# Patient Record
Sex: MALE | Race: WHITE | Employment: FULL TIME | URBAN - METROPOLITAN AREA
[De-identification: names, ages, dates, MRNs, and addresses within clinical notes are randomized per-mention and may not be internally consistent; named-entity substitution may affect disease eponyms.]

---

## 2017-01-10 ENCOUNTER — OFFICE VISIT (OUTPATIENT)
Dept: INTERNAL MEDICINE CLINIC | Age: 37
End: 2017-01-10

## 2017-01-10 VITALS
WEIGHT: 299 LBS | SYSTOLIC BLOOD PRESSURE: 144 MMHG | BODY MASS INDEX: 42.8 KG/M2 | HEIGHT: 70 IN | DIASTOLIC BLOOD PRESSURE: 98 MMHG

## 2017-01-10 DIAGNOSIS — E66.01 MORBID OBESITY WITH BMI OF 40.0-44.9, ADULT (HCC): Primary | ICD-10-CM

## 2017-01-10 DIAGNOSIS — I10 ESSENTIAL HYPERTENSION: ICD-10-CM

## 2017-01-10 PROCEDURE — 99213 OFFICE O/P EST LOW 20 MIN: CPT | Performed by: NURSE PRACTITIONER

## 2017-01-10 ASSESSMENT — ENCOUNTER SYMPTOMS
EYES NEGATIVE: 1
COLOR CHANGE: 0
COUGH: 0
SHORTNESS OF BREATH: 0
BLOOD IN STOOL: 0
DIARRHEA: 0
BACK PAIN: 0
CONSTIPATION: 0

## 2017-02-07 ENCOUNTER — OFFICE VISIT (OUTPATIENT)
Dept: ORTHOPEDIC SURGERY | Age: 37
End: 2017-02-07

## 2017-02-07 VITALS — HEIGHT: 70 IN | WEIGHT: 299 LBS | BODY MASS INDEX: 42.8 KG/M2

## 2017-02-07 DIAGNOSIS — M75.82 TENDINITIS OF LEFT ROTATOR CUFF: Primary | ICD-10-CM

## 2017-02-07 PROCEDURE — 99213 OFFICE O/P EST LOW 20 MIN: CPT | Performed by: ORTHOPAEDIC SURGERY

## 2017-02-15 ENCOUNTER — PATIENT MESSAGE (OUTPATIENT)
Dept: INTERNAL MEDICINE CLINIC | Age: 37
End: 2017-02-15

## 2017-02-15 DIAGNOSIS — H92.09 EAR DISCOMFORT, UNSPECIFIED LATERALITY: Primary | ICD-10-CM

## 2017-02-15 RX ORDER — AZITHROMYCIN 250 MG/1
TABLET, FILM COATED ORAL
Qty: 1 PACKET | Refills: 0 | Status: SHIPPED | OUTPATIENT
Start: 2017-02-15 | End: 2017-02-25

## 2017-02-21 DIAGNOSIS — M25.512 LEFT SHOULDER PAIN, UNSPECIFIED CHRONICITY: Primary | ICD-10-CM

## 2017-02-21 PROCEDURE — L3670 SO ACRO/CLAV CAN WEB PRE OTS: HCPCS | Performed by: ORTHOPAEDIC SURGERY

## 2017-03-15 ENCOUNTER — TELEPHONE (OUTPATIENT)
Dept: ORTHOPEDIC SURGERY | Age: 37
End: 2017-03-15

## 2017-03-15 ENCOUNTER — OFFICE VISIT (OUTPATIENT)
Dept: INTERNAL MEDICINE CLINIC | Age: 37
End: 2017-03-15

## 2017-03-15 VITALS
BODY MASS INDEX: 42.66 KG/M2 | SYSTOLIC BLOOD PRESSURE: 134 MMHG | WEIGHT: 298 LBS | OXYGEN SATURATION: 98 % | TEMPERATURE: 98.2 F | HEIGHT: 70 IN | HEART RATE: 80 BPM | DIASTOLIC BLOOD PRESSURE: 88 MMHG

## 2017-03-15 DIAGNOSIS — K21.9 GASTROESOPHAGEAL REFLUX DISEASE WITHOUT ESOPHAGITIS: ICD-10-CM

## 2017-03-15 DIAGNOSIS — M25.512 ACUTE PAIN OF LEFT SHOULDER: Primary | ICD-10-CM

## 2017-03-15 DIAGNOSIS — K22.2 SCHATZKI'S RING: ICD-10-CM

## 2017-03-15 DIAGNOSIS — I10 ESSENTIAL HYPERTENSION: ICD-10-CM

## 2017-03-15 DIAGNOSIS — E66.01 MORBID OBESITY WITH BMI OF 40.0-44.9, ADULT (HCC): ICD-10-CM

## 2017-03-15 DIAGNOSIS — Z01.818 PRE-OP EXAM: ICD-10-CM

## 2017-03-15 DIAGNOSIS — E78.5 HYPERLIPIDEMIA, UNSPECIFIED HYPERLIPIDEMIA TYPE: ICD-10-CM

## 2017-03-15 PROCEDURE — 99214 OFFICE O/P EST MOD 30 MIN: CPT | Performed by: NURSE PRACTITIONER

## 2017-03-15 PROCEDURE — 93000 ELECTROCARDIOGRAM COMPLETE: CPT | Performed by: NURSE PRACTITIONER

## 2017-03-15 RX ORDER — LOSARTAN POTASSIUM 25 MG/1
25 TABLET ORAL DAILY
Qty: 30 TABLET | Refills: 3 | Status: SHIPPED | OUTPATIENT
Start: 2017-03-15 | End: 2018-01-23 | Stop reason: SDUPTHER

## 2017-03-22 ENCOUNTER — TELEPHONE (OUTPATIENT)
Dept: ORTHOPEDIC SURGERY | Age: 37
End: 2017-03-22

## 2017-03-27 ENCOUNTER — HOSPITAL ENCOUNTER (OUTPATIENT)
Dept: OTHER | Age: 37
Discharge: HOME | End: 2017-03-27
Attending: NURSE PRACTITIONER | Admitting: NURSE PRACTITIONER

## 2017-03-27 LAB
A/G RATIO: 1.7 (ref 1.1–2.2)
ALBUMIN SERPL-MCNC: 4.5 G/DL (ref 3.4–5)
ALP BLD-CCNC: 88 U/L (ref 40–129)
ALT SERPL-CCNC: 41 U/L (ref 10–40)
ANION GAP SERPL CALCULATED.3IONS-SCNC: 11 MMOL/L (ref 3–16)
AST SERPL-CCNC: 26 U/L (ref 15–37)
BASOPHILS ABSOLUTE: 0 K/UL (ref 0–0.2)
BASOPHILS RELATIVE PERCENT: 0.8 %
BILIRUB SERPL-MCNC: 0.5 MG/DL (ref 0–1)
BUN BLDV-MCNC: 14 MG/DL (ref 7–20)
CALCIUM SERPL-MCNC: 8.4 MG/DL (ref 8.3–10.6)
CHLORIDE BLD-SCNC: 102 MMOL/L (ref 99–110)
CO2: 27 MMOL/L (ref 21–32)
CREAT SERPL-MCNC: 1 MG/DL (ref 0.9–1.3)
EOSINOPHILS ABSOLUTE: 0.3 K/UL (ref 0–0.6)
EOSINOPHILS RELATIVE PERCENT: 4.2 %
GFR AFRICAN AMERICAN: >60
GFR NON-AFRICAN AMERICAN: >60
GLOBULIN: 2.7 G/DL
GLUCOSE BLD-MCNC: 100 MG/DL (ref 70–99)
HCT VFR BLD CALC: 44.6 % (ref 40.5–52.5)
HEMOGLOBIN: 14.5 G/DL (ref 13.5–17.5)
LYMPHOCYTES ABSOLUTE: 1.8 K/UL (ref 1–5.1)
LYMPHOCYTES RELATIVE PERCENT: 30.2 %
MCH RBC QN AUTO: 26.5 PG (ref 26–34)
MCHC RBC AUTO-ENTMCNC: 32.5 G/DL (ref 31–36)
MCV RBC AUTO: 81.6 FL (ref 80–100)
MONOCYTES ABSOLUTE: 0.5 K/UL (ref 0–1.3)
MONOCYTES RELATIVE PERCENT: 8.2 %
NEUTROPHILS ABSOLUTE: 3.4 K/UL (ref 1.7–7.7)
NEUTROPHILS RELATIVE PERCENT: 56.6 %
PDW BLD-RTO: 15.1 % (ref 12.4–15.4)
PLATELET # BLD: 175 K/UL (ref 135–450)
PMV BLD AUTO: 10.1 FL (ref 5–10.5)
POTASSIUM SERPL-SCNC: 4 MMOL/L (ref 3.5–5.1)
RBC # BLD: 5.46 M/UL (ref 4.2–5.9)
SODIUM BLD-SCNC: 140 MMOL/L (ref 136–145)
TOTAL PROTEIN: 7.2 G/DL (ref 6.4–8.2)
WBC # BLD: 6 K/UL (ref 4–11)

## 2017-03-28 RX ORDER — ONDANSETRON 2 MG/ML
4 INJECTION INTRAMUSCULAR; INTRAVENOUS
Status: ACTIVE | OUTPATIENT
Start: 2017-03-28 | End: 2017-03-28

## 2017-03-28 RX ORDER — DIPHENHYDRAMINE HYDROCHLORIDE 50 MG/ML
12.5 INJECTION INTRAMUSCULAR; INTRAVENOUS
Status: ACTIVE | OUTPATIENT
Start: 2017-03-28 | End: 2017-03-28

## 2017-03-28 RX ORDER — PROMETHAZINE HYDROCHLORIDE 25 MG/ML
6.25 INJECTION, SOLUTION INTRAMUSCULAR; INTRAVENOUS
Status: ACTIVE | OUTPATIENT
Start: 2017-03-28 | End: 2017-03-28

## 2017-03-28 RX ORDER — LABETALOL HYDROCHLORIDE 5 MG/ML
5 INJECTION, SOLUTION INTRAVENOUS EVERY 10 MIN PRN
Status: DISCONTINUED | OUTPATIENT
Start: 2017-03-28 | End: 2017-03-30 | Stop reason: HOSPADM

## 2017-03-28 RX ORDER — MORPHINE SULFATE 2 MG/ML
2 INJECTION, SOLUTION INTRAMUSCULAR; INTRAVENOUS EVERY 5 MIN PRN
Status: DISCONTINUED | OUTPATIENT
Start: 2017-03-28 | End: 2017-03-30 | Stop reason: HOSPADM

## 2017-03-28 RX ORDER — MEPERIDINE HYDROCHLORIDE 25 MG/ML
12.5 INJECTION INTRAMUSCULAR; INTRAVENOUS; SUBCUTANEOUS EVERY 5 MIN PRN
Status: DISCONTINUED | OUTPATIENT
Start: 2017-03-28 | End: 2017-03-30 | Stop reason: HOSPADM

## 2017-03-28 RX ORDER — HYDRALAZINE HYDROCHLORIDE 20 MG/ML
5 INJECTION INTRAMUSCULAR; INTRAVENOUS EVERY 10 MIN PRN
Status: DISCONTINUED | OUTPATIENT
Start: 2017-03-28 | End: 2017-03-30 | Stop reason: HOSPADM

## 2017-03-28 RX ORDER — OXYCODONE HYDROCHLORIDE AND ACETAMINOPHEN 5; 325 MG/1; MG/1
1 TABLET ORAL PRN
Status: ACTIVE | OUTPATIENT
Start: 2017-03-28 | End: 2017-03-28

## 2017-03-28 RX ORDER — OXYCODONE HYDROCHLORIDE AND ACETAMINOPHEN 5; 325 MG/1; MG/1
2 TABLET ORAL PRN
Status: ACTIVE | OUTPATIENT
Start: 2017-03-28 | End: 2017-03-28

## 2017-03-28 RX ORDER — MORPHINE SULFATE 2 MG/ML
1 INJECTION, SOLUTION INTRAMUSCULAR; INTRAVENOUS EVERY 5 MIN PRN
Status: DISCONTINUED | OUTPATIENT
Start: 2017-03-28 | End: 2017-03-30 | Stop reason: HOSPADM

## 2017-03-29 ENCOUNTER — HOSPITAL ENCOUNTER (OUTPATIENT)
Dept: SURGERY | Age: 37
Discharge: HOME | End: 2017-03-29
Attending: ORTHOPAEDIC SURGERY | Admitting: ORTHOPAEDIC SURGERY

## 2017-03-29 VITALS
OXYGEN SATURATION: 100 % | SYSTOLIC BLOOD PRESSURE: 127 MMHG | HEART RATE: 87 BPM | DIASTOLIC BLOOD PRESSURE: 80 MMHG | BODY MASS INDEX: 42.66 KG/M2 | TEMPERATURE: 97 F | WEIGHT: 298 LBS | HEIGHT: 70 IN | RESPIRATION RATE: 16 BRPM

## 2017-03-29 RX ORDER — OXYCODONE HYDROCHLORIDE AND ACETAMINOPHEN 5; 325 MG/1; MG/1
1 TABLET ORAL EVERY 4 HOURS PRN
Qty: 60 TABLET | Refills: 0 | Status: SHIPPED | OUTPATIENT
Start: 2017-03-29 | End: 2017-04-05

## 2017-03-29 RX ORDER — ACETAMINOPHEN 10 MG/ML
1000 INJECTION, SOLUTION INTRAVENOUS ONCE
Status: COMPLETED | OUTPATIENT
Start: 2017-03-29 | End: 2017-03-29

## 2017-03-29 RX ORDER — OXYCODONE HYDROCHLORIDE AND ACETAMINOPHEN 5; 325 MG/1; MG/1
1 TABLET ORAL EVERY 4 HOURS PRN
Status: DISCONTINUED | OUTPATIENT
Start: 2017-03-29 | End: 2017-03-30 | Stop reason: HOSPADM

## 2017-03-29 RX ORDER — OXYCODONE HYDROCHLORIDE AND ACETAMINOPHEN 5; 325 MG/1; MG/1
TABLET ORAL
Status: DISPENSED
Start: 2017-03-29 | End: 2017-03-29

## 2017-03-29 RX ORDER — FENTANYL CITRATE 50 UG/ML
INJECTION, SOLUTION INTRAMUSCULAR; INTRAVENOUS
Status: DISPENSED
Start: 2017-03-29 | End: 2017-03-29

## 2017-03-29 RX ORDER — MIDAZOLAM HYDROCHLORIDE 1 MG/ML
INJECTION INTRAMUSCULAR; INTRAVENOUS
Status: DISPENSED
Start: 2017-03-29 | End: 2017-03-29

## 2017-03-29 RX ORDER — LIDOCAINE HYDROCHLORIDE 10 MG/ML
1 INJECTION, SOLUTION EPIDURAL; INFILTRATION; INTRACAUDAL; PERINEURAL
Status: ACTIVE | OUTPATIENT
Start: 2017-03-29 | End: 2017-03-29

## 2017-03-29 RX ORDER — ONDANSETRON 4 MG/1
4 TABLET, ORALLY DISINTEGRATING ORAL EVERY 8 HOURS PRN
Qty: 40 TABLET | Refills: 0 | Status: SHIPPED | OUTPATIENT
Start: 2017-03-29 | End: 2018-02-14 | Stop reason: ALTCHOICE

## 2017-03-29 RX ORDER — SODIUM CHLORIDE, SODIUM LACTATE, POTASSIUM CHLORIDE, CALCIUM CHLORIDE 600; 310; 30; 20 MG/100ML; MG/100ML; MG/100ML; MG/100ML
INJECTION, SOLUTION INTRAVENOUS CONTINUOUS
Status: DISCONTINUED | OUTPATIENT
Start: 2017-03-29 | End: 2017-03-30 | Stop reason: HOSPADM

## 2017-03-29 RX ADMIN — SODIUM CHLORIDE, SODIUM LACTATE, POTASSIUM CHLORIDE, CALCIUM CHLORIDE: 600; 310; 30; 20 INJECTION, SOLUTION INTRAVENOUS at 07:22

## 2017-03-29 RX ADMIN — OXYCODONE HYDROCHLORIDE AND ACETAMINOPHEN 1 TABLET: 5; 325 TABLET ORAL at 10:58

## 2017-03-29 RX ADMIN — ACETAMINOPHEN 1000 MG: 10 INJECTION, SOLUTION INTRAVENOUS at 07:38

## 2017-03-29 ASSESSMENT — PAIN SCALES - GENERAL: PAINLEVEL_OUTOF10: 7

## 2017-03-29 ASSESSMENT — PAIN - FUNCTIONAL ASSESSMENT: PAIN_FUNCTIONAL_ASSESSMENT: 0-10

## 2017-03-30 DIAGNOSIS — M75.82 TENDINITIS OF LEFT ROTATOR CUFF: Primary | ICD-10-CM

## 2017-04-04 ENCOUNTER — OFFICE VISIT (OUTPATIENT)
Dept: ORTHOPEDIC SURGERY | Age: 37
End: 2017-04-04

## 2017-04-04 VITALS — HEIGHT: 70 IN | BODY MASS INDEX: 42.67 KG/M2 | WEIGHT: 298.06 LBS

## 2017-04-04 DIAGNOSIS — Z98.890 S/P ROTATOR CUFF REPAIR: ICD-10-CM

## 2017-04-04 DIAGNOSIS — Z98.890 S/P ARTHROSCOPY OF SHOULDER: ICD-10-CM

## 2017-04-04 PROCEDURE — 99024 POSTOP FOLLOW-UP VISIT: CPT | Performed by: PHYSICIAN ASSISTANT

## 2017-04-05 ENCOUNTER — OFFICE VISIT (OUTPATIENT)
Dept: ORTHOPEDIC SURGERY | Age: 37
End: 2017-04-05

## 2017-04-05 ENCOUNTER — HOSPITAL ENCOUNTER (OUTPATIENT)
Dept: PHYSICAL THERAPY | Age: 37
Discharge: HOME | End: 2017-04-05
Admitting: ORTHOPAEDIC SURGERY

## 2017-04-05 ENCOUNTER — HOSPITAL ENCOUNTER (OUTPATIENT)
Dept: PHYSICAL THERAPY | Age: 37
Discharge: HOME | End: 2017-04-30
Admitting: ORTHOPAEDIC SURGERY

## 2017-04-05 VITALS — HEIGHT: 70 IN | BODY MASS INDEX: 42.67 KG/M2 | WEIGHT: 298.06 LBS

## 2017-04-05 DIAGNOSIS — M76.31 ILIOTIBIAL BAND SYNDROME, RIGHT: ICD-10-CM

## 2017-04-05 DIAGNOSIS — M25.561 RIGHT KNEE PAIN, UNSPECIFIED CHRONICITY: Primary | ICD-10-CM

## 2017-04-05 DIAGNOSIS — M79.671 PAIN OF RIGHT HEEL: ICD-10-CM

## 2017-04-05 PROCEDURE — 99214 OFFICE O/P EST MOD 30 MIN: CPT | Performed by: ORTHOPAEDIC SURGERY

## 2017-04-05 PROCEDURE — 73560 X-RAY EXAM OF KNEE 1 OR 2: CPT | Performed by: ORTHOPAEDIC SURGERY

## 2017-04-05 PROCEDURE — 73650 X-RAY EXAM OF HEEL: CPT | Performed by: ORTHOPAEDIC SURGERY

## 2017-04-12 ENCOUNTER — HOSPITAL ENCOUNTER (OUTPATIENT)
Dept: PHYSICAL THERAPY | Age: 37
Discharge: HOME | End: 2017-04-12
Admitting: ORTHOPAEDIC SURGERY

## 2017-04-19 ENCOUNTER — HOSPITAL ENCOUNTER (OUTPATIENT)
Dept: PHYSICAL THERAPY | Age: 37
Discharge: HOME | End: 2017-04-19
Admitting: ORTHOPAEDIC SURGERY

## 2017-04-20 ENCOUNTER — HOSPITAL ENCOUNTER (OUTPATIENT)
Dept: PHYSICAL THERAPY | Age: 37
Discharge: HOME | End: 2017-04-20
Admitting: ORTHOPAEDIC SURGERY

## 2017-04-24 ENCOUNTER — HOSPITAL ENCOUNTER (OUTPATIENT)
Dept: PHYSICAL THERAPY | Age: 37
Discharge: HOME | End: 2017-04-24
Admitting: ORTHOPAEDIC SURGERY

## 2017-04-26 ENCOUNTER — HOSPITAL ENCOUNTER (OUTPATIENT)
Dept: PHYSICAL THERAPY | Age: 37
Discharge: HOME | End: 2017-04-26
Admitting: ORTHOPAEDIC SURGERY

## 2017-04-27 ENCOUNTER — HOSPITAL ENCOUNTER (OUTPATIENT)
Dept: PHYSICAL THERAPY | Age: 37
Discharge: HOME | End: 2017-04-27
Admitting: ORTHOPAEDIC SURGERY

## 2017-05-02 ENCOUNTER — OFFICE VISIT (OUTPATIENT)
Dept: ORTHOPEDIC SURGERY | Age: 37
End: 2017-05-02

## 2017-05-02 VITALS — BODY MASS INDEX: 42.67 KG/M2 | HEIGHT: 70 IN | WEIGHT: 298.06 LBS

## 2017-05-02 DIAGNOSIS — M76.821 TIBIALIS POSTICUS TENDINITIS, RIGHT: ICD-10-CM

## 2017-05-02 DIAGNOSIS — M79.671 FOOT PAIN, RIGHT: Primary | ICD-10-CM

## 2017-05-02 PROCEDURE — 73630 X-RAY EXAM OF FOOT: CPT | Performed by: PHYSICIAN ASSISTANT

## 2017-05-02 PROCEDURE — L1902 AFO ANKLE GAUNTLET PRE OTS: HCPCS | Performed by: PHYSICIAN ASSISTANT

## 2017-05-02 PROCEDURE — 99213 OFFICE O/P EST LOW 20 MIN: CPT | Performed by: PHYSICIAN ASSISTANT

## 2017-05-03 ENCOUNTER — HOSPITAL ENCOUNTER (OUTPATIENT)
Dept: PHYSICAL THERAPY | Age: 37
Discharge: HOME | End: 2017-05-03
Admitting: ORTHOPAEDIC SURGERY

## 2017-05-09 ENCOUNTER — OFFICE VISIT (OUTPATIENT)
Dept: ORTHOPEDIC SURGERY | Age: 37
End: 2017-05-09

## 2017-05-09 VITALS — WEIGHT: 298.06 LBS | HEIGHT: 70 IN | BODY MASS INDEX: 42.67 KG/M2

## 2017-05-09 DIAGNOSIS — Z98.890 S/P ARTHROSCOPY OF SHOULDER: Primary | ICD-10-CM

## 2017-05-09 PROCEDURE — 99024 POSTOP FOLLOW-UP VISIT: CPT | Performed by: ORTHOPAEDIC SURGERY

## 2017-06-22 ENCOUNTER — OFFICE VISIT (OUTPATIENT)
Dept: INTERNAL MEDICINE CLINIC | Age: 37
End: 2017-06-22

## 2017-06-22 VITALS
HEIGHT: 70 IN | WEIGHT: 292 LBS | SYSTOLIC BLOOD PRESSURE: 132 MMHG | DIASTOLIC BLOOD PRESSURE: 80 MMHG | BODY MASS INDEX: 41.8 KG/M2

## 2017-06-22 DIAGNOSIS — M25.571 ACUTE RIGHT ANKLE PAIN: ICD-10-CM

## 2017-06-22 DIAGNOSIS — I10 ESSENTIAL HYPERTENSION: Primary | ICD-10-CM

## 2017-06-22 DIAGNOSIS — E66.01 MORBID OBESITY DUE TO EXCESS CALORIES (HCC): ICD-10-CM

## 2017-06-22 PROCEDURE — 99213 OFFICE O/P EST LOW 20 MIN: CPT | Performed by: INTERNAL MEDICINE

## 2017-06-22 ASSESSMENT — ENCOUNTER SYMPTOMS
ABDOMINAL DISTENTION: 0
VOMITING: 0
SHORTNESS OF BREATH: 0
CONSTIPATION: 0
BACK PAIN: 0
DIARRHEA: 0
COUGH: 0
WHEEZING: 0
NAUSEA: 0
CHEST TIGHTNESS: 0

## 2017-06-22 ASSESSMENT — PATIENT HEALTH QUESTIONNAIRE - PHQ9
SUM OF ALL RESPONSES TO PHQ QUESTIONS 1-9: 0
2. FEELING DOWN, DEPRESSED OR HOPELESS: 0
SUM OF ALL RESPONSES TO PHQ9 QUESTIONS 1 & 2: 0
1. LITTLE INTEREST OR PLEASURE IN DOING THINGS: 0

## 2017-06-23 ENCOUNTER — OFFICE VISIT (OUTPATIENT)
Dept: ORTHOPEDIC SURGERY | Age: 37
End: 2017-06-23

## 2017-06-23 VITALS
SYSTOLIC BLOOD PRESSURE: 136 MMHG | DIASTOLIC BLOOD PRESSURE: 94 MMHG | BODY MASS INDEX: 41.82 KG/M2 | HEIGHT: 70 IN | HEART RATE: 82 BPM | WEIGHT: 292.11 LBS

## 2017-06-23 DIAGNOSIS — M79.671 RIGHT FOOT PAIN: Primary | ICD-10-CM

## 2017-06-23 PROCEDURE — 99243 OFF/OP CNSLTJ NEW/EST LOW 30: CPT | Performed by: ORTHOPAEDIC SURGERY

## 2017-06-27 ENCOUNTER — OFFICE VISIT (OUTPATIENT)
Dept: ORTHOPEDIC SURGERY | Age: 37
End: 2017-06-27

## 2017-06-27 VITALS — BODY MASS INDEX: 41.82 KG/M2 | HEIGHT: 70 IN | WEIGHT: 292.11 LBS

## 2017-06-27 DIAGNOSIS — Z98.890 S/P ARTHROSCOPY OF SHOULDER: Primary | ICD-10-CM

## 2017-06-27 PROCEDURE — 99024 POSTOP FOLLOW-UP VISIT: CPT | Performed by: ORTHOPAEDIC SURGERY

## 2017-07-06 DIAGNOSIS — M79.671 RIGHT FOOT PAIN: Primary | ICD-10-CM

## 2017-07-20 ENCOUNTER — TELEPHONE (OUTPATIENT)
Dept: ORTHOPEDIC SURGERY | Age: 37
End: 2017-07-20

## 2017-07-21 ENCOUNTER — ORTHOTIC/BRACE ENCOUNTER (OUTPATIENT)
Dept: ORTHOPEDIC SURGERY | Age: 37
End: 2017-07-21

## 2017-07-24 ENCOUNTER — HOSPITAL ENCOUNTER (OUTPATIENT)
Dept: PHYSICAL THERAPY | Age: 37
Discharge: HOME | End: 2017-07-31
Admitting: ORTHOPAEDIC SURGERY

## 2017-07-28 ENCOUNTER — ORTHOTIC/BRACE ENCOUNTER (OUTPATIENT)
Dept: ORTHOPEDIC SURGERY | Age: 37
End: 2017-07-28

## 2017-07-28 DIAGNOSIS — M72.2 PLANTAR FASCIITIS: Primary | ICD-10-CM

## 2017-07-28 PROCEDURE — L3020 FOOT LONGITUD/METATARSAL SUP: HCPCS | Performed by: PEDORTHIST

## 2017-08-07 ENCOUNTER — HOSPITAL ENCOUNTER (OUTPATIENT)
Dept: PHYSICAL THERAPY | Age: 37
Discharge: HOME | End: 2017-08-07
Admitting: ORTHOPAEDIC SURGERY

## 2017-08-14 ENCOUNTER — HOSPITAL ENCOUNTER (OUTPATIENT)
Dept: PHYSICAL THERAPY | Age: 37
Discharge: HOME | End: 2017-08-14
Admitting: ORTHOPAEDIC SURGERY

## 2017-08-23 ENCOUNTER — HOSPITAL ENCOUNTER (OUTPATIENT)
Dept: PHYSICAL THERAPY | Age: 37
Discharge: HOME | End: 2017-08-23
Admitting: ORTHOPAEDIC SURGERY

## 2017-08-29 ENCOUNTER — HOSPITAL ENCOUNTER (OUTPATIENT)
Dept: PHYSICAL THERAPY | Age: 37
Discharge: HOME | End: 2017-08-29
Admitting: ORTHOPAEDIC SURGERY

## 2017-09-26 ENCOUNTER — OFFICE VISIT (OUTPATIENT)
Dept: ORTHOPEDIC SURGERY | Age: 37
End: 2017-09-26

## 2017-09-26 VITALS
DIASTOLIC BLOOD PRESSURE: 67 MMHG | SYSTOLIC BLOOD PRESSURE: 90 MMHG | HEIGHT: 70 IN | HEART RATE: 76 BPM | WEIGHT: 292.11 LBS | BODY MASS INDEX: 41.82 KG/M2

## 2017-09-26 DIAGNOSIS — M76.821 POSTERIOR TIBIAL TENDINITIS OF RIGHT LEG: ICD-10-CM

## 2017-09-26 DIAGNOSIS — M79.671 RIGHT FOOT PAIN: Primary | ICD-10-CM

## 2017-09-26 PROCEDURE — 99212 OFFICE O/P EST SF 10 MIN: CPT | Performed by: ORTHOPAEDIC SURGERY

## 2017-09-26 RX ORDER — MELOXICAM 15 MG/1
15 TABLET ORAL DAILY
Qty: 30 TABLET | Refills: 3 | Status: SHIPPED | OUTPATIENT
Start: 2017-09-26 | End: 2018-11-01 | Stop reason: ALTCHOICE

## 2017-11-10 ENCOUNTER — TELEPHONE (OUTPATIENT)
Dept: INTERNAL MEDICINE CLINIC | Age: 37
End: 2017-11-10

## 2017-11-10 NOTE — TELEPHONE ENCOUNTER
Patient need an order for a TB test for work. 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         Chelsie Sanches: [phone]

## 2017-11-15 ENCOUNTER — NURSE ONLY (OUTPATIENT)
Dept: INTERNAL MEDICINE CLINIC | Age: 37
End: 2017-11-15

## 2017-11-15 DIAGNOSIS — Z11.1 PPD SCREENING TEST: Primary | ICD-10-CM

## 2017-11-15 PROCEDURE — 86580 TB INTRADERMAL TEST: CPT | Performed by: INTERNAL MEDICINE

## 2017-11-17 ENCOUNTER — NURSE ONLY (OUTPATIENT)
Dept: INTERNAL MEDICINE CLINIC | Age: 37
End: 2017-11-17

## 2017-12-14 ENCOUNTER — OFFICE VISIT (OUTPATIENT)
Dept: ORTHOPEDIC SURGERY | Age: 37
End: 2017-12-14

## 2017-12-14 VITALS
DIASTOLIC BLOOD PRESSURE: 99 MMHG | SYSTOLIC BLOOD PRESSURE: 162 MMHG | BODY MASS INDEX: 42.29 KG/M2 | WEIGHT: 295.4 LBS | HEART RATE: 82 BPM | HEIGHT: 70 IN

## 2017-12-14 DIAGNOSIS — M25.562 LEFT KNEE PAIN, UNSPECIFIED CHRONICITY: Primary | ICD-10-CM

## 2017-12-14 PROCEDURE — 99213 OFFICE O/P EST LOW 20 MIN: CPT | Performed by: ORTHOPAEDIC SURGERY

## 2017-12-14 NOTE — PROGRESS NOTES
CHIEF COMPLAINT:    Chief Complaint   Patient presents with    Knee Pain     LEFT KNEE PAIN- 1 MONTH, PAIN W/ KNEELING/BENDING/STAIRS. NO CAR        HISTORY OF PRESENT ILLNESS:                The patient is a 44 y.o. male   Past Medical History:   Diagnosis Date    GERD (gastroesophageal reflux disease)     Hypertension       This patient presents with ongoing pain along the lateral aspect of his LEFT patellar region. He didn't recall any specific mechanism injury. He has difficulty with squatting and kneeling Intensive activities. He notes some clicking occasionally. 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     The pain assessment was noted & is as follows:  Pain Assessment  Location of Pain: Knee  Location Modifiers: Left  Severity of Pain: 1 (7/10 AT WORST)  Quality of Pain: Aching, Sharp, Grinding, Popping  Duration of Pain: Persistent  Frequency of Pain: Constant  Aggravating Factors: Kneeling, Stairs, Walking, Bending  Limiting Behavior: Yes  Relieving Factors: Rest]      Work Status/Functionality:     Past Medical History: Medical history form was reviewed today & can be found in the media tab  Past Medical History:   Diagnosis Date    GERD (gastroesophageal reflux disease)     Hypertension       Past Surgical History:     Past Surgical History:   Procedure Laterality Date    CHOLECYSTECTOMY      EYE SURGERY Left 1/2014    [facility]    EYE SURGERY Right 3/2014    [facility]    UPPER GASTROINTESTINAL ENDOSCOPY  7/9/2012    WISDOM TOOTH EXTRACTION       Current Medications:     Current Outpatient Prescriptions:     meloxicam (MOBIC) 15 MG tablet, Take 1 tablet by mouth daily, Disp: 30 tablet, Rfl: 3    ondansetron (ZOFRAN ODT) 4 MG disintegrating tablet, Take 1 tablet by mouth every 8 hours as needed for Nausea or Vomiting, Disp: 40 tablet, Rfl: 0    losartan (COZAAR) 25 MG tablet, Take 1 tablet by mouth daily, Disp: 30 tablet, Rfl: 3    cetirizine (ZYRTEC) 10 MG tablet, Take 10 mg by mouth daily , Disp: , Rfl:     lansoprazole (PREVACID) 15 MG capsule, Take 15 mg by mouth daily , Disp: , Rfl:   Allergies:  Penicillins and Versed [midazolam]  Social History:    reports that he has never smoked. He has never used smokeless tobacco. He reports that he does not drink alcohol or use drugs. Family History:   Family History   Problem Relation Age of Onset    High Blood Pressure Mother     High Cholesterol Mother     High Blood Pressure Father     High Cholesterol Father     Diabetes Father     High Blood Pressure Brother     High Blood Pressure Maternal Grandmother     Diabetes Maternal Grandmother     High Blood Pressure Maternal Grandfather     Heart Disease Maternal Grandfather     High Blood Pressure Paternal Grandfather     Heart Disease Paternal Grandfather        REVIEW OF SYSTEMS:   For new problems, a full review of systems will be found scanned in the patient's chart. CONSTITUTIONAL: Denies unexplained weight loss, fevers, chills   NEUROLOGICAL: Denies unsteady gait or progressive weakness  SKIN: Denies skin changes, delayed healing, rash, itching       PHYSICAL EXAM:    Vitals: Blood pressure (!) 162/99, pulse 82, height 5' 9.69\" (1.77 m), weight 295 lb 6.4 oz (134 kg). GENERAL EXAM:  · General Apparence: Patient is adequately groomed with no evidence of malnutrition. · Orientation: The patient is oriented to time, place and person. · Mood & Affect:The patient's mood and affect are appropriate       LEFT knee PHYSICAL EXAMINATION:  · Inspection:  TRACKING OF THE PATELLA AND LATERAL TILT OF THE PATELLA. NO VISIBLE EFFUSION ECCHYMOSIS OR DEFORMITY OTHERWISE.    · Palpation:  Tender along the lateral parapatellar region with range of motion. · Range of Motion: 0135 degrees. · Strength: No focal motor weakness. Good VMO tone. · Special Tests:  Negative Verito testing medially and laterally. ACL MCL PCL LCL are all intact.   He doesn't 1+ subluxation of the patella and mild crepitus to range of motion of the patellofemoral joint laterally. · Skin:  There are no rashes, ulcerations or lesions. · Gait & station: Normal      · Additional Examinations:        Negative straight leg raise examination. Diagnostic Testing:      3 x-ray views of the LEFT knee demonstrates no significant bony pathology or abnormality. Orders     Orders Placed This Encounter   Procedures    XR KNEE LEFT (3 VIEWS)     [id number]     Order Specific Question:   Reason for exam:     Answer:   Pain         Assessment / Treatment Plan:     1. Lateral tracking of the patella. The patient's going to continue utilizes J brace and do stretches. He's been instructed to no exercise program.  He does have some evidence of possible lateral meniscus pathology. If he does continue to symptoms to need an MRI scan. 2.  Follow-up p.r.n.    3. I have personally performed and/or participated in the history, exam and medical decision making and agree with all pertinent clinical information. I have also reviewed and agree with the past medical, family and social history unless otherwise noted. This dictation was performed with a verbal recognition program (DRAGON) and it was checked for errors. It is possible that there are still dictated errors within this office note. If so, please bring any errors to my attention for an addendum. All efforts were made to ensure that this office note is accurate.           Dana Garrett MD

## 2018-01-23 ENCOUNTER — TELEPHONE (OUTPATIENT)
Dept: INTERNAL MEDICINE CLINIC | Age: 38
End: 2018-01-23

## 2018-01-23 DIAGNOSIS — I10 ESSENTIAL HYPERTENSION: ICD-10-CM

## 2018-01-23 RX ORDER — LOSARTAN POTASSIUM 25 MG/1
25 TABLET ORAL DAILY
Qty: 30 TABLET | Refills: 0 | Status: SHIPPED | OUTPATIENT
Start: 2018-01-23 | End: 2018-02-14 | Stop reason: SDUPTHER

## 2018-01-23 NOTE — TELEPHONE ENCOUNTER
Patient made an appointment for 2/14/18 with Dr. Jenn Wiggins but is in need of a refill of his Losartan 25 mg to be sent to [facility].

## 2018-02-14 ENCOUNTER — OFFICE VISIT (OUTPATIENT)
Dept: INTERNAL MEDICINE CLINIC | Age: 38
End: 2018-02-14

## 2018-02-14 VITALS
WEIGHT: 294 LBS | DIASTOLIC BLOOD PRESSURE: 78 MMHG | OXYGEN SATURATION: 96 % | HEART RATE: 121 BPM | SYSTOLIC BLOOD PRESSURE: 126 MMHG | BODY MASS INDEX: 42.57 KG/M2

## 2018-02-14 DIAGNOSIS — I10 ESSENTIAL HYPERTENSION: ICD-10-CM

## 2018-02-14 DIAGNOSIS — E78.2 MIXED HYPERLIPIDEMIA: ICD-10-CM

## 2018-02-14 DIAGNOSIS — M25.571 CHRONIC PAIN OF RIGHT ANKLE: ICD-10-CM

## 2018-02-14 DIAGNOSIS — K21.9 GASTROESOPHAGEAL REFLUX DISEASE WITHOUT ESOPHAGITIS: Primary | ICD-10-CM

## 2018-02-14 DIAGNOSIS — G89.29 CHRONIC PAIN OF RIGHT ANKLE: ICD-10-CM

## 2018-02-14 PROCEDURE — 99214 OFFICE O/P EST MOD 30 MIN: CPT | Performed by: INTERNAL MEDICINE

## 2018-02-14 RX ORDER — LOSARTAN POTASSIUM 25 MG/1
25 TABLET ORAL DAILY
Qty: 30 TABLET | Refills: 5 | Status: SHIPPED | OUTPATIENT
Start: 2018-02-14 | End: 2019-01-15 | Stop reason: SDUPTHER

## 2018-02-14 ASSESSMENT — ENCOUNTER SYMPTOMS
SHORTNESS OF BREATH: 0
COUGH: 0
ABDOMINAL DISTENTION: 0
DIARRHEA: 0
VOMITING: 0
BACK PAIN: 0
WHEEZING: 0
CHEST TIGHTNESS: 0
CONSTIPATION: 0
NAUSEA: 0

## 2018-05-16 ENCOUNTER — OFFICE VISIT (OUTPATIENT)
Dept: INTERNAL MEDICINE CLINIC | Age: 38
End: 2018-05-16

## 2018-05-16 VITALS
SYSTOLIC BLOOD PRESSURE: 110 MMHG | WEIGHT: 282 LBS | BODY MASS INDEX: 39.48 KG/M2 | DIASTOLIC BLOOD PRESSURE: 76 MMHG | HEIGHT: 71 IN | TEMPERATURE: 98.2 F

## 2018-05-16 DIAGNOSIS — J30.2 CHRONIC SEASONAL ALLERGIC RHINITIS, UNSPECIFIED TRIGGER: ICD-10-CM

## 2018-05-16 DIAGNOSIS — E66.01 MORBID OBESITY WITH BMI OF 40.0-44.9, ADULT (HCC): ICD-10-CM

## 2018-05-16 DIAGNOSIS — J32.9 SINUSITIS, UNSPECIFIED CHRONICITY, UNSPECIFIED LOCATION: Primary | ICD-10-CM

## 2018-05-16 PROCEDURE — 99214 OFFICE O/P EST MOD 30 MIN: CPT | Performed by: NURSE PRACTITIONER

## 2018-05-16 RX ORDER — SULFAMETHOXAZOLE AND TRIMETHOPRIM 800; 160 MG/1; MG/1
1 TABLET ORAL 2 TIMES DAILY
Qty: 20 TABLET | Refills: 0 | Status: SHIPPED | OUTPATIENT
Start: 2018-05-16 | End: 2018-05-26

## 2018-05-16 ASSESSMENT — ENCOUNTER SYMPTOMS
COLOR CHANGE: 0
BACK PAIN: 0
COUGH: 0
CONSTIPATION: 0
DIARRHEA: 0
EYES NEGATIVE: 1
BLOOD IN STOOL: 0
SINUS PAIN: 1
SHORTNESS OF BREATH: 0
SINUS PRESSURE: 1
SORE THROAT: 1

## 2018-09-11 ENCOUNTER — OFFICE VISIT (OUTPATIENT)
Dept: INTERNAL MEDICINE CLINIC | Age: 38
End: 2018-09-11

## 2018-09-11 VITALS
HEIGHT: 71 IN | DIASTOLIC BLOOD PRESSURE: 86 MMHG | SYSTOLIC BLOOD PRESSURE: 130 MMHG | WEIGHT: 280 LBS | TEMPERATURE: 98.5 F | BODY MASS INDEX: 39.2 KG/M2 | HEART RATE: 86 BPM | OXYGEN SATURATION: 98 %

## 2018-09-11 DIAGNOSIS — I10 ESSENTIAL HYPERTENSION: ICD-10-CM

## 2018-09-11 DIAGNOSIS — H92.03 ACUTE EAR PAIN, BILATERAL: ICD-10-CM

## 2018-09-11 DIAGNOSIS — J01.90 ACUTE SINUSITIS, RECURRENCE NOT SPECIFIED, UNSPECIFIED LOCATION: Primary | ICD-10-CM

## 2018-09-11 DIAGNOSIS — J02.9 PHARYNGITIS, UNSPECIFIED ETIOLOGY: ICD-10-CM

## 2018-09-11 PROCEDURE — 99214 OFFICE O/P EST MOD 30 MIN: CPT | Performed by: NURSE PRACTITIONER

## 2018-09-11 RX ORDER — BENZONATATE 100 MG/1
100 CAPSULE ORAL EVERY 6 HOURS PRN
Qty: 30 CAPSULE | Refills: 0 | Status: SHIPPED | OUTPATIENT
Start: 2018-09-11 | End: 2018-09-18

## 2018-09-11 RX ORDER — SULFAMETHOXAZOLE AND TRIMETHOPRIM 800; 160 MG/1; MG/1
1 TABLET ORAL 2 TIMES DAILY
Qty: 20 TABLET | Refills: 0 | Status: SHIPPED | OUTPATIENT
Start: 2018-09-11 | End: 2018-09-21

## 2018-09-11 ASSESSMENT — ENCOUNTER SYMPTOMS
SINUS PRESSURE: 1
COLOR CHANGE: 0
CONSTIPATION: 0
EYES NEGATIVE: 1
DIARRHEA: 0
WHEEZING: 0
BACK PAIN: 0
BLOOD IN STOOL: 0
SORE THROAT: 1
COUGH: 1
SHORTNESS OF BREATH: 0

## 2018-09-11 ASSESSMENT — PATIENT HEALTH QUESTIONNAIRE - PHQ9
SUM OF ALL RESPONSES TO PHQ QUESTIONS 1-9: 0
SUM OF ALL RESPONSES TO PHQ9 QUESTIONS 1 & 2: 0
SUM OF ALL RESPONSES TO PHQ QUESTIONS 1-9: 0
1. LITTLE INTEREST OR PLEASURE IN DOING THINGS: 0
2. FEELING DOWN, DEPRESSED OR HOPELESS: 0

## 2018-09-11 NOTE — PROGRESS NOTES
09/11/18    [facility]  43 y.o.  male    Chief Complaint   Patient presents with    Chest Congestion       Cough,Possible Sinus Infection         HPI:   Pt presents with complaint of fever today, with head congestion which started yesterday. Blowing out green mucus, throat is sore, both ears hurt. Cough is non productive, lungs burn. Has taken sudafed and mucinex, tylenol and aleve. /86 (Site: Right Upper Arm, Position: Sitting, Cuff Size: Large Adult)   Pulse 86   Temp 98.5 °F (36.9 °C) (Oral) Comment: With Medication  Ht 5' 10.5\" (1.791 m)   Wt 280 lb (127 kg)   SpO2 98%   BMI 39.61 kg/m²     Current Outpatient Prescriptions   Medication Sig Dispense Refill    losartan (COZAAR) 25 MG tablet Take 1 tablet by mouth daily 30 tablet 5    meloxicam (MOBIC) 15 MG tablet Take 1 tablet by mouth daily 30 tablet 3    cetirizine (ZYRTEC) 10 MG tablet Take 10 mg by mouth daily       lansoprazole (PREVACID) 15 MG capsule Take 15 mg by mouth daily        No current facility-administered medications for this visit. Social History     Social History    Marital status:      Spouse name: N/A    Number of children: N/A    Years of education: N/A     Occupational History    Not on file.      Social History Main Topics    Smoking status: Never Smoker    Smokeless tobacco: Never Used    Alcohol use No    Drug use: No    Sexual activity: Yes     Partners: Female     Other Topics Concern    Not on file     Social History Narrative    No narrative on file       Family History   Problem Relation Age of Onset    High Blood Pressure Mother     High Cholesterol Mother     High Blood Pressure Father     High Cholesterol Father     Diabetes Father     High Blood Pressure Brother     High Blood Pressure Maternal Grandmother     Diabetes Maternal Grandmother     High Blood Pressure Maternal Grandfather     Heart Disease Maternal Grandfather     High Blood Pressure Paternal Grandfather     Heart Disease Paternal Grandfather        Past Medical History:   Diagnosis Date    GERD (gastroesophageal reflux disease)     Hypertension        Review of Systems   Constitutional: Negative for activity change, appetite change and fatigue. HENT: Positive for congestion, ear pain, sinus pressure and sore throat. Eyes: Negative. Respiratory: Positive for cough. Negative for shortness of breath and wheezing. Cardiovascular: Negative for chest pain and palpitations. Gastrointestinal: Negative for blood in stool, constipation and diarrhea. Genitourinary: Negative for difficulty urinating and dysuria. Musculoskeletal: Negative for arthralgias and back pain. Skin: Negative for color change and rash. Neurological: Negative for dizziness and weakness. Psychiatric/Behavioral: Negative for agitation and behavioral problems. Physical Exam   Constitutional: He is oriented to person, place, and time. He appears well-developed and well-nourished. No distress. HENT:   Head: Normocephalic and atraumatic. Right Ear: External ear normal.   Left Ear: External ear normal.   Mouth/Throat: Oropharynx is clear and moist. No oropharyngeal exudate. Eyes: Conjunctivae are normal. Right eye exhibits no discharge. Left eye exhibits no discharge. No scleral icterus. Neck: Normal range of motion. Cardiovascular: Normal rate, regular rhythm, normal heart sounds and intact distal pulses. No murmur heard. Pulmonary/Chest: Effort normal and breath sounds normal. He has no wheezes. Abdominal: Soft. Bowel sounds are normal. He exhibits no distension and no mass. There is no tenderness. Musculoskeletal: Normal range of motion. He exhibits no edema or tenderness. Lymphadenopathy:     He has no cervical adenopathy. Neurological: He is alert and oriented to person, place, and time. Skin: Skin is warm and dry. No rash noted. He is not diaphoretic. No erythema.    Psychiatric: He has a normal mood and affect. His behavior is normal.     1. Acute sinusitis, recurrence not specified, unspecified location  Bactrim 800-160 mg bid x 10 days. Take with food. #20 NRF  Tessalon perles 100 mg every 6 hours PRN #30 NRF    2. Acute ear pain, bilateral  See above plan    3. Pharyngitis, unspecified etiology  See above plan    4.  Essential hypertension  Continue current meds     Ethel Frank, APRN - CNP

## 2018-10-17 ENCOUNTER — OFFICE VISIT (OUTPATIENT)
Dept: ORTHOPEDIC SURGERY | Age: 38
End: 2018-10-17
Payer: COMMERCIAL

## 2018-10-17 VITALS
HEIGHT: 71 IN | WEIGHT: 279.98 LBS | DIASTOLIC BLOOD PRESSURE: 94 MMHG | SYSTOLIC BLOOD PRESSURE: 146 MMHG | HEART RATE: 79 BPM | BODY MASS INDEX: 39.2 KG/M2

## 2018-10-17 DIAGNOSIS — M25.511 RIGHT SHOULDER PAIN, UNSPECIFIED CHRONICITY: Primary | ICD-10-CM

## 2018-10-17 PROCEDURE — 99213 OFFICE O/P EST LOW 20 MIN: CPT | Performed by: PHYSICIAN ASSISTANT

## 2018-10-17 NOTE — PROGRESS NOTES
Subjective    Patient ID: Nicole Santillan is a 43 y.o..  male. Shoulder Pain: Patient complaints of right shoulder pain. This is evaluated as a personal injury. The pain is described as aching and dull ache. The onset of the pain was sudden, related to swinging an ax. Mechanism of injury: swinging an axe. The pain occurs when active. Location is lateral. No history of dislocation. Symptoms are aggravated by reaching, lifting, repetitive use, work at or above shoulder height. Symptoms are diminished by  rest.   Limited activities include: reaching, lifting. mild stiffness, mild weakness, no swelling, no crepitus noted is reported. Patient is a light manual worker . Patient's medications, allergies, past medical, surgical, social and family histories were reviewed and updated as appropriate. The pain assessment was noted & is as follows:  Pain Assessment  Location of Pain: Shoulder  Location Modifiers: Right  Severity of Pain: 4  Quality of Pain: Dull, Aching  Duration of Pain: Persistent  Frequency of Pain: Constant  Aggravating Factors: Other (Comment)  Limiting Behavior: Some  Relieving Factors: Rest  Result of Injury: No  Work-Related Injury: No  Are there other pain locations you wish to document?: No    Physical Exam  Constitutional:  Pt well groomed, no acute distress, well developed, no obvious deformities  Vitals:    10/17/18 1732 10/17/18 1736   BP: (!) 158/102 (!) 146/94   Pulse: 81 79   Weight: 279 lb 15.8 oz (127 kg)    Height: 5' 10.51\" (1.791 m)      -Oriented to person, place, and time  -mood and affect are appropriate    Shoulder Exam:  Examination of the right shoulder shows:  -There is not a deformity.    -There is not erythema.    -There is  soft tissue swelling.    -Deltoid region is  tender to palpation. AC Joint is not tender to palpation. Clavicle is not tender to palpation. Bicipital Groove is not tender to palpation. Pectoralis  is not tender to palpation.

## 2018-11-01 ENCOUNTER — OFFICE VISIT (OUTPATIENT)
Dept: ORTHOPEDIC SURGERY | Age: 38
End: 2018-11-01
Payer: COMMERCIAL

## 2018-11-01 VITALS — BODY MASS INDEX: 39.2 KG/M2 | HEIGHT: 71 IN | WEIGHT: 279.98 LBS

## 2018-11-01 DIAGNOSIS — M25.511 RIGHT SHOULDER PAIN, UNSPECIFIED CHRONICITY: Primary | ICD-10-CM

## 2018-11-01 PROCEDURE — G8417 CALC BMI ABV UP PARAM F/U: HCPCS | Performed by: ORTHOPAEDIC SURGERY

## 2018-11-01 PROCEDURE — 1036F TOBACCO NON-USER: CPT | Performed by: ORTHOPAEDIC SURGERY

## 2018-11-01 PROCEDURE — 99203 OFFICE O/P NEW LOW 30 MIN: CPT | Performed by: ORTHOPAEDIC SURGERY

## 2018-11-01 PROCEDURE — G8484 FLU IMMUNIZE NO ADMIN: HCPCS | Performed by: ORTHOPAEDIC SURGERY

## 2018-11-01 PROCEDURE — G8427 DOCREV CUR MEDS BY ELIG CLIN: HCPCS | Performed by: ORTHOPAEDIC SURGERY

## 2018-11-01 NOTE — PROGRESS NOTES
Chief Complaint  New Patient (RIght Shoulder: pain came sudden w/ swinging an AX, was seen in [facility] 10/17/18 Deltoid and Trapezius strain, was told to take OTC meds and and gentle stretch)      History of Present Illness:  Carol Ann Holloway is a 43 y.o. y/o male who presents today for evaluation of his right shoulder. On 10/12/18 he was swimming in accident log when he missed with the axis hand in the axial hit the log. He had a lot of vibration and immediately had pain all throughout his upper extremity. Initially he had some numbness and tingling in his arm, however that improved. He went after hours clinic on 10/17/80 when he was evaluated. He has been taking naproxen and has taken some leftover diclofenac as well as acetaminophen. His continued having pain that is in the lateral aspect of his shoulder. He does have some pain that radiates up into his trapezius at times, but feels like the pain has become more localized to her shoulder. He denies shoulder instability. He denies any major prior shoulder injuries or surgery. He is unable to lift weights at this time which she was previously doing. He has minimal work. He denies numbness and tingling today. Occupation/activities: CRNA    Medical History  Past Medical History:   Diagnosis Date    GERD (gastroesophageal reflux disease)     Hypertension        Past Surgical History:   Procedure Laterality Date    CHOLECYSTECTOMY      EYE SURGERY Left 1/2014    [facility]    [facility]GERY [facility] 3/2014    [facility] [place]    UPPER GASTROINTESTINAL ENDOSCOPY  7/9/2012    WISDOM TOOTH EXTRACTION         Social History     Social History    Marital status:      Spouse name: N/A    Number of children: N/A    Years of education: N/A     Occupational History    Not on file.      Social History Main Topics    Smoking status: Never Smoker    Smokeless tobacco: Never Used    Alcohol use No    Drug use: No    Sexual activity: Yes     Partners: Female     Other Topics Concern experience any GI upset. They were instructed to call for vomiting, abdominal pain or black/bloody stools. Renal function testing should be provided per primary care provider periodically. The patient expresses understanding of these issues and questions were answered.  -he may continue with ice and activity modification    -If there are issues interim he'll contact the office    Dez Tate MD  [id number] partner of [facility] ([facility])      Voice Recognition Dictation disclaimer: Please note that portions of this chart were generated using Dragon dictation software. Although every effort was made to ensure the accuracy of this automated transcription, some errors in transcription may have occurred.

## 2018-11-08 ENCOUNTER — HOSPITAL ENCOUNTER (OUTPATIENT)
Dept: PHYSICAL THERAPY | Age: 38
Setting detail: THERAPIES SERIES
Discharge: HOME | End: 2018-11-08
Payer: COMMERCIAL

## 2018-11-08 PROCEDURE — 97110 THERAPEUTIC EXERCISES: CPT

## 2018-11-08 PROCEDURE — G8984 CARRY CURRENT STATUS: HCPCS

## 2018-11-08 PROCEDURE — G8985 CARRY GOAL STATUS: HCPCS

## 2018-11-08 PROCEDURE — 97161 PT EVAL LOW COMPLEX 20 MIN: CPT

## 2018-11-08 NOTE — PLAN OF CARE
G-Codes:  PT G-Codes  Functional Assessment Tool Used: QDASH  Score: 52% disability  Functional Limitation: Carrying, moving and handling objects  Carrying, Moving and Handling Objects Current Status (): At least 40 percent but less than 60 percent impaired, limited or restricted  Carrying, Moving and Handling Objects Goal Status (): At least 1 percent but less than 20 percent impaired, limited or restricted    ASSESSMENT:   Functional Impairments   []Noted spinal or UE joint hypomobility   []Noted spinal or UE joint hypermobility   [x]Decreased UE functional ROM   [x]Decreased UE functional strength   []Abnormal reflexes/sensation/myotomal/dermatomal deficits   [x]Decreased RC/scapular/core strength and neuromuscular control   []other:      Functional Activity Limitations (from functional questionnaire and intake)   []Reduced ability to tolerate prolonged functional positions   []Reduced ability or difficulty with changes of positions or transfers between positions   []Reduced ability to maintain good posture and demonstrate good body mechanics with sitting, bending, and lifting   [] Reduced ability or tolerance with driving and/or computer work   []Reduced ability to sleep   [x]Reduced ability to perform lifting, reaching, carrying tasks   [x]Reduced ability to tolerate impact through UE   [x]Reduced ability to reach behind back   [x]Reduced ability to  or hold objects   [x]Reduced ability to throw or toss an object   []other:    Participation Restrictions   []Reduced participation in self care activities   []Reduced participation in home management activities   []Reduced participation in work activities   []Reduced participation in social activities. [x]Reduced participation in sport/recreation activities. Classification:   []Signs/symptoms consistent with post-surgical status including decreased ROM, strength and function.   [x]Signs/symptoms consistent with joint

## 2018-11-15 ENCOUNTER — APPOINTMENT (OUTPATIENT)
Dept: PHYSICAL THERAPY | Age: 38
End: 2018-11-15
Payer: COMMERCIAL

## 2018-11-16 ENCOUNTER — HOSPITAL ENCOUNTER (OUTPATIENT)
Dept: PHYSICAL THERAPY | Age: 38
Setting detail: THERAPIES SERIES
Discharge: HOME | End: 2018-11-16
Payer: COMMERCIAL

## 2018-11-16 PROCEDURE — 97140 MANUAL THERAPY 1/> REGIONS: CPT

## 2018-11-16 PROCEDURE — 97110 THERAPEUTIC EXERCISES: CPT

## 2018-11-21 ENCOUNTER — HOSPITAL ENCOUNTER (OUTPATIENT)
Dept: PHYSICAL THERAPY | Age: 38
Setting detail: THERAPIES SERIES
Discharge: HOME | End: 2018-11-21
Payer: COMMERCIAL

## 2018-11-21 PROCEDURE — 97110 THERAPEUTIC EXERCISES: CPT

## 2018-11-21 PROCEDURE — 97140 MANUAL THERAPY 1/> REGIONS: CPT

## 2018-11-21 NOTE — FLOWSHEET NOTE
house/yardwork, driving/computer work      Manual Treatments:  PROM / STM / Oscillations-Mobs:  G-I, II, III, IV (PA's, Inf., Post.)  [x] (12705) Provided manual therapy to mobilize soft tissue/joints of cervical/CT, scapular GHJ and UE for the purpose of modulating pain, promoting relaxation,  increasing ROM, reducing/eliminating soft tissue swelling/inflammation/restriction, improving soft tissue extensibility and allowing for proper ROM for normal function with self care, reaching, carrying, lifting, house/yardwork, driving/computer work    Modalities: CP at home     Charges:  Timed Code Treatment Minutes: 55   Total Treatment Minutes: 55     [] EVAL  [x] UL(16622) x  3   [] IONTO  [] NMR (63992) x      [] VASO  [x] Manual (41841) x  1    [] Other:  [] TA x       [] Mech Traction (10635)  [] ES(attended) (97046)      [] ES (un) (38727):     GOALS:  Patient stated goal: Get back to lifting weights, taekwondo    Therapist goals for Patient:   Short Term Goals: To be achieved in: 2 weeks  1. Independent in HEP and progression per patient tolerance, in order to prevent re-injury. 2. Patient will have a decrease in pain to facilitate improvement in movement, function, and ADLs as indicated by Functional Deficits. Long Term Goals: To be achieved in: 8 weeks  1. Disability index score of 2% or less for the DASH to assist with reaching prior level of function. 2. Patient will demonstrate increased AROM to WNL to allow for proper joint functioning as indicated by patients Functional Deficits. 3. Patient will demonstrate an increase in Strength to 5/5 to allow for proper functional mobility as indicated by patients Functional Deficits. 4. Patient will return to reaching overhead 3x with 5# without increased symptoms or restriction. 5. Patient will return to functional IR without increased symptoms or restriction.         Progression Towards Functional goals:  [] Patient is progressing as expected towards

## 2018-12-06 ENCOUNTER — HOSPITAL ENCOUNTER (OUTPATIENT)
Dept: PHYSICAL THERAPY | Age: 38
Setting detail: THERAPIES SERIES
Discharge: HOME | End: 2018-12-06
Payer: COMMERCIAL

## 2018-12-06 DIAGNOSIS — M25.511 RIGHT SHOULDER PAIN, UNSPECIFIED CHRONICITY: Primary | ICD-10-CM

## 2018-12-06 PROCEDURE — 97110 THERAPEUTIC EXERCISES: CPT

## 2018-12-06 PROCEDURE — 97140 MANUAL THERAPY 1/> REGIONS: CPT

## 2018-12-06 PROCEDURE — G0283 ELEC STIM OTHER THAN WOUND: HCPCS

## 2018-12-06 PROCEDURE — 97032 APPL MODALITY 1+ESTIM EA 15: CPT

## 2018-12-13 ENCOUNTER — HOSPITAL ENCOUNTER (OUTPATIENT)
Dept: PHYSICAL THERAPY | Age: 38
Setting detail: THERAPIES SERIES
Discharge: HOME | End: 2018-12-13
Payer: COMMERCIAL

## 2018-12-13 PROCEDURE — 97140 MANUAL THERAPY 1/> REGIONS: CPT

## 2018-12-13 PROCEDURE — 97110 THERAPEUTIC EXERCISES: CPT

## 2018-12-13 PROCEDURE — G0283 ELEC STIM OTHER THAN WOUND: HCPCS

## 2018-12-19 ENCOUNTER — HOSPITAL ENCOUNTER (OUTPATIENT)
Dept: PHYSICAL THERAPY | Age: 38
Setting detail: THERAPIES SERIES
Discharge: HOME | End: 2018-12-19
Payer: COMMERCIAL

## 2018-12-19 PROCEDURE — 97140 MANUAL THERAPY 1/> REGIONS: CPT | Performed by: PHYSICAL THERAPIST

## 2018-12-19 PROCEDURE — G0283 ELEC STIM OTHER THAN WOUND: HCPCS | Performed by: PHYSICAL THERAPIST

## 2018-12-19 PROCEDURE — 97110 THERAPEUTIC EXERCISES: CPT | Performed by: PHYSICAL THERAPIST

## 2018-12-19 NOTE — FLOWSHEET NOTE
moving and handling objects  Carrying, Moving and Handling Objects Current Status (): At least 40 percent but less than 60 percent impaired, limited or restricted  Carrying, Moving and Handling Objects Goal Status (): At least 1 percent but less than 20 percent impaired, limited or restricted    Progress Note: [x]  Yes  []  No  Next due by: Visit #10      Latex Allergy:  [x]NO      []YES  Preferred Language for Healthcare:   [x]English       []other:    Visit # Insurance Allowable   6 30     Pain level:  3-4/10     SUBJECTIVE:  Reports ache has significantly decreased since last two needling sessions. Continues to have sharp pain with certain movements.     OBJECTIVE: See eval  Observation:   Test measurements:      RESTRICTIONS/PRECAUTIONS: HTN    Exercises/Interventions:   Therapeutic Ex Sets/sec Reps Notes HEP   UBE  Wall slides 5'  10\"   10     SLER 2# 2x15     SL ABD 3# 30     Prone extension  Prone row  Serratus punch supine 4#  4#  4# 3x10  10  30     TB extension  30 black    TB row  30 blue    TB ER/IR  30/30 (15 blue/ 15 black IR)    TB tricep extension  30 black    Ball rolls wall  Bicep curls  x30ea  4# x30     See other flow sheet                                                 Pt education 8'  HEP with progression, goals of PT, not to push through pain with ex, decrease use of upper trap with shoulder elevation, use of ice- pt stated understanding                  Manual Intervention       PROM, RS @ 90  STM anterior and lateral shoulder MM  STM biceps 10'                                                    NMR re-education                                                                   Therapeutic Exercise and NMR EXR  [x] (17578) Provided verbal/tactile cueing for activities related to strengthening, flexibility, endurance, ROM  for improvements in scapular, scapulothoracic and UE control with self care, reaching, carrying, lifting, house/yardwork, driving/computer work.    [] (42782) Provided verbal/tactile cueing for activities related to improving balance, coordination, kinesthetic sense, posture, motor skill, proprioception  to assist with  scapular, scapulothoracic and UE control with self care, reaching, carrying, lifting, house/yardwork, driving/computer work. Therapeutic Activities:    [] (06712 or 88098) Provided verbal/tactile cueing for activities related to improving balance, coordination, kinesthetic sense, posture, motor skill, proprioception and motor activation to allow for proper function of scapular, scapulothoracic and UE control with self care, carrying, lifting, driving/computer work.      Home Exercise Program:    [x] (98594) Reviewed/Progressed HEP activities related to strengthening, flexibility, endurance, ROM of scapular, scapulothoracic and UE control with self care, reaching, carrying, lifting, house/yardwork, driving/computer work  [] (71048) Reviewed/Progressed HEP activities related to improving balance, coordination, kinesthetic sense, posture, motor skill, proprioception of scapular, scapulothoracic and UE control with self care, reaching, carrying, lifting, house/yardwork, driving/computer work      Manual Treatments:  PROM / STM / Oscillations-Mobs:  G-I, II, III, IV (PA's, Inf., Post.)  [x] (66411) Provided manual therapy to mobilize soft tissue/joints of cervical/CT, scapular GHJ and UE for the purpose of modulating pain, promoting relaxation,  increasing ROM, reducing/eliminating soft tissue swelling/inflammation/restriction, improving soft tissue extensibility and allowing for proper ROM for normal function with self care, reaching, carrying, lifting, house/yardwork, driving/computer work    Modalities: hivolt E-stim with [facility] 10'      Charges:  Timed Code Treatment Minutes: 45   Total Treatment Minutes: 55     [] EVAL  [x] CE(08458) x  2   [] IONTO  [] NMR (98993) x      [] VASO  [x] Manual (30394) x  1    [] Other:  [] TA x       [] Mech Traction

## 2019-01-15 ENCOUNTER — OFFICE VISIT (OUTPATIENT)
Dept: INTERNAL MEDICINE CLINIC | Age: 39
End: 2019-01-15
Payer: COMMERCIAL

## 2019-01-15 VITALS
BODY MASS INDEX: 40.16 KG/M2 | OXYGEN SATURATION: 98 % | WEIGHT: 284 LBS | HEART RATE: 103 BPM | DIASTOLIC BLOOD PRESSURE: 88 MMHG | SYSTOLIC BLOOD PRESSURE: 138 MMHG

## 2019-01-15 DIAGNOSIS — Z23 NEED FOR PROPHYLACTIC VACCINATION WITH COMBINED DIPHTHERIA-TETANUS-PERTUSSIS (DTP) VACCINE: ICD-10-CM

## 2019-01-15 DIAGNOSIS — I10 ESSENTIAL HYPERTENSION: Primary | ICD-10-CM

## 2019-01-15 DIAGNOSIS — E66.01 MORBID OBESITY WITH BMI OF 40.0-44.9, ADULT (HCC): ICD-10-CM

## 2019-01-15 PROCEDURE — 99213 OFFICE O/P EST LOW 20 MIN: CPT | Performed by: INTERNAL MEDICINE

## 2019-01-15 PROCEDURE — 1036F TOBACCO NON-USER: CPT | Performed by: INTERNAL MEDICINE

## 2019-01-15 PROCEDURE — G8417 CALC BMI ABV UP PARAM F/U: HCPCS | Performed by: INTERNAL MEDICINE

## 2019-01-15 PROCEDURE — G8484 FLU IMMUNIZE NO ADMIN: HCPCS | Performed by: INTERNAL MEDICINE

## 2019-01-15 PROCEDURE — G8427 DOCREV CUR MEDS BY ELIG CLIN: HCPCS | Performed by: INTERNAL MEDICINE

## 2019-01-15 RX ORDER — LOSARTAN POTASSIUM 25 MG/1
25 TABLET ORAL DAILY
Qty: 30 TABLET | Refills: 5 | Status: SHIPPED | OUTPATIENT
Start: 2019-01-15 | End: 2019-12-06 | Stop reason: SDUPTHER

## 2019-02-06 ENCOUNTER — APPOINTMENT (OUTPATIENT)
Dept: PHYSICAL THERAPY | Age: 39
End: 2019-02-06
Payer: COMMERCIAL

## 2019-02-12 ENCOUNTER — OFFICE VISIT (OUTPATIENT)
Dept: INTERNAL MEDICINE CLINIC | Age: 39
End: 2019-02-12
Payer: COMMERCIAL

## 2019-02-12 VITALS — DIASTOLIC BLOOD PRESSURE: 82 MMHG | SYSTOLIC BLOOD PRESSURE: 138 MMHG | OXYGEN SATURATION: 98 % | HEART RATE: 104 BPM

## 2019-02-12 DIAGNOSIS — I10 ESSENTIAL HYPERTENSION: ICD-10-CM

## 2019-02-12 DIAGNOSIS — Z01.818 PREOP EXAMINATION: Primary | ICD-10-CM

## 2019-02-12 DIAGNOSIS — M75.111 INCOMPLETE TEAR OF RIGHT ROTATOR CUFF: ICD-10-CM

## 2019-02-12 DIAGNOSIS — I10 HYPERTENSION, UNSPECIFIED TYPE: ICD-10-CM

## 2019-02-12 PROCEDURE — 1036F TOBACCO NON-USER: CPT | Performed by: NURSE PRACTITIONER

## 2019-02-12 PROCEDURE — G8427 DOCREV CUR MEDS BY ELIG CLIN: HCPCS | Performed by: NURSE PRACTITIONER

## 2019-02-12 PROCEDURE — 93000 ELECTROCARDIOGRAM COMPLETE: CPT | Performed by: NURSE PRACTITIONER

## 2019-02-12 PROCEDURE — 99214 OFFICE O/P EST MOD 30 MIN: CPT | Performed by: NURSE PRACTITIONER

## 2019-02-12 PROCEDURE — G8417 CALC BMI ABV UP PARAM F/U: HCPCS | Performed by: NURSE PRACTITIONER

## 2019-02-12 PROCEDURE — G8484 FLU IMMUNIZE NO ADMIN: HCPCS | Performed by: NURSE PRACTITIONER

## 2019-02-12 ASSESSMENT — PATIENT HEALTH QUESTIONNAIRE - PHQ9
2. FEELING DOWN, DEPRESSED OR HOPELESS: 0
1. LITTLE INTEREST OR PLEASURE IN DOING THINGS: 0
SUM OF ALL RESPONSES TO PHQ QUESTIONS 1-9: 0
SUM OF ALL RESPONSES TO PHQ9 QUESTIONS 1 & 2: 0
SUM OF ALL RESPONSES TO PHQ QUESTIONS 1-9: 0

## 2019-02-22 ENCOUNTER — HOSPITAL ENCOUNTER (OUTPATIENT)
Age: 39
Discharge: HOME | End: 2019-02-22
Payer: COMMERCIAL

## 2019-02-22 LAB
A/G RATIO: 1.6 (ref 1.1–2.2)
ALBUMIN SERPL-MCNC: 4.6 G/DL (ref 3.4–5)
ALP BLD-CCNC: 75 U/L (ref 40–129)
ALT SERPL-CCNC: 44 U/L (ref 10–40)
ANION GAP SERPL CALCULATED.3IONS-SCNC: 13 MMOL/L (ref 3–16)
AST SERPL-CCNC: 24 U/L (ref 15–37)
BILIRUB SERPL-MCNC: 0.4 MG/DL (ref 0–1)
BUN BLDV-MCNC: 16 MG/DL (ref 7–20)
CALCIUM SERPL-MCNC: 9.6 MG/DL (ref 8.3–10.6)
CHLORIDE BLD-SCNC: 103 MMOL/L (ref 99–110)
CHOLESTEROL, TOTAL: 209 MG/DL (ref 0–199)
CO2: 26 MMOL/L (ref 21–32)
CREAT SERPL-MCNC: 1 MG/DL (ref 0.9–1.3)
GFR AFRICAN AMERICAN: >60
GFR NON-AFRICAN AMERICAN: >60
GLOBULIN: 2.8 G/DL
GLUCOSE BLD-MCNC: 93 MG/DL (ref 70–99)
HDLC SERPL-MCNC: 41 MG/DL (ref 40–60)
LDL CHOLESTEROL CALCULATED: 122 MG/DL
POTASSIUM SERPL-SCNC: 4 MMOL/L (ref 3.5–5.1)
SODIUM BLD-SCNC: 142 MMOL/L (ref 136–145)
TOTAL PROTEIN: 7.4 G/DL (ref 6.4–8.2)
TRIGL SERPL-MCNC: 229 MG/DL (ref 0–150)
VLDLC SERPL CALC-MCNC: 46 MG/DL

## 2019-02-22 PROCEDURE — 36415 COLL VENOUS BLD VENIPUNCTURE: CPT

## 2019-02-22 PROCEDURE — 80061 LIPID PANEL: CPT

## 2019-02-22 PROCEDURE — 80053 COMPREHEN METABOLIC PANEL: CPT

## 2019-03-01 RX ORDER — OSELTAMIVIR PHOSPHATE 75 MG/1
75 CAPSULE ORAL 2 TIMES DAILY
Qty: 10 CAPSULE | Refills: 0 | Status: SHIPPED | OUTPATIENT
Start: 2019-03-01 | End: 2019-03-06

## 2019-03-04 ENCOUNTER — TELEPHONE (OUTPATIENT)
Dept: ORTHOPEDIC SURGERY | Age: 39
End: 2019-03-04

## 2019-03-06 ENCOUNTER — HOSPITAL ENCOUNTER (OUTPATIENT)
Dept: PHYSICAL THERAPY | Age: 39
Setting detail: THERAPIES SERIES
Discharge: HOME | End: 2019-03-06
Payer: COMMERCIAL

## 2019-03-06 PROCEDURE — 97161 PT EVAL LOW COMPLEX 20 MIN: CPT

## 2019-03-06 PROCEDURE — G8984 CARRY CURRENT STATUS: HCPCS

## 2019-03-06 PROCEDURE — G8985 CARRY GOAL STATUS: HCPCS

## 2019-03-06 PROCEDURE — 97110 THERAPEUTIC EXERCISES: CPT

## 2019-03-06 PROCEDURE — 97016 VASOPNEUMATIC DEVICE THERAPY: CPT

## 2019-03-14 ENCOUNTER — HOSPITAL ENCOUNTER (OUTPATIENT)
Dept: PHYSICAL THERAPY | Age: 39
Setting detail: THERAPIES SERIES
Discharge: HOME | End: 2019-03-14
Payer: COMMERCIAL

## 2019-03-14 PROCEDURE — 97140 MANUAL THERAPY 1/> REGIONS: CPT | Performed by: PHYSICAL THERAPIST

## 2019-03-14 PROCEDURE — 97016 VASOPNEUMATIC DEVICE THERAPY: CPT | Performed by: PHYSICAL THERAPIST

## 2019-03-14 PROCEDURE — 97110 THERAPEUTIC EXERCISES: CPT | Performed by: PHYSICAL THERAPIST

## 2019-03-18 ENCOUNTER — HOSPITAL ENCOUNTER (OUTPATIENT)
Dept: PHYSICAL THERAPY | Age: 39
Setting detail: THERAPIES SERIES
Discharge: HOME | End: 2019-03-18
Payer: COMMERCIAL

## 2019-03-18 PROCEDURE — 97110 THERAPEUTIC EXERCISES: CPT

## 2019-03-18 PROCEDURE — 97016 VASOPNEUMATIC DEVICE THERAPY: CPT

## 2019-03-18 PROCEDURE — 97140 MANUAL THERAPY 1/> REGIONS: CPT

## 2019-03-21 ENCOUNTER — HOSPITAL ENCOUNTER (OUTPATIENT)
Dept: PHYSICAL THERAPY | Age: 39
Setting detail: THERAPIES SERIES
Discharge: HOME | End: 2019-03-21
Payer: COMMERCIAL

## 2019-03-21 PROCEDURE — 97016 VASOPNEUMATIC DEVICE THERAPY: CPT

## 2019-03-21 PROCEDURE — 97110 THERAPEUTIC EXERCISES: CPT

## 2019-03-21 PROCEDURE — 97140 MANUAL THERAPY 1/> REGIONS: CPT

## 2019-03-25 ENCOUNTER — HOSPITAL ENCOUNTER (OUTPATIENT)
Dept: PHYSICAL THERAPY | Age: 39
Setting detail: THERAPIES SERIES
Discharge: HOME | End: 2019-03-25
Payer: COMMERCIAL

## 2019-03-25 PROCEDURE — 97016 VASOPNEUMATIC DEVICE THERAPY: CPT

## 2019-03-25 PROCEDURE — 97140 MANUAL THERAPY 1/> REGIONS: CPT

## 2019-03-25 PROCEDURE — 97110 THERAPEUTIC EXERCISES: CPT

## 2019-03-28 ENCOUNTER — HOSPITAL ENCOUNTER (OUTPATIENT)
Dept: PHYSICAL THERAPY | Age: 39
Setting detail: THERAPIES SERIES
Discharge: HOME | End: 2019-03-28
Payer: COMMERCIAL

## 2019-03-28 PROCEDURE — 97110 THERAPEUTIC EXERCISES: CPT

## 2019-03-28 PROCEDURE — 97016 VASOPNEUMATIC DEVICE THERAPY: CPT

## 2019-03-28 PROCEDURE — 97140 MANUAL THERAPY 1/> REGIONS: CPT

## 2019-04-03 ENCOUNTER — HOSPITAL ENCOUNTER (OUTPATIENT)
Dept: PHYSICAL THERAPY | Age: 39
Setting detail: THERAPIES SERIES
Discharge: HOME | End: 2019-04-03
Payer: COMMERCIAL

## 2019-04-03 PROCEDURE — 97016 VASOPNEUMATIC DEVICE THERAPY: CPT

## 2019-04-03 PROCEDURE — 97140 MANUAL THERAPY 1/> REGIONS: CPT

## 2019-04-03 PROCEDURE — 97110 THERAPEUTIC EXERCISES: CPT

## 2019-04-03 NOTE — FLOWSHEET NOTE
Physical Therapist Assistant Activity Sheet  Date:  4/3/2019    Patient Name:  Chela Plasencia    :  1980  MRN: [id number]  Restrictions/Precautions:    Medical/Treatment Diagnosis Information:  ·   Diagnosis: Incomplete tear of R RTC M75.111, Right shoulder pain M25.511  ·   Treatment Diagnosis: Right shoulder pain M25.511   · 19    Physician Information:    Referring Practitioner: Dr. Chepe Rosales Post-op  2wks    4 wks 6 wks 8 wks   3wks 6 wks 9 wks 12 wks                        Activities                                                          DOS/DOI:                                                         Date: 3/25/19 3/28/19 4/3/19         Plyoback      Chop                           Chest                           ER Flip            Long/Short lever  Flex. Scap.                                   ABd.             punch            Body/Cardio Blade Flex/Ext                                      IR/ER                                      ABd/ADd            Push-up      Plus                             Wall                           Table                          Floor            No Money/HAB-HAD/Stance            Ball on Wall   x30ea dir               Tramp            Theraband    Rows/Ext Green x 30 ea Green x 30 ea Blue x 20                         IR Red x 30 Red x 30  Green x30                         ER Red x 20 Red x 30 Red x 30                         No money                            Horiz. ABd                            Biceps                            Triceps Green x 30 Green x 30 Blue x30                         Punches            Cable Column   Rows                                 Ext. Lat. Pulldown                                 Biceps                                 Triceps            Modality  Vaso 10 minutes    PTA Assessment Introduced IR/ER today, pain and fatigue with ER.  Pt ed on painfree ex, not to push through pain Symptoms are much improved today; no pain with TB ex. Cont to emphasize painfree ex, verbalized understanding. Increased resistance with good tolerance. Added ball on wall with good understanding.     Time Based Treatment 15 minutes 15 minutes 20 minutes     Electronically signed by: Ashwin Escobar PT

## 2019-04-03 NOTE — FLOWSHEET NOTE
[address] [facility], [address]                Physical Therapy Daily Treatment Note  Date:  4/3/2019    Patient Name:  Mann Meredith :  1980  MRN: [id number]  Restrictions/Precautions:    Medical/Treatment Diagnosis Information:  · Diagnosis: Incomplete tear of R RTC M75.111, Right shoulder pain M25.511  · Treatment Diagnosis: Right shoulder pain K04.638   Insurance/Certification information:  PT Insurance Information: Med Mut  Physician Information:  Referring Practitioner: Dr. Galdino Hurd of care signed (Y/N):     Date of Patient follow up with Physician:  19    G-Code (if applicable):      Date G-Code Applied:      PT G-Codes  Functional Assessment Tool Used: QDASH   Score: 84% disability  Functional Limitation: Carrying, moving and handling objects  Carrying, Moving and Handling Objects Current Status (): At least 80 percent but less than 100 percent impaired, limited or restricted  Carrying, Moving and Handling Objects Goal Status (): At least 1 percent but less than 20 percent impaired, limited or restricted    Progress Note: [x]  Yes  []  No  Next due by: Visit #10      Latex Allergy:  [x]NO      []YES  Preferred Language for Healthcare:   [x]English       []other:    Visit # Insurance Allowable   6 30     Pain level:  2-3/10     SUBJECTIVE:  Pt will be 5 weeks post op . Worked first 25 hr shift Mon, stated wasn't able to get 2nd set of stretches in so felt really stiff yesterday so did some extra stretches. OBJECTIVE: See eval  Observation:   Test measurements:  Some swelling present R shoulder     PROM: R Shoulder Flex: 165 degrees ER:  80 at 90 ABD, 70 IR scap plane (4/3/19)    RESTRICTIONS/PRECAUTIONS:  s/p R RTC patch 19;      Exercises/Interventions:   Therapeutic Ex Sets/sec Reps Notes HEP   Elbow ROM      Seated passive ER  Shown with cane    Tables slides flex      Shrugs      Scap squeezes        Pulleys  Wall slides    5'  x10     Supine cane flex  Supine cane ER  SLER  SL ABD  Prone extension  Prone row  TB row/ext    See other flow sheet  x10  x10  1# 2x15  2x15  2# 2x15  1# x30  X   60-90 ABD          Red                                                                          Pt education 10'  Reviewed precautions, sling wear, HEP with progression, goals of PT, use of ice- pt stated understanding           Manual Intervention       Joint oscillations, PROM 10'                                                NMR re-education                                                                   Therapeutic Exercise and NMR EXR  [x] (07030) Provided verbal/tactile cueing for activities related to strengthening, flexibility, endurance, ROM  for improvements in scapular, scapulothoracic and UE control with self care, reaching, carrying, lifting, house/yardwork, driving/computer work.    [] (94985) Provided verbal/tactile cueing for activities related to improving balance, coordination, kinesthetic sense, posture, motor skill, proprioception  to assist with  scapular, scapulothoracic and UE control with self care, reaching, carrying, lifting, house/yardwork, driving/computer work. Therapeutic Activities:    [] (26164 or 66000) Provided verbal/tactile cueing for activities related to improving balance, coordination, kinesthetic sense, posture, motor skill, proprioception and motor activation to allow for proper function of scapular, scapulothoracic and UE control with self care, carrying, lifting, driving/computer work.      Home Exercise Program:    [x] (43910) Reviewed/Progressed HEP activities related to strengthening, flexibility, endurance, ROM of scapular, scapulothoracic and UE control with self care, reaching, carrying, lifting, house/yardwork, driving/computer work  [] (33289) Reviewed/Progressed HEP activities related to improving balance, coordination, kinesthetic sense, posture, motor skill, proprioception of scapular, scapulothoracic and UE control with self care, reaching, carrying, lifting, house/yardwork, driving/computer work      Manual Treatments:  PROM / STM / Oscillations-Mobs:  G-I, II, III, IV (PA's, Inf., Post.)  [x] (63557) Provided manual therapy to mobilize soft tissue/joints of cervical/CT, scapular GHJ and UE for the purpose of modulating pain, promoting relaxation,  increasing ROM, reducing/eliminating soft tissue swelling/inflammation/restriction, improving soft tissue extensibility and allowing for proper ROM for normal function with self care, reaching, carrying, lifting, house/yardwork, driving/computer work    Modalities:  VASO on medium for effusion 10'    Charges:  Timed Code Treatment Minutes: 45   Total Treatment Minutes: 55     [] EVAL  [x] OJ(74804) x  2   [] IONTO  [] NMR (07203) x      [x] VASO  [x] Manual (55661) x  1    [] Other:  [] TA x       [] Mech Traction (83073)  [] ES(attended) (39507)      [] ES (un) (22334):     GOALS:  Patient stated goal: Return to full duty at work. Therapist goals for Patient:   Short Term Goals: To be achieved in: 2 weeks  1. Independent in HEP and progression per patient tolerance, in order to prevent re-injury. 2. Patient will have a decrease in pain to facilitate improvement in movement, function, and ADLs as indicated by Functional Deficits. Long Term Goals: To be achieved in: 12 weeks  1. Disability index score of 19% or less for the DASH to assist with reaching prior level of function. 2. Patient will demonstrate increased AROM to 160 shoulder elevation to allow for proper joint functioning as indicated by patients Functional Deficits. 3. Patient will demonstrate an increase in Strength to 5/5 to allow for proper functional mobility as indicated by patients Functional Deficits. 4. Patient will return to functional activities without increased symptoms or restriction.    5. Patient will return to work activities without restriction as cleared by MD. (Patient specific)     Progression Towards Functional goals:  [x] Patient is progressing as expected towards functional goals listed. [] Progression is slowed due to complexities listed. [] Progression has been slowed due to co-morbidities. [] Plan just implemented, too soon to assess goals progression  [] Other:     ASSESSMENT:  ROM continuing to progress gradually. Slight increased reps, reiterate not to overdo activity or force through pain with ex. Blue TB to HEP. Improved tolerrance of TB ex. Pt agrees to continue 1x/wk after this week with understanding to continue with HEP. Sees MD prior to [place].     Treatment/Activity Tolerance:  [x] Patient tolerated treatment well [] Patient limited by fatique  [x] Patient limited by pain  [] Patient limited by other medical complications  [] Other:     Prognosis: [x] Good [] Fair  [] Poor    Patient Requires Follow-up: [x] Yes  [] No    PLAN: See eval  [x] Continue per plan of care [] Alter current plan (see comments)  [] Plan of care initiated [] Hold pending MD visit [] Discharge    Electronically signed by: Rachel Bennett PT, DPT

## 2019-04-10 ENCOUNTER — HOSPITAL ENCOUNTER (OUTPATIENT)
Dept: PHYSICAL THERAPY | Age: 39
Setting detail: THERAPIES SERIES
Discharge: HOME | End: 2019-04-10
Payer: COMMERCIAL

## 2019-04-10 PROCEDURE — 97140 MANUAL THERAPY 1/> REGIONS: CPT

## 2019-04-10 PROCEDURE — 97110 THERAPEUTIC EXERCISES: CPT

## 2019-04-10 PROCEDURE — 97016 VASOPNEUMATIC DEVICE THERAPY: CPT

## 2019-04-10 NOTE — FLOWSHEET NOTE
Physical Therapist Assistant Activity Sheet  Date:  4/10/2019    Patient Name:  Nikki Marsh    :  1980  MRN: [id number]  Restrictions/Precautions:    Medical/Treatment Diagnosis Information:  ·   Diagnosis: Incomplete tear of R RTC M75.111, Right shoulder pain M25.511  ·   Treatment Diagnosis: Right shoulder pain M25.511   · 19    Physician Information:    Referring Practitioner: Dr. Uriah Patino Post-op  2wks    4 wks 6 wks 8 wks   3wks 6 wks 9 wks 12 wks                        Activities                                                          DOS/DOI:                                                         Date: 3/25/19 3/28/19 4/3/19 4/10/19          Plyoback      Chop                            Chest                            ER Flip              Long/Short lever  Flex. x10                                Scap.                                    ABd.               punch              Body/Cardio Blade Flex/Ext                                       IR/ER                                       ABd/ADd              Push-up      Plus                              Wall                            Table                           Floor              No Money/HAB-HAD/Stance              Ball on Wall   x30ea dir x30ea dir               Tramp              Theraband    Rows/Ext Green x 30 ea Green x 30 ea Blue x 20 Blue x30                         IR Red x 30 Red x 30  Green x30                          ER Red x 20 Red x 30 Red x 30 Green x25                         No money                             Horiz. ABd                             Biceps    3# x30                         Triceps Green x 30 Green x 30 Blue x30 Blue x30                         Punches              Cable Column   Rows                                  Ext.                                   Lat. Pulldown                                  Biceps                                  Triceps              Modality  Vaso 10 minutes     PTA Assessment Introduced IR/ER today, pain and fatigue with ER. Pt ed on painfree ex, not to push through pain Symptoms are much improved today; no pain with TB ex. Cont to emphasize painfree ex, verbalized understanding. Increased resistance with good tolerance. Added ball on wall with good understanding. Gave black band for HEP progression. Cues to reduce shrug with functional lift. Educated to focus on form vs reps currently.     Time Based Treatment 15 minutes 15 minutes 20 minutes 20 minutes     Electronically signed by: Kandy Hernandez PT

## 2019-04-10 NOTE — FLOWSHEET NOTE
[address] [place], [address]                Physical Therapy Daily Treatment Note  Date:  4/10/2019    Patient Name:  Raissa Coley :  1980  MRN: [id number]  Restrictions/Precautions:    Medical/Treatment Diagnosis Information:  · Diagnosis: Incomplete tear of R RTC M75.111, Right shoulder pain M25.511  · Treatment Diagnosis: Right shoulder pain L88.157   Insurance/Certification information:  PT Insurance Information: Med Mut  Physician Information:  Referring Practitioner: Dr. Gm Kang of care signed (Y/N):     Date of Patient follow up with Physician:  2019    G-Code (if applicable):      Date G-Code Applied:      PT G-Codes  Functional Assessment Tool Used: QDASH   Score: 84% disability  Functional Limitation: Carrying, moving and handling objects  Carrying, Moving and Handling Objects Current Status (): At least 80 percent but less than 100 percent impaired, limited or restricted  Carrying, Moving and Handling Objects Goal Status (): At least 1 percent but less than 20 percent impaired, limited or restricted    Progress Note: [x]  Yes  []  No  Next due by: Visit #10      Latex Allergy:  [x]NO      []YES  Preferred Language for Healthcare:   [x]English       []other:    Visit # Insurance Allowable   7 30     Pain level:  -3/10     SUBJECTIVE:  Pt will be 6 weeks post op . Saw MD, per pt to hold on gym workouts for 3 more months. OBJECTIVE: See eval  Observation:   Test measurements:  Some swelling present R shoulder     PROM: R Shoulder Flex: 165 degrees ER:  80 at 90 ABD, 70 IR scap plane (4/3/19)    RESTRICTIONS/PRECAUTIONS:  s/p R RTC patch 19;      Exercises/Interventions:   Therapeutic Ex Sets/sec Reps Notes HEP   Elbow ROM      Seated passive ER  Shown with cane    Tables slides flex      Shrugs      Scap squeezes        Pulleys  Wall slides    5'  x10 Supine cane flex  Supine cane ER  SLER  SL ABD  Prone extension  Prone row  TB row/ext    See other flow sheet  x10  x10  1# x30  1# 2x15  3# x30  3# x30  X   60-90 ABD                                                                                    Pt education 10'  Reviewed precautions, sling wear, HEP with progression, goals of PT, use of ice- pt stated understanding           Manual Intervention       Joint oscillations, PROM  RS @ 90 8'  4'                                                NMR re-education                                                                   Therapeutic Exercise and NMR EXR  [x] (61669) Provided verbal/tactile cueing for activities related to strengthening, flexibility, endurance, ROM  for improvements in scapular, scapulothoracic and UE control with self care, reaching, carrying, lifting, house/yardwork, driving/computer work.    [] (08617) Provided verbal/tactile cueing for activities related to improving balance, coordination, kinesthetic sense, posture, motor skill, proprioception  to assist with  scapular, scapulothoracic and UE control with self care, reaching, carrying, lifting, house/yardwork, driving/computer work. Therapeutic Activities:    [] (10400 or 12435) Provided verbal/tactile cueing for activities related to improving balance, coordination, kinesthetic sense, posture, motor skill, proprioception and motor activation to allow for proper function of scapular, scapulothoracic and UE control with self care, carrying, lifting, driving/computer work.      Home Exercise Program:    [x] (95805) Reviewed/Progressed HEP activities related to strengthening, flexibility, endurance, ROM of scapular, scapulothoracic and UE control with self care, reaching, carrying, lifting, house/yardwork, driving/computer work  [] (19365) Reviewed/Progressed HEP activities related to improving balance, coordination, kinesthetic sense, posture, motor skill, proprioception of scapular, scapulothoracic and UE control with self care, reaching, carrying, lifting, house/yardwork, driving/computer work      Manual Treatments:  PROM / STM / Oscillations-Mobs:  G-I, II, III, IV (PA's, Inf., Post.)  [x] (35356) Provided manual therapy to mobilize soft tissue/joints of cervical/CT, scapular GHJ and UE for the purpose of modulating pain, promoting relaxation,  increasing ROM, reducing/eliminating soft tissue swelling/inflammation/restriction, improving soft tissue extensibility and allowing for proper ROM for normal function with self care, reaching, carrying, lifting, house/yardwork, driving/computer work    Modalities:  VASO on medium for effusion 10'    Charges:  Timed Code Treatment Minutes: 45   Total Treatment Minutes: 55     [] EVAL  [x] ES(89554) x  2   [] IONTO  [] NMR (64631) x      [x] VASO  [x] Manual (42866) x  1    [] Other:  [] TA x       [] Mech Traction (20247)  [] ES(attended) (44982)      [] ES (un) (04550):     GOALS:  Patient stated goal: Return to full duty at work. Therapist goals for Patient:   Short Term Goals: To be achieved in: 2 weeks  1. Independent in HEP and progression per patient tolerance, in order to prevent re-injury. 2. Patient will have a decrease in pain to facilitate improvement in movement, function, and ADLs as indicated by Functional Deficits. Long Term Goals: To be achieved in: 12 weeks  1. Disability index score of 19% or less for the DASH to assist with reaching prior level of function. 2. Patient will demonstrate increased AROM to 160 shoulder elevation to allow for proper joint functioning as indicated by patients Functional Deficits. 3. Patient will demonstrate an increase in Strength to 5/5 to allow for proper functional mobility as indicated by patients Functional Deficits. 4. Patient will return to functional activities without increased symptoms or restriction.    5. Patient will return to work activities without restriction as cleared by MD. (Patient specific)     Progression Towards Functional goals:  [x] Patient is progressing as expected towards functional goals listed. [] Progression is slowed due to complexities listed. [] Progression has been slowed due to co-morbidities. [] Plan just implemented, too soon to assess goals progression  [] Other:     ASSESSMENT:  ROM continuing to progress gradually. Slight increased reps, reiterate not to overdo activity or force through pain with ex. Black TB to HEP. Improved tolerrance of TB ex. Pt agrees to continue 1x/wk after this week with understanding to continue with HEP. Saw MD, to hold on gym workouts 3 more months.     Treatment/Activity Tolerance:  [x] Patient tolerated treatment well [] Patient limited by fatique  [x] Patient limited by pain  [] Patient limited by other medical complications  [] Other:     Prognosis: [x] Good [] Fair  [] Poor    Patient Requires Follow-up: [x] Yes  [] No    PLAN: See eval  [x] Continue per plan of care [] Alter current plan (see comments)  [] Plan of care initiated [] Hold pending MD visit [] Discharge    Electronically signed by: Angela Turk PT, DPT

## 2019-04-17 ENCOUNTER — HOSPITAL ENCOUNTER (OUTPATIENT)
Dept: PHYSICAL THERAPY | Age: 39
Setting detail: THERAPIES SERIES
Discharge: HOME | End: 2019-04-17
Payer: COMMERCIAL

## 2019-04-17 PROCEDURE — 97110 THERAPEUTIC EXERCISES: CPT

## 2019-04-17 PROCEDURE — 97016 VASOPNEUMATIC DEVICE THERAPY: CPT

## 2019-04-17 PROCEDURE — 97140 MANUAL THERAPY 1/> REGIONS: CPT

## 2019-04-17 NOTE — FLOWSHEET NOTE
[address] [place], [address]                Physical Therapy Daily Treatment Note  Date:  2019    Patient Name:  Curt Paredes :  1980  MRN: [id number]  Restrictions/Precautions:    Medical/Treatment Diagnosis Information:  · Diagnosis: Incomplete tear of R RTC M75.111, Right shoulder pain M25.511  · Treatment Diagnosis: Right shoulder pain C31.996   Insurance/Certification information:  PT Insurance Information: Med [employer]  Physician Information:  Referring Practitioner: Dr. Carlos Marte of care signed (Y/N):     Date of Patient follow up with Physician:  2019    G-Code (if applicable):      Date G-Code Applied:      PT G-Codes  Functional Assessment Tool Used: QDASH   Score: 84% disability  Functional Limitation: Carrying, moving and handling objects  Carrying, Moving and Handling Objects Current Status (): At least 80 percent but less than 100 percent impaired, limited or restricted  Carrying, Moving and Handling Objects Goal Status (): At least 1 percent but less than 20 percent impaired, limited or restricted    Progress Note: [x]  Yes  []  No  Next due by: Visit #10      Latex Allergy:  [x]NO      []YES  Preferred Language for Healthcare:   [x]English       []other:    Visit # Insurance Allowable   8 30     Pain level:  2-3/10     SUBJECTIVE:  Pt will be 7 weeks post op . Moved dresser with left arm last week and felt strain in right shoulder, initially worried he aggravated right side but feels better today. OBJECTIVE: See eval  Observation:   Test measurements:  Some swelling present R shoulder     PROM: R Shoulder Flex: 170 degrees ER:  85 at 90 ABD, 75 IR scap plane (19)    RESTRICTIONS/PRECAUTIONS:  s/p R RTC patch 19;      Exercises/Interventions:   Therapeutic Ex Sets/sec Reps Notes HEP   Elbow ROM      Seated passive ER  Shown with cane    Tables slides flex      Shrugs      Scap squeezes        Pulleys  Wall slides  Functional IR stretch    5'  x10  x3     Supine cane flex  Supine cane ER  SLER  SL ABD  Prone extension  Prone row  TB row/ext    See other flow sheet  x10  x10  2# 2x10  1# x20  3# x30  3# x30  X   60-90 ABD                                                                                    Pt education 10'  Reviewed precautions, sling wear, HEP with progression, goals of PT, use of ice- pt stated understanding           Manual Intervention       Joint oscillations, PROM  RS @ 90 6'  4'                                                NMR re-education                                                                   Therapeutic Exercise and NMR EXR  [x] (44713) Provided verbal/tactile cueing for activities related to strengthening, flexibility, endurance, ROM  for improvements in scapular, scapulothoracic and UE control with self care, reaching, carrying, lifting, house/yardwork, driving/computer work.    [] (47621) Provided verbal/tactile cueing for activities related to improving balance, coordination, kinesthetic sense, posture, motor skill, proprioception  to assist with  scapular, scapulothoracic and UE control with self care, reaching, carrying, lifting, house/yardwork, driving/computer work. Therapeutic Activities:    [] (10322 or 58786) Provided verbal/tactile cueing for activities related to improving balance, coordination, kinesthetic sense, posture, motor skill, proprioception and motor activation to allow for proper function of scapular, scapulothoracic and UE control with self care, carrying, lifting, driving/computer work.      Home Exercise Program:    [x] (46014) Reviewed/Progressed HEP activities related to strengthening, flexibility, endurance, ROM of scapular, scapulothoracic and UE control with self care, reaching, carrying, lifting, house/yardwork, driving/computer work  [] (35582) Reviewed/Progressed HEP activities related to improving balance, coordination, kinesthetic sense, posture, motor skill, proprioception of scapular, scapulothoracic and UE control with self care, reaching, carrying, lifting, house/yardwork, driving/computer work      Manual Treatments:  PROM / STM / Oscillations-Mobs:  G-I, II, III, IV (PA's, Inf., Post.)  [x] (72683) Provided manual therapy to mobilize soft tissue/joints of cervical/CT, scapular GHJ and UE for the purpose of modulating pain, promoting relaxation,  increasing ROM, reducing/eliminating soft tissue swelling/inflammation/restriction, improving soft tissue extensibility and allowing for proper ROM for normal function with self care, reaching, carrying, lifting, house/yardwork, driving/computer work    Modalities:  VASO on medium for effusion 10'    Charges:  Timed Code Treatment Minutes: 45   Total Treatment Minutes: 55     [] EVAL  [x] OQ(65637) x  2   [] IONTO  [] NMR (23782) x      [x] VASO  [x] Manual (03427) x  1    [] Other:  [] TA x       [] Mech Traction (73136)  [] ES(attended) (51006)      [] ES (un) (38813):     GOALS:  Patient stated goal: Return to full duty at work. Therapist goals for Patient:   Short Term Goals: To be achieved in: 2 weeks  1. Independent in HEP and progression per patient tolerance, in order to prevent re-injury. 2. Patient will have a decrease in pain to facilitate improvement in movement, function, and ADLs as indicated by Functional Deficits. Long Term Goals: To be achieved in: 12 weeks  1. Disability index score of 19% or less for the DASH to assist with reaching prior level of function. 2. Patient will demonstrate increased AROM to 160 shoulder elevation to allow for proper joint functioning as indicated by patients Functional Deficits. 3. Patient will demonstrate an increase in Strength to 5/5 to allow for proper functional mobility as indicated by patients Functional Deficits.    4. Patient will return to functional activities without increased symptoms or restriction. 5. Patient will return to work activities without restriction as cleared by MD. (Patient specific)     Progression Towards Functional goals:  [x] Patient is progressing as expected towards functional goals listed. [] Progression is slowed due to complexities listed. [] Progression has been slowed due to co-morbidities. [] Plan just implemented, too soon to assess goals progression  [] Other:     ASSESSMENT:  ROM continuing to progress gradually. Slight increased reps, reiterate not to overdo activity or force through pain with ex. Black TB to HEP. Improved tolerrance of TB ex. Pt agrees to continue 1x/wk after this week with understanding to continue with HEP.       Treatment/Activity Tolerance:  [x] Patient tolerated treatment well [] Patient limited by fatique  [x] Patient limited by pain  [] Patient limited by other medical complications  [] Other:     Prognosis: [x] Good [] Fair  [] Poor    Patient Requires Follow-up: [x] Yes  [] No    PLAN: See eval  [x] Continue per plan of care [] Alter current plan (see comments)  [] Plan of care initiated [] Hold pending MD visit [] Discharge    Electronically signed by: Shon Magallanes PT, DPT

## 2019-04-17 NOTE — FLOWSHEET NOTE
Physical Therapist Assistant Activity Sheet  Date:  2019    Patient Name:  Kayla Cabrales    :  1980  MRN: [id number]  Restrictions/Precautions:    Medical/Treatment Diagnosis Information:  ·   Diagnosis: Incomplete tear of R RTC M75.111, Right shoulder pain M25.511  ·   Treatment Diagnosis: Right shoulder pain M25.511   · 19    Physician Information:    Referring Practitioner: Dr. Quintin Longo Post-op  2wks    4 wks 6 wks 8 wks   3wks 6 wks 9 wks 12 wks                        Activities                                                          DOS/DOI:                                                         Date: 3/25/19 3/28/19 4/3/19 4/10/19 4/17/19           [employer]      Chop                             Chest                             ER Flip                Long/Short lever  Flex. x10 x20                                Scap. x20                                ABd.                 punch                Body/Cardio Blade Flex/Ext                                        IR/ER                                        ABd/ADd                Push-up      Plus                               Wall                             Table                            Floor                No Money/HAB-HAD/Stance                Ball on Wall   x30ea dir x30ea dir X30\" ea dir               Tramp                Theraband    Rows/Ext Green x 30 ea Green x 30 ea Blue x 20 Blue x30 Blue x30, Black x 21                         IR Red x 30 Red x 30  Green x30 Green x30 Blue x 30                         ER Red x 20 Red x 30 Red x 30 Green x25 Blue x 20                         No money                              Horiz.  ABd                              Biceps    3# x30 4# x 30                         Triceps Green x 30 Green x 30 Blue x30 Blue x30 Black x 30                         Punches                Cable Column   Rows                                   Ext. Lat. Pulldown                                   Biceps                                   Triceps                Modality  Vaso 10 minutes      PTA Assessment Introduced IR/ER today, pain and fatigue with ER. Pt ed on painfree ex, not to push through pain Symptoms are much improved today; no pain with TB ex. Cont to emphasize painfree ex, verbalized understanding. Increased resistance with good tolerance. Added ball on wall with good understanding. Gave black band for HEP progression. Cues to reduce shrug with functional lift. Educated to focus on form vs reps currently. Increasing reps and resistance without complaints. Fatigue at conclusion, mild aching stated no increased pain.    Time Based Treatment 15 minutes 15 minutes 20 minutes 20 minutes 20 minutes     Electronically signed by: Divine Oseguera PT

## 2019-04-24 ENCOUNTER — HOSPITAL ENCOUNTER (OUTPATIENT)
Dept: PHYSICAL THERAPY | Age: 39
Setting detail: THERAPIES SERIES
Discharge: HOME | End: 2019-04-24
Payer: COMMERCIAL

## 2019-04-24 PROCEDURE — 97140 MANUAL THERAPY 1/> REGIONS: CPT

## 2019-04-24 PROCEDURE — 97110 THERAPEUTIC EXERCISES: CPT

## 2019-04-24 PROCEDURE — 97016 VASOPNEUMATIC DEVICE THERAPY: CPT

## 2019-04-24 NOTE — FLOWSHEET NOTE
Physical Therapist Assistant Activity Sheet  Date:  2019    Patient Name:  Nikki Mahajan    :  1980  MRN: [id number]  Restrictions/Precautions:    Medical/Treatment Diagnosis Information:  ·   Diagnosis: Incomplete tear of R RTC M75.111, Right shoulder pain M25.511  ·   Treatment Diagnosis: Right shoulder pain M25.511   · 19    Physician Information:    Referring Practitioner: Dr. Danika Diaz Post-op  2wks    4 wks 6 wks 8 wks   3wks 6 wks 9 wks 12 wks                        Activities                                                          DOS/DOI:                                                         Date: 3/25/19 3/28/19 4/3/19 4/10/19 4/17/19 4/24/19            [employer]      Chop                              Chest                              ER Flip                  Long/Short lever  Flex. x10 x20 x20                                Scap. x20 x20                                ABd.                   punch                  Body/Cardio Blade Flex/Ext                                         IR/ER                                         ABd/ADd                  Push-up      Plus                                Wall                              Table                             Floor                  No Money/HAB-HAD/Stance                  Ball on Wall   x30ea dir x30ea dir X30\" ea dir X30\" ea dir               Tramp                  Theraband    Rows/Ext Green x 30 ea Green x 30 ea Blue x 20 Blue x30 Blue x30, Black x 21 Black x30                         IR Red x 30 Red x 30  Green x30 Green x30 Blue x 30 Black x30                         ER Red x 20 Red x 30 Red x 30 Green x25 Blue x 20 Blue x30                         No money                               Horiz.  ABd                               Biceps    3# x30 4# x 30 4# x 30                         Triceps Green x 30 Green x 30 Blue x30 Blue x30 Black x 30 Black x30                         Punches Cable Column   [employer]. Lat. Pulldown                                    Biceps                                    Triceps                  Modality  Vaso 10 minutes       PTA Assessment Introduced IR/ER today, pain and fatigue with ER. Pt ed on painfree ex, not to push through pain Symptoms are much improved today; no pain with TB ex. Cont to emphasize painfree ex, verbalized understanding. Increased resistance with good tolerance. Added ball on wall with good understanding. Gave black band for HEP progression. Cues to reduce shrug with functional lift. Educated to focus on form vs reps currently. Increasing reps and resistance without complaints. Fatigue at conclusion, mild aching stated no increased pain. Increasing reps and weight without complaints. Gave grey TB for HEP progression. Agrees to transition to every other week for progression.    Time Based Treatment 15 minutes 15 minutes 20 minutes 20 minutes 20 minutes 20 minutes     Electronically signed by: Willam Lynch PT

## 2019-04-24 NOTE — FLOWSHEET NOTE
[address] [place], [address]                Physical Therapy Daily Treatment Note  Date:  2019    Patient Name:  Anaid Correa :  1980  MRN: [id number]  Restrictions/Precautions:    Medical/Treatment Diagnosis Information:  · Diagnosis: Incomplete tear of R RTC M75.111, Right shoulder pain M25.511  · Treatment Diagnosis: Right shoulder pain J04.209   Insurance/Certification information:  PT Insurance Information: Med Mut  Physician Information:  Referring Practitioner: Dr. Jraon Herrera of care signed (Y/N):     Date of Patient follow up with Physician:  2019    G-Code (if applicable):      Date G-Code Applied:      PT G-Codes  Functional Assessment Tool Used: QDASH   Score: 84% disability  Functional Limitation: Carrying, moving and handling objects  Carrying, Moving and Handling Objects Current Status (): At least 80 percent but less than 100 percent impaired, limited or restricted  Carrying, Moving and Handling Objects Goal Status (): At least 1 percent but less than 20 percent impaired, limited or restricted    Progress Note: [x]  Yes  []  No  Next due by: Visit #10      Latex Allergy:  [x]NO      []YES  Preferred Language for Healthcare:   [x]English       []other:    Visit # Insurance Allowable   9 30     Pain level:  2-3/10     SUBJECTIVE:  Pt will be 8 weeks post op . Had a great week, shoulder feels good. OBJECTIVE: See eval  Observation:   Test measurements:  Some swelling present R shoulder     PROM: R Shoulder Flex: 170 degrees ER:  85 at 90 ABD, 75 IR scap plane (19)    RESTRICTIONS/PRECAUTIONS:  s/p R RTC patch 19;      Exercises/Interventions:   Therapeutic Ex Sets/sec Reps Notes HEP   Elbow ROM      Seated passive ER  Shown with cane    Tables slides flex      Shrugs      Scap squeezes        Pulleys  Wall slides  Functional IR stretch 5'  x10  x3     Supine cane flex  Supine cane ER  SLER  SL ABD  Prone extension  Prone row  TB row/ext    See other flow sheet  x10  x10  2# x30  1# x30  4# x20  4# x20  X   60-90 ABD                                                                                    Pt education 10'  Reviewed precautions, sling wear, HEP with progression, goals of PT, use of ice- pt stated understanding           Manual Intervention       Joint oscillations, PROM  RS @ 90 3# 6'  4'                                                NMR re-education                                                                   Therapeutic Exercise and NMR EXR  [x] (15600) Provided verbal/tactile cueing for activities related to strengthening, flexibility, endurance, ROM  for improvements in scapular, scapulothoracic and UE control with self care, reaching, carrying, lifting, house/yardwork, driving/computer work.    [] (98390) Provided verbal/tactile cueing for activities related to improving balance, coordination, kinesthetic sense, posture, motor skill, proprioception  to assist with  scapular, scapulothoracic and UE control with self care, reaching, carrying, lifting, house/yardwork, driving/computer work. Therapeutic Activities:    [] (43729 or 35418) Provided verbal/tactile cueing for activities related to improving balance, coordination, kinesthetic sense, posture, motor skill, proprioception and motor activation to allow for proper function of scapular, scapulothoracic and UE control with self care, carrying, lifting, driving/computer work.      Home Exercise Program:    [x] (35669) Reviewed/Progressed HEP activities related to strengthening, flexibility, endurance, ROM of scapular, scapulothoracic and UE control with self care, reaching, carrying, lifting, house/yardwork, driving/computer work  [] (50329) Reviewed/Progressed HEP activities related to improving balance, coordination, kinesthetic sense, posture, motor skill, proprioception of scapular, scapulothoracic and UE control with self care, reaching, carrying, lifting, house/yardwork, driving/computer work      Manual Treatments:  PROM / STM / Oscillations-Mobs:  G-I, II, III, IV (PA's, Inf., Post.)  [x] (32195) Provided manual therapy to mobilize soft tissue/joints of cervical/CT, scapular GHJ and UE for the purpose of modulating pain, promoting relaxation,  increasing ROM, reducing/eliminating soft tissue swelling/inflammation/restriction, improving soft tissue extensibility and allowing for proper ROM for normal function with self care, reaching, carrying, lifting, house/yardwork, driving/computer work    Modalities:  VASO on medium for effusion 10'    Charges:  Timed Code Treatment Minutes: 45   Total Treatment Minutes: 55     [] EVAL  [x] BM(16613) x  2   [] IONTO  [] NMR (08684) x      [x] VASO  [x] Manual (20812) x  1    [] Other:  [] TA x       [] Mech Traction (76592)  [] ES(attended) (85341)      [] ES (un) (25995):     GOALS:  Patient stated goal: Return to full duty at work. Therapist goals for Patient:   Short Term Goals: To be achieved in: 2 weeks  1. Independent in HEP and progression per patient tolerance, in order to prevent re-injury. 2. Patient will have a decrease in pain to facilitate improvement in movement, function, and ADLs as indicated by Functional Deficits. Long Term Goals: To be achieved in: 12 weeks  1. Disability index score of 19% or less for the DASH to assist with reaching prior level of function. 2. Patient will demonstrate increased AROM to 160 shoulder elevation to allow for proper joint functioning as indicated by patients Functional Deficits. 3. Patient will demonstrate an increase in Strength to 5/5 to allow for proper functional mobility as indicated by patients Functional Deficits. 4. Patient will return to functional activities without increased symptoms or restriction.    5. Patient will return to work activities without restriction as cleared by MD. (Patient specific)     Progression Towards Functional goals:  [x] Patient is progressing as expected towards functional goals listed. [] Progression is slowed due to complexities listed. [] Progression has been slowed due to co-morbidities. [] Plan just implemented, too soon to assess goals progression  [] Other:     ASSESSMENT:  ROM continuing to progress gradually. Slight increased reps, reiterate not to overdo activity or force through pain with ex. Sherman TB to HEP. Improved tolerrance of TB ex. Pt agrees to continue 1x/ every other wk after this week with understanding to continue with HEP.       Treatment/Activity Tolerance:  [x] Patient tolerated treatment well [] Patient limited by fatique  [x] Patient limited by pain  [] Patient limited by other medical complications  [] Other:     Prognosis: [x] Good [] Fair  [] Poor    Patient Requires Follow-up: [x] Yes  [] No    PLAN: See eval  [x] Continue per plan of care [] Alter current plan (see comments)  [] Plan of care initiated [] Hold pending MD visit [] Discharge    Electronically signed by: Shon Magallanes PT, DPT

## 2019-04-24 NOTE — FLOWSHEET NOTE
black band for HEP progression. Cues to reduce shrug with functional lift. Educated to focus on form vs reps currently. Increasing reps and resistance without complaints. Fatigue at conclusion, mild aching stated no increased pain. Increased to black TB, pt was very fatigued with ER.  Cuing for delt shrug and reduce twisting with IR/ER   Time Based Treatment 20 minutes 20 minutes 20 minutes     Electronically signed by: Norma Angel PTA

## 2019-05-08 ENCOUNTER — HOSPITAL ENCOUNTER (OUTPATIENT)
Dept: PHYSICAL THERAPY | Age: 39
Setting detail: THERAPIES SERIES
Discharge: HOME | End: 2019-05-08
Payer: COMMERCIAL

## 2019-05-08 ENCOUNTER — TELEPHONE (OUTPATIENT)
Dept: INTERNAL MEDICINE CLINIC | Age: 39
End: 2019-05-08

## 2019-05-08 PROCEDURE — 97140 MANUAL THERAPY 1/> REGIONS: CPT | Performed by: PHYSICAL THERAPIST

## 2019-05-08 PROCEDURE — 97110 THERAPEUTIC EXERCISES: CPT | Performed by: PHYSICAL THERAPIST

## 2019-05-08 PROCEDURE — 97016 VASOPNEUMATIC DEVICE THERAPY: CPT | Performed by: PHYSICAL THERAPIST

## 2019-05-08 NOTE — PLAN OF CARE
[address] [place], [address]                 Physical Therapy Re-Certification Plan of Care    Dear  Nehemiah Williamson,    We had the pleasure of treating the following patient for physical therapy services at [address] [place] [address]. A summary of our findings can be found in the updated assessment below. This includes our plan of care. If you have any questions or concerns regarding these findings, please do not hesitate to contact me at the office phone number checked above.   Thank you for the referral.     Physician Signature:________________________________Date:__________________  By signing above (or electronic signature), therapists plan is approved by physician      Overall Response to Treatment:   [x]Patient is responding well to treatment and improvement is noted with regards  to goals   []Patient should continue to improve in reasonable time if they continue HEP   []Patient has plateaued and is no longer responding to skilled PT intervention    []Patient is getting worse and would benefit from return to referring MD   []Patient unable to adhere to initial POC   []Other:     Date:  2019    Patient Name:  Tameka Al :  1980  MRN: [id number]  Restrictions/Precautions:    Medical/Treatment Diagnosis Information:  · Diagnosis: Incomplete tear of R RTC M75.111, Right shoulder pain M25.511  · Treatment Diagnosis: Right shoulder pain L65.182   Insurance/Certification information:  PT Insurance Information: Med Mut  Physician Information:  Referring Practitioner: Dr. Maxine Keenan of care signed (Y/N):     Date of Patient follow up with Physician:  2019    G-Code (if applicable):      Date G-Code Applied:     QDASH: 39% disability    Progress Note: [x]  Yes  []  No  Next due by: Visit #10      Latex Allergy:  [x]NO      []YES  Preferred Language for Healthcare:   [x]English []other:    Visit # Insurance Allowable   10 30     Pain level:  2-3/10     SUBJECTIVE:  Patient reports shoulder feeling good, states fatigue with new exercises added to HEP last visit. OBJECTIVE: See eval  Observation:   Test measurements:  Some swelling present R shoulder     PROM: R Shoulder Flex: 170 degrees ER:  85 at 90 ABD, 75 IR scap plane (4/17/19)     AROM: Shoulder Flex: 163 ER: T2 IR: T10     MMT: ER: 4-/5 IR: 4/5    RESTRICTIONS/PRECAUTIONS:  s/p R RTC patch 2/28/19; Exercises/Interventions:   Therapeutic Ex Sets/sec Reps Notes HEP   Elbow ROM      Seated passive ER  Shown with cane    Tables slides flex      Shrugs      Scap squeezes        Pulleys  Wall slides  Functional IR stretch    5'  x10  x3     Supine cane flex  Supine cane ER  SLER  SL ABD  Prone extension  Prone row  TB row/ext    See other flow sheet  x10  x10  2# x30  2# x30  4# x30  4# x30  X   60-90 ABD                                                                                    Pt education 10'  Reviewed precautions, sling wear, HEP with progression, goals of PT, use of ice- pt stated understanding           Manual Intervention       Joint oscillations, PROM  RS @ 90 3# 6'  4'                                                NMR re-education                                                                   Therapeutic Exercise and NMR EXR  [x] (10526) Provided verbal/tactile cueing for activities related to strengthening, flexibility, endurance, ROM  for improvements in scapular, scapulothoracic and UE control with self care, reaching, carrying, lifting, house/yardwork, driving/computer work.    [] (58847) Provided verbal/tactile cueing for activities related to improving balance, coordination, kinesthetic sense, posture, motor skill, proprioception  to assist with  scapular, scapulothoracic and UE control with self care, reaching, carrying, lifting, house/yardwork, driving/computer work.     Therapeutic Activities: [] (21768 or 84261) Provided verbal/tactile cueing for activities related to improving balance, coordination, kinesthetic sense, posture, motor skill, proprioception and motor activation to allow for proper function of scapular, scapulothoracic and UE control with self care, carrying, lifting, driving/computer work. Home Exercise Program:    [x] (75510) Reviewed/Progressed HEP activities related to strengthening, flexibility, endurance, ROM of scapular, scapulothoracic and UE control with self care, reaching, carrying, lifting, house/yardwork, driving/computer work  [] (91787) Reviewed/Progressed HEP activities related to improving balance, coordination, kinesthetic sense, posture, motor skill, proprioception of scapular, scapulothoracic and UE control with self care, reaching, carrying, lifting, house/yardwork, driving/computer work      Manual Treatments:  PROM / STM / Oscillations-Mobs:  G-I, II, III, IV (PA's, Inf., Post.)  [x] (59727) Provided manual therapy to mobilize soft tissue/joints of cervical/CT, scapular GHJ and UE for the purpose of modulating pain, promoting relaxation,  increasing ROM, reducing/eliminating soft tissue swelling/inflammation/restriction, improving soft tissue extensibility and allowing for proper ROM for normal function with self care, reaching, carrying, lifting, house/yardwork, driving/computer work    Modalities:  VASO on medium for effusion 10'    Charges:  Timed Code Treatment Minutes: 45   Total Treatment Minutes: 55     [] EVAL  [x] QF(41828) x  2   [] IONTO  [] NMR (26181) x      [x] VASO  [x] Manual (56787) x  1    [] Other:  [] TA x       [] Mech Traction (19992)  [] ES(attended) (74266)      [] ES (un) (50917):     GOALS:  Patient stated goal: Return to full duty at work. Therapist goals for Patient:   Short Term Goals: To be achieved in: 2 weeks  1. Independent in HEP and progression per patient tolerance, in order to prevent re-injury.    2. Patient will have a decrease in pain to facilitate improvement in movement, function, and ADLs as indicated by Functional Deficits. Long Term Goals: To be achieved in: 12 weeks  1. Disability index score of 19% or less for the DASH to assist with reaching prior level of function. 2. Patient will demonstrate increased AROM to 160 shoulder elevation to allow for proper joint functioning as indicated by patients Functional Deficits. 3. Patient will demonstrate an increase in Strength to 5/5 to allow for proper functional mobility as indicated by patients Functional Deficits. 4. Patient will return to functional activities without increased symptoms or restriction. 5. Patient will return to work activities without restriction as cleared by MD. (Patient specific)     Progression Towards Functional goals:  [x] Patient is progressing as expected towards functional goals listed. [] Progression is slowed due to complexities listed. [] Progression has been slowed due to co-morbidities. [] Plan just implemented, too soon to assess goals progression  [] Other:     ASSESSMENT:  See objective assessment above. Patient to continue one time every other week for HEP progression.     Treatment/Activity Tolerance:  [x] Patient tolerated treatment well [] Patient limited by fatique  [x] Patient limited by pain  [] Patient limited by other medical complications  [] Other:     Prognosis: [x] Good [] Fair  [] Poor    Patient Requires Follow-up: [x] Yes  [] No    PLAN: See eval  [x] Continue per plan of care [] Alter current plan (see comments)  [] Plan of care initiated [] Hold pending MD visit [] Discharge    Electronically signed by: Scoo Spencer PT, DPT

## 2019-05-08 NOTE — FLOWSHEET NOTE
Physical Therapist Assistant Activity Sheet  Date:  2019    Patient Name:  Gina Israel    :  1980  MRN: [id number]  Restrictions/Precautions:    Medical/Treatment Diagnosis Information:  ·   Diagnosis: Incomplete tear of R RTC M75.111, Right shoulder pain M25.511  ·   Treatment Diagnosis: Right shoulder pain M25.511   · 19    Physician Information:    Referring Practitioner: Dr. Schneider Fearing Post-op  2wks    4 wks 6 wks 8 wks   3wks 6 wks 9 wks 12 wks                        Activities                                                          DOS/DOI:                                                         Date: 4/10/19 4/17/19 4/24/19 5/8/19          [place]      Chop                            Chest                            ER Flip              Long/Short lever  Flex. x10 x20 X 20 X 30                                Scap. x20 X 20 X 30                                ABd.               punch              Body/Cardio Blade Flex/Ext                                       IR/ER                                       ABd/ADd              Push-up      Plus                              Wall     X 20                       Table                           Floor              No Money/HAB-HAD/Stance              Ball on Wall x30ea dir X30\" ea dir 4 ways x 30 4 ways x 30               Tramp              Theraband    Rows/Ext Blue x30 Blue x30, Black x 21 Black x 30 ea Sherman x 30                         IR Green x30 Blue x 30 Black x 30 Grey x 30                         ER Green x25 Blue x 20 Black x 18 Black x 26                         No money                             Horiz. ABd                             Biceps 3# x30 4# x 30 4# x 30 10# x 30                         Triceps Blue x30 Black x 30 Black x 30 Grey x 30                         Punches              Cable Column   Rows                                  Ext.                                   Jose Damon Biceps                                  Triceps              Modality    Vaso 10 minutes   PTA Assessment Gave black band for HEP progression. Cues to reduce shrug with functional lift. Educated to focus on form vs reps currently. Increasing reps and resistance without complaints. Fatigue at conclusion, mild aching stated no increased pain. Increased to black TB, pt was very fatigued with ER. Cuing for delt shrug and reduce twisting with IR/ER Doing well with all ex, denied pain with ER, inc tolerance and endurance; pt very aware of fatigue/pain limit. Try CC next visit.      Time Based Treatment 20 minutes 20 minutes 20 minutes 20 minutes     Electronically signed by: Antony French PTA

## 2019-05-15 ENCOUNTER — PATIENT MESSAGE (OUTPATIENT)
Dept: INTERNAL MEDICINE CLINIC | Age: 39
End: 2019-05-15

## 2019-05-15 DIAGNOSIS — Z01.84 IMMUNITY STATUS TESTING: Primary | ICD-10-CM

## 2019-05-16 ENCOUNTER — HOSPITAL ENCOUNTER (OUTPATIENT)
Age: 39
Discharge: HOME | End: 2019-05-16
Payer: COMMERCIAL

## 2019-05-16 DIAGNOSIS — Z01.84 IMMUNITY STATUS TESTING: ICD-10-CM

## 2019-05-16 PROCEDURE — 86787 VARICELLA-ZOSTER ANTIBODY: CPT

## 2019-05-16 NOTE — TELEPHONE ENCOUNTER
From: Rigo Andrade III  To: Chris Herzog MD  Sent: 5/15/2019 7:13 PM EDT  Subject: Non-Urgent Medical Question    Dr. Knig Lopes,    I am applying for a second job. As part of that hospital credentialing process I must provide either proof of Varicella immunization or Varicella titer. I had chicken pox when I was a child. Is it possible to get a titer drawn so I can complete my credentialing?     Thanks,  Robson Page

## 2019-05-17 LAB — VZV IGG SER QL IA: 854 IV

## 2019-05-30 ENCOUNTER — HOSPITAL ENCOUNTER (OUTPATIENT)
Dept: PHYSICAL THERAPY | Age: 39
Setting detail: THERAPIES SERIES
Discharge: HOME | End: 2019-05-30
Payer: COMMERCIAL

## 2019-05-30 PROCEDURE — 97110 THERAPEUTIC EXERCISES: CPT | Performed by: PHYSICAL THERAPIST

## 2019-05-30 PROCEDURE — 97112 NEUROMUSCULAR REEDUCATION: CPT | Performed by: PHYSICAL THERAPIST

## 2019-05-30 NOTE — FLOWSHEET NOTE
[address] [place], [address]                 Physical Therapy Flow SHeet         Date:  2019    Patient Name:  Barb Giang :  1980  MRN: [id number]  Restrictions/Precautions:    Medical/Treatment Diagnosis Information:  · Diagnosis: Incomplete tear of R RTC M75.111, Right shoulder pain M25.511  · Treatment Diagnosis: Right shoulder pain Y87.434   Insurance/Certification information:  PT Insurance Information: Med Mut  Physician Information:  Referring Practitioner: Dr. Rojas Seats of care signed (Y/N):     Date of Patient follow up with Physician:  2019    G-Code (if applicable):      Date G-Code Applied:     QDASH: 14% disability    Progress Note: [x]  Yes  []  No  Next due by: Visit #10  6/3/19    Latex Allergy:  [x]NO      []YES  Preferred Language for Healthcare:   [x]English       []other:    Visit # Insurance Allowable   11 30     Pain level0/10     SUBJECTIVE:  Patient reports shoulder feeling good. Over did it the other day and had pain for 3 days but good now    OBJECTIVE:   Observation:   Test measurements:  Some swelling present R shoulder     PROM: R Shoulder Flex: 170 degrees ER:  85 at 90 ABD, 75 IR scap plane      AROM: Shoulder Flex: 165 ER: T2 IR: T10     MMT: ER: 5/5 IR: 5/5,   SHDLR FLEX 5/5  scap  5/5     RESTRICTIONS/PRECAUTIONS:  s/p R RTC patch 19;      Exercises/Interventions:   Therapeutic Ex Sets/sec Reps Notes HEP   Elbow ROM      Seated passive ER  Shown with cane    Tables slides flex      Shrugs      Scap squeezes        Pulleys  Wall slides  Functional IR stretch    5'  x10  x3     Supine cane flex  Supine cane ER  SLER  SL ABD  Prone extension  Prone row  TB row/ext  TB triceps  TB ER  TB IR    x10  x10  3# x30    5# x30  5# x30  [id number]  x30   60-90 ABD          grey  Sherman                Rhythmic stabs   6x30s manual therapy to mobilize soft tissue/joints of cervical/CT, scapular GHJ and UE for the purpose of modulating pain, promoting relaxation,  increasing ROM, reducing/eliminating soft tissue swelling/inflammation/restriction, improving soft tissue extensibility and allowing for proper ROM for normal function with self care, reaching, carrying, lifting, house/yardwork, driving/computer work    Modalities:    Charges:  Timed Code Treatment Minutes: 40   Total Treatment Minutes: 40     [] EVAL  [x] BN(21579) x  2   [] IONTO  [x] NMR (15532) x      [] VASO  [] Manual (16841) x  1    [] Other:  [] TA x       [] Mech Traction (36400)  [] ES(attended) (51949)      [] ES (un) (00784):     GOALS:  Patient stated goal: Return to full duty at work. Therapist goals for Patient:   Short Term Goals: To be achieved in: 2 weeks  1. Independent in HEP and progression per patient tolerance, in order to prevent re-injury. 2. Patient will have a decrease in pain to facilitate improvement in movement, function, and ADLs as indicated by Functional Deficits. Long Term Goals: To be achieved in: 12 weeks  1. Disability index score of 19% or less for the DASH to assist with reaching prior level of function. Met  2. Patient will demonstrate increased AROM to 160 shoulder elevation to allow for proper joint functioning as indicated by patients Functional Deficits. met  3. Patient will demonstrate an increase in Strength to 5/5 to allow for proper functional mobility as indicated by patients Functional Deficits. met  4. Patient will return to functional activities without increased symptoms or restriction. 5. Patient will return to work activities without restriction as cleared by MD. (Patient specific)     Progression Towards Functional goals:  [x] Patient is progressing as expected towards functional goals listed. [] Progression is slowed due to complexities listed. [] Progression has been slowed due to co-morbidities.   [] Plan just implemented, too soon to assess goals progression  [] Other:     ASSESSMENT:  Good motion and strength    Treatment/Activity Tolerance:  [x] Patient tolerated treatment well [] Patient limited by fatique  [x] Patient limited by pain  [] Patient limited by other medical complications  [] Other:     Prognosis: [x] Good [] Fair  [] Poor     Patient Requires Follow-up: [x] Yes  [] No    PLAN: Try HEP for a couple weeks  [x] Continue per plan of care [] Alter current plan (see comments)  [] Plan of care initiated [] Hold pending MD visit [] Discharge    Electronically signed by: Mya Jackson PT,

## 2019-06-07 ENCOUNTER — TELEPHONE (OUTPATIENT)
Dept: INTERNAL MEDICINE CLINIC | Age: 39
End: 2019-06-07

## 2019-06-12 DIAGNOSIS — M76.821 POSTERIOR TIBIAL TENDINITIS OF RIGHT LEG: Primary | ICD-10-CM

## 2019-06-25 ENCOUNTER — ORTHOTIC/BRACE ENCOUNTER (OUTPATIENT)
Dept: ORTHOPEDIC SURGERY | Age: 39
End: 2019-06-25

## 2019-07-06 ENCOUNTER — TELEPHONE (OUTPATIENT)
Dept: INTERNAL MEDICINE CLINIC | Age: 39
End: 2019-07-06

## 2019-07-12 ENCOUNTER — ORTHOTIC/BRACE ENCOUNTER (OUTPATIENT)
Dept: ORTHOPEDIC SURGERY | Age: 39
End: 2019-07-12
Payer: COMMERCIAL

## 2019-07-12 DIAGNOSIS — M76.821 POSTERIOR TIBIAL TENDINITIS OF RIGHT LEG: Primary | ICD-10-CM

## 2019-07-12 PROCEDURE — L3020 FOOT LONGITUD/METATARSAL SUP: HCPCS | Performed by: PEDORTHIST

## 2019-09-16 ENCOUNTER — OFFICE VISIT (OUTPATIENT)
Dept: INTERNAL MEDICINE CLINIC | Age: 39
End: 2019-09-16
Payer: COMMERCIAL

## 2019-09-16 VITALS
BODY MASS INDEX: 40.02 KG/M2 | DIASTOLIC BLOOD PRESSURE: 90 MMHG | SYSTOLIC BLOOD PRESSURE: 136 MMHG | HEART RATE: 119 BPM | OXYGEN SATURATION: 99 % | WEIGHT: 283 LBS

## 2019-09-16 DIAGNOSIS — J06.9 VIRAL URI WITH COUGH: Primary | ICD-10-CM

## 2019-09-16 PROCEDURE — 1036F TOBACCO NON-USER: CPT | Performed by: NURSE PRACTITIONER

## 2019-09-16 PROCEDURE — G8417 CALC BMI ABV UP PARAM F/U: HCPCS | Performed by: NURSE PRACTITIONER

## 2019-09-16 PROCEDURE — G8427 DOCREV CUR MEDS BY ELIG CLIN: HCPCS | Performed by: NURSE PRACTITIONER

## 2019-09-16 PROCEDURE — 99214 OFFICE O/P EST MOD 30 MIN: CPT | Performed by: NURSE PRACTITIONER

## 2019-09-16 RX ORDER — BENZONATATE 100 MG/1
100-200 CAPSULE ORAL 3 TIMES DAILY PRN
Qty: 60 CAPSULE | Refills: 0 | Status: SHIPPED | OUTPATIENT
Start: 2019-09-16 | End: 2019-09-23 | Stop reason: SDUPTHER

## 2019-09-16 RX ORDER — METHYLPREDNISOLONE 4 MG/1
TABLET ORAL
Qty: 1 KIT | Refills: 0 | Status: SHIPPED | OUTPATIENT
Start: 2019-09-16 | End: 2019-09-22

## 2019-09-16 ASSESSMENT — ENCOUNTER SYMPTOMS
NAUSEA: 0
RHINORRHEA: 1
CHEST TIGHTNESS: 0
SINUS PAIN: 0
DIARRHEA: 0
SORE THROAT: 1
CONSTIPATION: 0
SINUS PRESSURE: 1
COUGH: 1
VOMITING: 0
SHORTNESS OF BREATH: 0
WHEEZING: 0

## 2019-09-16 NOTE — PROGRESS NOTES
He appears well-developed and well-nourished. He appears ill. HENT:   Head: Normocephalic and atraumatic. Right Ear: Hearing and external ear normal. A middle ear effusion (Serous) is present. Left Ear: Hearing and external ear normal. A middle ear effusion (Serous) is present. Nose: Nose normal. No mucosal edema. Mouth/Throat: No oral lesions. Posterior oropharyngeal erythema present. No oropharyngeal exudate. Tonsils are 1+ on the right. Tonsils are 1+ on the left. No tonsillar exudate. Eyes: Pupils are equal, round, and reactive to light. Lids are normal.   Neck: Normal range of motion. Cardiovascular: Normal rate, regular rhythm, S1 normal, S2 normal, normal heart sounds and normal pulses. No extrasystoles are present. PMI is not displaced. Exam reveals no gallop, no S3, no S4, no distant heart sounds and no friction rub. No murmur heard. No systolic murmur is present. No diastolic murmur is present. Pulmonary/Chest: Effort normal and breath sounds normal. No accessory muscle usage. No respiratory distress. He has no decreased breath sounds. He has no wheezes. He has no rhonchi. He has no rales. Cough during exam   Abdominal: Soft. Normal appearance and bowel sounds are normal.   Musculoskeletal: Normal range of motion. Lymphadenopathy:        Head (right side): Submandibular and tonsillar adenopathy present. Head (left side): Submandibular and tonsillar adenopathy present. Neurological: He is alert and oriented to person, place, and time. Skin: Skin is warm, dry and intact. Psychiatric: He has a normal mood and affect. His speech is normal.   Nursing note and vitals reviewed. Assessment & Plan: The following diagnoses and conditions are stable with no further orders unlessindicated:    1. Viral URI with cough      Landon Brady was seen today for uri. Diagnoses and all orders for this visit:    Viral URI with cough  -     benzonatate (TESSALON) 100 MG capsule;  Take 1-2

## 2019-09-23 ENCOUNTER — OFFICE VISIT (OUTPATIENT)
Dept: INTERNAL MEDICINE CLINIC | Age: 39
End: 2019-09-23
Payer: COMMERCIAL

## 2019-09-23 VITALS
BODY MASS INDEX: 40.3 KG/M2 | DIASTOLIC BLOOD PRESSURE: 90 MMHG | OXYGEN SATURATION: 96 % | HEART RATE: 79 BPM | WEIGHT: 285 LBS | SYSTOLIC BLOOD PRESSURE: 132 MMHG

## 2019-09-23 DIAGNOSIS — J06.9 VIRAL URI WITH COUGH: ICD-10-CM

## 2019-09-23 DIAGNOSIS — J06.9 URI, ACUTE: Primary | ICD-10-CM

## 2019-09-23 PROCEDURE — G8427 DOCREV CUR MEDS BY ELIG CLIN: HCPCS | Performed by: NURSE PRACTITIONER

## 2019-09-23 PROCEDURE — 1036F TOBACCO NON-USER: CPT | Performed by: NURSE PRACTITIONER

## 2019-09-23 PROCEDURE — G8417 CALC BMI ABV UP PARAM F/U: HCPCS | Performed by: NURSE PRACTITIONER

## 2019-09-23 PROCEDURE — 99214 OFFICE O/P EST MOD 30 MIN: CPT | Performed by: NURSE PRACTITIONER

## 2019-09-23 RX ORDER — BENZONATATE 100 MG/1
100-200 CAPSULE ORAL 3 TIMES DAILY PRN
Qty: 60 CAPSULE | Refills: 0 | Status: SHIPPED | OUTPATIENT
Start: 2019-09-23 | End: 2019-10-07

## 2019-09-23 RX ORDER — CEFUROXIME AXETIL 500 MG/1
500 TABLET ORAL 2 TIMES DAILY
Qty: 14 TABLET | Refills: 0 | Status: SHIPPED | OUTPATIENT
Start: 2019-09-23 | End: 2019-09-30

## 2019-09-23 ASSESSMENT — ENCOUNTER SYMPTOMS
CHEST TIGHTNESS: 0
WHEEZING: 0
SORE THROAT: 0
COUGH: 1
DIARRHEA: 0
SHORTNESS OF BREATH: 0
SINUS PAIN: 0
VOMITING: 0
NAUSEA: 0
CONSTIPATION: 0

## 2019-10-02 ENCOUNTER — OFFICE VISIT (OUTPATIENT)
Dept: INTERNAL MEDICINE CLINIC | Age: 39
End: 2019-10-02
Payer: COMMERCIAL

## 2019-10-02 VITALS
OXYGEN SATURATION: 98 % | TEMPERATURE: 98.7 F | HEART RATE: 92 BPM | WEIGHT: 280 LBS | SYSTOLIC BLOOD PRESSURE: 128 MMHG | BODY MASS INDEX: 39.59 KG/M2 | DIASTOLIC BLOOD PRESSURE: 82 MMHG

## 2019-10-02 DIAGNOSIS — I10 HYPERTENSION, UNSPECIFIED TYPE: ICD-10-CM

## 2019-10-02 DIAGNOSIS — R05.9 COUGH: ICD-10-CM

## 2019-10-02 DIAGNOSIS — J02.9 PHARYNGITIS, UNSPECIFIED ETIOLOGY: Primary | ICD-10-CM

## 2019-10-02 PROCEDURE — G8427 DOCREV CUR MEDS BY ELIG CLIN: HCPCS | Performed by: NURSE PRACTITIONER

## 2019-10-02 PROCEDURE — 1036F TOBACCO NON-USER: CPT | Performed by: NURSE PRACTITIONER

## 2019-10-02 PROCEDURE — 99212 OFFICE O/P EST SF 10 MIN: CPT | Performed by: NURSE PRACTITIONER

## 2019-10-02 PROCEDURE — G8417 CALC BMI ABV UP PARAM F/U: HCPCS | Performed by: NURSE PRACTITIONER

## 2019-10-02 PROCEDURE — G8484 FLU IMMUNIZE NO ADMIN: HCPCS | Performed by: NURSE PRACTITIONER

## 2019-10-02 ASSESSMENT — ENCOUNTER SYMPTOMS
CONSTIPATION: 0
COUGH: 1
WHEEZING: 0
SHORTNESS OF BREATH: 0
VOICE CHANGE: 1
NAUSEA: 0
BLOOD IN STOOL: 0
VOMITING: 0
ABDOMINAL PAIN: 0
DIARRHEA: 0
SORE THROAT: 1
EYE DISCHARGE: 0

## 2019-11-12 ENCOUNTER — HOSPITAL ENCOUNTER (OUTPATIENT)
Dept: PHYSICAL THERAPY | Age: 39
Setting detail: THERAPIES SERIES
Discharge: HOME | End: 2019-11-12
Payer: COMMERCIAL

## 2019-11-12 PROCEDURE — 97110 THERAPEUTIC EXERCISES: CPT

## 2019-11-12 PROCEDURE — 97161 PT EVAL LOW COMPLEX 20 MIN: CPT

## 2019-11-12 PROCEDURE — 97112 NEUROMUSCULAR REEDUCATION: CPT

## 2019-11-21 ENCOUNTER — HOSPITAL ENCOUNTER (OUTPATIENT)
Dept: PHYSICAL THERAPY | Age: 39
Setting detail: THERAPIES SERIES
Discharge: HOME | End: 2019-11-21
Payer: COMMERCIAL

## 2019-11-21 PROCEDURE — 97110 THERAPEUTIC EXERCISES: CPT

## 2019-11-21 PROCEDURE — 97140 MANUAL THERAPY 1/> REGIONS: CPT

## 2019-11-21 PROCEDURE — 97112 NEUROMUSCULAR REEDUCATION: CPT

## 2019-11-25 ENCOUNTER — HOSPITAL ENCOUNTER (OUTPATIENT)
Dept: PHYSICAL THERAPY | Age: 39
Setting detail: THERAPIES SERIES
Discharge: HOME | End: 2019-11-25
Payer: COMMERCIAL

## 2019-11-25 PROCEDURE — 97112 NEUROMUSCULAR REEDUCATION: CPT

## 2019-11-25 PROCEDURE — 97140 MANUAL THERAPY 1/> REGIONS: CPT

## 2019-11-25 PROCEDURE — G0283 ELEC STIM OTHER THAN WOUND: HCPCS

## 2019-11-25 PROCEDURE — 97110 THERAPEUTIC EXERCISES: CPT

## 2019-12-05 ENCOUNTER — HOSPITAL ENCOUNTER (OUTPATIENT)
Dept: PHYSICAL THERAPY | Age: 39
Setting detail: THERAPIES SERIES
Discharge: HOME | End: 2019-12-05
Payer: COMMERCIAL

## 2019-12-05 PROCEDURE — 97110 THERAPEUTIC EXERCISES: CPT

## 2019-12-05 PROCEDURE — 97140 MANUAL THERAPY 1/> REGIONS: CPT

## 2019-12-05 PROCEDURE — 97112 NEUROMUSCULAR REEDUCATION: CPT

## 2019-12-06 ENCOUNTER — OFFICE VISIT (OUTPATIENT)
Dept: INTERNAL MEDICINE CLINIC | Age: 39
End: 2019-12-06
Payer: COMMERCIAL

## 2019-12-06 VITALS
SYSTOLIC BLOOD PRESSURE: 130 MMHG | BODY MASS INDEX: 39.31 KG/M2 | HEART RATE: 79 BPM | WEIGHT: 278 LBS | DIASTOLIC BLOOD PRESSURE: 68 MMHG | OXYGEN SATURATION: 98 %

## 2019-12-06 DIAGNOSIS — I10 ESSENTIAL HYPERTENSION: Primary | ICD-10-CM

## 2019-12-06 DIAGNOSIS — E66.9 OBESITY (BMI 35.0-39.9 WITHOUT COMORBIDITY): ICD-10-CM

## 2019-12-06 PROCEDURE — 99213 OFFICE O/P EST LOW 20 MIN: CPT | Performed by: NURSE PRACTITIONER

## 2019-12-06 PROCEDURE — G8417 CALC BMI ABV UP PARAM F/U: HCPCS | Performed by: NURSE PRACTITIONER

## 2019-12-06 PROCEDURE — 1036F TOBACCO NON-USER: CPT | Performed by: NURSE PRACTITIONER

## 2019-12-06 PROCEDURE — G8484 FLU IMMUNIZE NO ADMIN: HCPCS | Performed by: NURSE PRACTITIONER

## 2019-12-06 PROCEDURE — G8427 DOCREV CUR MEDS BY ELIG CLIN: HCPCS | Performed by: NURSE PRACTITIONER

## 2019-12-06 RX ORDER — LOSARTAN POTASSIUM 25 MG/1
25 TABLET ORAL DAILY
Qty: 90 TABLET | Refills: 3 | Status: SHIPPED | OUTPATIENT
Start: 2019-12-06 | End: 2021-01-07

## 2019-12-06 ASSESSMENT — ENCOUNTER SYMPTOMS
CONSTIPATION: 0
VOMITING: 0
ABDOMINAL PAIN: 0
NAUSEA: 0
BLOOD IN STOOL: 0
EYE DISCHARGE: 0
COUGH: 0
WHEEZING: 0
SHORTNESS OF BREATH: 0
DIARRHEA: 0

## 2019-12-12 ENCOUNTER — HOSPITAL ENCOUNTER (OUTPATIENT)
Dept: PHYSICAL THERAPY | Age: 39
Setting detail: THERAPIES SERIES
Discharge: HOME | End: 2019-12-12
Payer: COMMERCIAL

## 2019-12-12 PROCEDURE — 97110 THERAPEUTIC EXERCISES: CPT

## 2019-12-12 PROCEDURE — 97140 MANUAL THERAPY 1/> REGIONS: CPT

## 2019-12-17 ENCOUNTER — HOSPITAL ENCOUNTER (OUTPATIENT)
Dept: PHYSICAL THERAPY | Age: 39
Setting detail: THERAPIES SERIES
Discharge: HOME | End: 2019-12-17
Payer: COMMERCIAL

## 2019-12-17 PROCEDURE — 97110 THERAPEUTIC EXERCISES: CPT

## 2019-12-17 PROCEDURE — 97140 MANUAL THERAPY 1/> REGIONS: CPT

## 2019-12-26 ENCOUNTER — HOSPITAL ENCOUNTER (OUTPATIENT)
Dept: PHYSICAL THERAPY | Age: 39
Setting detail: THERAPIES SERIES
Discharge: HOME | End: 2019-12-26
Payer: COMMERCIAL

## 2019-12-31 ENCOUNTER — HOSPITAL ENCOUNTER (OUTPATIENT)
Dept: PHYSICAL THERAPY | Age: 39
Setting detail: THERAPIES SERIES
Discharge: HOME | End: 2019-12-31
Payer: COMMERCIAL

## 2019-12-31 PROCEDURE — 97012 MECHANICAL TRACTION THERAPY: CPT

## 2019-12-31 PROCEDURE — 97140 MANUAL THERAPY 1/> REGIONS: CPT

## 2019-12-31 PROCEDURE — 97110 THERAPEUTIC EXERCISES: CPT

## 2019-12-31 NOTE — FLOWSHEET NOTE
(typically 30 minutes face-to-face)  [] EVAL (HIGH) 12497 (typically 45 minutes face-to-face)  [] RE-EVAL     [x] QM(09615) 1    [] IONTO  [] NMR (04236) x     [] VASO  [x] Manual (44286) 2     [] Other:  [] TA x      [x] Mech Traction (89744)  [] ES(attended) (57924)      [] ES (un) (00861):         ASSESSMENT:  Pt pradeep session well. Responded well to traction      GOALS:      Patient stated goal: reduce pain    Therapist goals for Patient:   Short Term Goals: To be achieved in: 2 weeks  1. Independent in HEP and progression per patient tolerance, in order to prevent re-injury. [x] Progressing: [] Met: [] Not Met: [] Adjusted     2. Patient will have a decrease in pain to facilitate improvement in movement, function, and ADLs as indicated by Functional Deficits. [x] Progressing: [] Met: [] Not Met: [] Adjusted     Long Term Goals: To be achieved in: 6 weeks  1. Disability index score of 23% or less for the DASH to assist with reaching prior level of function. [x] Progressing: [] Met: [] Not Met: [] Adjusted     2. Patient will demonstrate increased posture to allow for proper joint functioning as indicated by patients Functional Deficits. [x] Progressing: [] Met: [] Not Met: [] Adjusted     3. Patient will demonstrate an increase in Strength to 5/5 to allow for proper functional mobility as indicated by patients Functional Deficits. [x] Progressing: [] Met: [] Not Met: [] Adjusted     4. Patient will return to reaching functional activities without increased symptoms or restriction. [x] Progressing: [] Met: [] Not Met: [] Adjusted     5. Pt will report improved ability to sleep. (patient specific functional goal)    [x] Progressing: [] Met: [] Not Met: [] Adjusted         Overall Progression Towards Functional goals/ Treatment Progress Update:  [] Patient is progressing as expected towards functional goals listed. [] Progression is slowed due to complexities/Impairments listed.   [] Progression has been slowed due to co-morbidities. [x] Plan just implemented, too soon to assess goals progression <30days   [] Goals require adjustment due to lack of progress  [] Patient is not progressing as expected and requires additional follow up with physician  [] Other    Prognosis for POC: [x] Good [] Fair  [] Poor      Patient requires continued skilled intervention: [x] Yes  [] No    Treatment/Activity Tolerance:  [x] Patient able to complete treatment  [] Patient limited by fatigue  [] Patient limited by pain    [] Patient limited by other medical complications  [] Other:           PLAN: See eval  [x] Continue per plan of care [] Alter current plan (see comments above)  [] Plan of care initiated [] Hold pending MD visit [] Discharge      Electronically signed by:  William Solis PT    Note: If patient does not return for scheduled/ recommended follow up visits, this note will serve as a discharge from care along with most recent update on progress.

## 2020-01-06 ENCOUNTER — HOSPITAL ENCOUNTER (OUTPATIENT)
Dept: PHYSICAL THERAPY | Age: 40
Setting detail: THERAPIES SERIES
Discharge: HOME | End: 2020-01-06
Payer: COMMERCIAL

## 2020-01-28 ENCOUNTER — HOSPITAL ENCOUNTER (OUTPATIENT)
Dept: PHYSICAL THERAPY | Age: 40
Setting detail: THERAPIES SERIES
Discharge: HOME | End: 2020-01-28
Payer: COMMERCIAL

## 2020-01-28 PROCEDURE — 20561 NDL INSJ W/O NJX 3+ MUSC: CPT

## 2020-01-28 PROCEDURE — 97110 THERAPEUTIC EXERCISES: CPT

## 2020-01-28 PROCEDURE — 97164 PT RE-EVAL EST PLAN CARE: CPT

## 2020-01-28 PROCEDURE — 97140 MANUAL THERAPY 1/> REGIONS: CPT

## 2020-01-28 NOTE — PLAN OF CARE
None    Occupation/School: Nurse anesthetist. 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 Living Status/Prior Level of Function: Independent with ADLs and IADLs. OBJECTIVE:     CERVICAL ROM RIGHT LEFT   Cervical Flexion 40    Cervical Extension 35    Cervical Lateral Flexion 40 40   Cervical Rotation 70 70        ROM RIGHT LEFT   Shoulder Flexion 170 165   Shoulder Abduction 165 160   Shoulder External Rotation 70 60   Shoulder Internal Rotation T7 T4                  Strength  RIGHT LEFT   Shoulder Flexion 5 5   Shoulder Abduction 5 5   Shoulder External Rotation 5 5   Shoulder Internal Rotation 5 5               Reflexes/Sensation:    [x]Dermatomes/Myotomes intact    [x]Reflexes equal and normal bilaterally   []Other:    Joint mobility:    [x]Normal- shoulder    [x]Hypo- thoracic   []Hyper    Palpation: ttp greater tuberosity, upper galvez and levator. Functional Mobility/Transfers: Independent    Posture: Mild rounded shoulders forward head     Bandages/Dressings/Incisions: None    Gait: (include devices/WB status): WNL    Orthopedic Special Tests: Neg Spurling, speeds, lift off. [x] Patient history, allergies, meds reviewed. Medical chart reviewed. See intake form. Review Of Systems (ROS):  [x]Performed Review of systems (Integumentary, CardioPulmonary, Neurological) by intake and observation. Intake form has been scanned into medical record. Patient has been instructed to contact their primary care physician regarding ROS issues if not already being addressed at this time.       Co-morbidities/Complexities (which will affect course of rehabilitation):   [x]None           Arthritic conditions   []Rheumatoid arthritis (M05.9)  []Osteoarthritis (M19.91)   Cardiovascular conditions   []Hypertension (I10)  []Hyperlipidemia (E78.5)  []Angina pectoris (I20)  []Atherosclerosis (I70)   Musculoskeletal conditions   []Disc pathology   []Congenital spine pathologies   []Prior surgical intervention  []Osteoporosis personal factors and/or comorbidities that impact the plan of care  []3 personal factors and/or comorbidities that impact the plan of care  [x] An examination of body systems using standardized tests and measures addressing any of the following: body structures and functions (impairments), activity limitations, and/or participation restrictions;:  [x] a total of 1-2 or more elements   [] a total of 3 or more elements   [] a total of 4 or more elements   [x] A clinical presentation with:  [x] stable and/or uncomplicated characteristics   [] evolving clinical presentation with changing characteristics  [] unstable and unpredictable characteristics;   [x] Clinical decision making of [] low, [] moderate, [] high complexity using standardized patient assessment instrument and/or measurable assessment of functional outcome. [] EVAL (LOW) 46958 (typically 20 minutes face-to-face)  [] EVAL (MOD) 27843 (typically 30 minutes face-to-face)  [] EVAL (HIGH) 82838 (typically 45 minutes face-to-face)  [x] RE-EVAL     PLAN:  Frequency/Duration:  2 days per week for 6 Weeks:  INTERVENTIONS:  [x] Therapeutic exercise including: strength training, ROM, for Upper extremity and core   [x]  NMR activation and proprioception for UE, scap and Core   [x] Manual therapy as indicated for shoulder, scapula and spine to include: Dry Needling/IASTM, STM, PROM, Gr I-IV mobilizations, manipulation. [x] Modalities as needed that may include: thermal agents, E-stim, Biofeedback, US, iontophoresis as indicated  [] Patient education on joint protection, postural re-education, activity modification, progression of HEP. HEP instruction: (see scanned forms)    GOALS:  Patient stated goal: reduce pain    Therapist goals for Patient:   Short Term Goals: To be achieved in: 2 weeks  1. Independent in HEP and progression per patient tolerance, in order to prevent re-injury. [] Progressing: [] Met: [] Not Met: [] Adjusted     2.  Patient will have a decrease in pain to facilitate improvement in movement, function, and ADLs as indicated by Functional Deficits. [] Progressing: [] Met: [] Not Met: [] Adjusted     Long Term Goals: To be achieved in: 6 weeks  1. Disability index score of 10% or less for the NDI to assist with reaching prior level of function. [] Progressing: [] Met: [] Not Met: [] Adjusted     2. Patient will demonstrate increased posture to allow for proper joint functioning as indicated by patients Functional Deficits. [] Progressing: [] Met: [] Not Met: [] Adjusted     3. Patient will have no symptoms past shoulder while driving. [] Progressing: [] Met: [] Not Met: [] Adjusted      4. Patient will return to reaching functional activities without increased symptoms or restriction. [] Progressing: [] Met: [] Not Met: [] Adjusted     5.  Pt will report improved ability to sleep. (patient specific functional goal)    [] Progressing: [] Met: [] Not Met: [] Adjusted      Electronically signed by:  Candelaria Howe PT

## 2020-02-07 ENCOUNTER — HOSPITAL ENCOUNTER (OUTPATIENT)
Dept: PHYSICAL THERAPY | Age: 40
Setting detail: THERAPIES SERIES
Discharge: HOME | End: 2020-02-07
Payer: COMMERCIAL

## 2020-02-07 PROCEDURE — 20561 NDL INSJ W/O NJX 3+ MUSC: CPT

## 2020-02-07 PROCEDURE — 97012 MECHANICAL TRACTION THERAPY: CPT

## 2020-02-07 PROCEDURE — 97140 MANUAL THERAPY 1/> REGIONS: CPT

## 2020-02-07 NOTE — FLOWSHEET NOTE
proprioception  to assist with  scapular, scapulothoracic and UE control with self care, reaching, carrying, lifting, house/yardwork, driving/computer work. Therapeutic Activities:    [x] (97024 or 81398) Provided verbal/tactile cueing for activities related to improving balance, coordination, kinesthetic sense, posture, motor skill, proprioception and motor activation to allow for proper function of scapular, scapulothoracic and UE control with self care, carrying, lifting, driving/computer work. Home Exercise Program:    [x] (44852) Reviewed/Progressed HEP activities related to strengthening, flexibility, endurance, ROM of scapular, scapulothoracic and UE control with self care, reaching, carrying, lifting, house/yardwork, driving/computer work  [] (89420) Reviewed/Progressed HEP activities related to improving balance, coordination, kinesthetic sense, posture, motor skill, proprioception of scapular, scapulothoracic and UE control with self care, reaching, carrying, lifting, house/yardwork, driving/computer work      Manual Treatments:  PROM / STM / Oscillations-Mobs:  G-I, II, III, IV (PA's, Inf., Post.)  [x] (99554) Provided manual therapy to mobilize soft tissue/joints of cervical/CT, scapular GHJ and UE for the purpose of modulating pain, promoting relaxation,  increasing ROM, reducing/eliminating soft tissue swelling/inflammation/restriction, improving soft tissue extensibility and allowing for proper ROM for normal function with self care, reaching, carrying, lifting, house/yardwork, driving/computer work    Modalities:     [] GAME READY (VASO)- for significant edema, swelling, pain control.      Traction to neck 10' 22# max- 12# min        Charges:  Timed Code Treatment Minutes: 15   Total Treatment Minutes: 40      [] EVAL (LOW) 56368 (typically 20 minutes face-to-face)  [] EVAL (MOD) 49943 (typically 30 minutes face-to-face)  [] EVAL (HIGH) 31443 (typically 45 minutes face-to-face)  [x] RE-EVAL

## 2020-02-14 ENCOUNTER — HOSPITAL ENCOUNTER (OUTPATIENT)
Dept: PHYSICAL THERAPY | Age: 40
Setting detail: THERAPIES SERIES
Discharge: HOME | End: 2020-02-14
Payer: COMMERCIAL

## 2020-02-14 PROCEDURE — 20561 NDL INSJ W/O NJX 3+ MUSC: CPT

## 2020-02-14 PROCEDURE — 97012 MECHANICAL TRACTION THERAPY: CPT

## 2020-02-14 PROCEDURE — 97140 MANUAL THERAPY 1/> REGIONS: CPT

## 2020-02-14 NOTE — FLOWSHEET NOTE
[facility], [address] [place] [address], [address] [place] [address] [address]  Phone: [phone]   Fax:     [phone]      Physical Therapy Treatment Note/ Progress Report:     Date:  2020    Patient Name:  Izabel Mcdaniels    :  1980  MRN: [id number]  Restrictions/Precautions:    Medical/Treatment Diagnosis Information:  · Diagnosis: Right RC tendonitis  · Treatment Diagnosis: Decreased right shoulder ROM, strength  Insurance/Certification information:  PT Insurance Information: Med mutual 21 PCY   Physician Information:  Referring Practitioner: Dr. Laisha Khan  Has the plan of care been signed (Y/N):        []  Yes  [x]  No     Date of Patient follow up with Physician: LATANYA    Assessment Summary: Brinda Rojas is a 35 y.o. male reporting to OP PT with c/c of right sholder which has been occurring since August without known CAR. PT did have right RCR in February but he relatively good recovery. Pt is noted to have good ROM and strength. Tender to upper trap and levator. He does have occasional distal symptoms past elbow. Reflexes are symmetrical bilaterally. Mild reduction in posture. Is this a Progress Report:     []  Yes  [x]  No        If Yes:  Date Range for reporting period:  Beginning 20  Ending    Progress report will be due (10 Rx or 30 days whichever is less):        Recertification will be due (POC Duration  / 90 days whichever is less):         Visit # Insurance Allowable Auth Required   3 20 []  Yes [x]  No        Functional Scale: Q dash 43%; NDI 30%   Date assessed:       Latex Allergy:  [x]NO      []YES  Preferred Language for Healthcare:   [x]English       []other:      Pain level:  1-210     SUBJECTIVE: Pt states still have some scapular pain and increased shoulder pain today.      OBJECTIVE: ttp T5      RESTRICTIONS/PRECAUTIONS: None    Exercises/Interventions:       Therapeutic Ex (74969) HEP 20 Warm-up       Pulleys       UBE  2'/2', Lv 5 2'/2', Lv 5 2'/2', Lv 5          TABLE       SP       Concntric circles       Transverse thoracic stretch x      Longitudinal thoeracic stretch x      SL thoracic rotations       SL Abduction       SL concentric circles       SL ABC       SL SP       Prone SURAEZ       90/90 Eccentric IR       90/90 eccentric ER                     SEATED       UT stretch x reviewed     Levator stretch x reviewed     Cervical retraction x x10                   STANDING       IR/ER/EXT x      Rows x      Pec stretch x      Eccentric abduction x      Median ulnar nerve glides       Ulnar nerve glides       Bicep stretch       Thoracic rolling       Thoracic rotations       Shoulder depressions       Saw ball       Wall ball circles flex/abd       Quick ball drops flex/abd       Wall walks       Wall walks with ER       HA       D1 extension                Manual: Inferior first rib mobilizations grade II/lll, STM to UT, levator and biceps Suboccipital release, manual traction, GH rotations stretching at 90° abduction, scapular mobs, prone thoracicPA grade II-IV- cavitatio noted at t4-5Pt educated on dry needling risks and benefits. All questions answered. Pt has filled out consent form. Dry needling was performed to upper trap in supine using 0.30x40 needles and right upper trap, right side C4-7 and levator in prone using 0.30x40 needle, infraspinatus. Standard precautions taken to avoid lung spaces, all vessels.      Therapeutic Exercise and NMR EXR  [x] (39860) Provided verbal/tactile cueing for activities related to strengthening, flexibility, endurance, ROM  for improvements in scapular, scapulothoracic and UE control with self care, reaching, carrying, lifting, house/yardwork, driving/computer work.    [] (97612) Provided verbal/tactile cueing for activities related to improving balance, coordination, kinesthetic sense, posture, motor skill, proprioception  to assist with  scapular, scapulothoracic and UE control with self care, reaching, carrying, lifting, house/yardwork, driving/computer work. Therapeutic Activities:    [x] (28141 or 29565) Provided verbal/tactile cueing for activities related to improving balance, coordination, kinesthetic sense, posture, motor skill, proprioception and motor activation to allow for proper function of scapular, scapulothoracic and UE control with self care, carrying, lifting, driving/computer work. Home Exercise Program:    [x] (70213) Reviewed/Progressed HEP activities related to strengthening, flexibility, endurance, ROM of scapular, scapulothoracic and UE control with self care, reaching, carrying, lifting, house/yardwork, driving/computer work  [] (52251) Reviewed/Progressed HEP activities related to improving balance, coordination, kinesthetic sense, posture, motor skill, proprioception of scapular, scapulothoracic and UE control with self care, reaching, carrying, lifting, house/yardwork, driving/computer work      Manual Treatments:  PROM / STM / Oscillations-Mobs:  G-I, II, III, IV (PA's, Inf., Post.)  [x] (60508) Provided manual therapy to mobilize soft tissue/joints of cervical/CT, scapular GHJ and UE for the purpose of modulating pain, promoting relaxation,  increasing ROM, reducing/eliminating soft tissue swelling/inflammation/restriction, improving soft tissue extensibility and allowing for proper ROM for normal function with self care, reaching, carrying, lifting, house/yardwork, driving/computer work    Modalities:     [] GAME READY (VASO)- for significant edema, swelling, pain control.      Traction to neck 10' 26# max- 12# min        Charges:  Timed Code Treatment Minutes: 30   Total Treatment Minutes: 40      [] EVAL (LOW) 61122 (typically 20 minutes face-to-face)  [] EVAL (MOD) 26028 (typically 30 minutes face-to-face)  [] EVAL (HIGH) 40981 (typically 45 minutes face-to-face)  [] RE-EVAL     [] LZ(43579) 1    [] IONTO  [] NMR (89520) x     [] VASO  [x] Manual (43627) 2     [x] Other: dry needling  [] TA x      [x] [facility] Traction (54899)  [] ES(attended) (09266)      [] ES (un) (92871):         ASSESSMENT:  Pt pradeep session well. Responds well to traction      GOALS:      Patient stated goal: reduce pain    Therapist goals for Patient:   Short Term Goals: To be achieved in: 2 weeks  1. Independent in HEP and progression per patient tolerance, in order to prevent re-injury. []? Progressing: []? Met: []? Not Met: []? Adjusted      2. Patient will have a decrease in pain to facilitate improvement in movement, function, and ADLs as indicated by Functional Deficits. []? Progressing: []? Met: []? Not Met: []? Adjusted      Long Term Goals: To be achieved in: 6 weeks  1. Disability index score of 10% or less for the NDI to assist with reaching prior level of function. []? Progressing: []? Met: []? Not Met: []? Adjusted      2. Patient will demonstrate increased posture to allow for proper joint functioning as indicated by patients Functional Deficits. []? Progressing: []? Met: []? Not Met: []? Adjusted      3. Patient will have no symptoms past shoulder while driving.    []? Progressing: []? Met: []? Not Met: []? Adjusted      4. Patient will return to reaching functional activities without increased symptoms or restriction. []? Progressing: []? Met: []? Not Met: []? Adjusted      5. Pt will report improved ability to sleep. (patient specific functional goal)    []? Progressing: []? Met: []? Not Met: []? Adjusted             Overall Progression Towards Functional goals/ Treatment Progress Update:  [] Patient is progressing as expected towards functional goals listed. [] Progression is slowed due to complexities/Impairments listed. [] Progression has been slowed due to co-morbidities.   [x] Plan just implemented, too soon to assess goals progression <30days   [] Goals require adjustment due to lack of progress  [] Patient is not progressing as

## 2020-02-18 ENCOUNTER — HOSPITAL ENCOUNTER (OUTPATIENT)
Dept: PHYSICAL THERAPY | Age: 40
Setting detail: THERAPIES SERIES
Discharge: HOME | End: 2020-02-18
Payer: COMMERCIAL

## 2020-02-18 PROCEDURE — 20561 NDL INSJ W/O NJX 3+ MUSC: CPT

## 2020-02-18 PROCEDURE — 97012 MECHANICAL TRACTION THERAPY: CPT

## 2020-02-18 PROCEDURE — 97140 MANUAL THERAPY 1/> REGIONS: CPT

## 2020-02-18 NOTE — PROGRESS NOTES
5 2'/2', Lv 5         TABLE      SP      Concntric circles      Transverse thoracic stretch x     Longitudinal thoeracic stretch x     SL thoracic rotations      SL Abduction      SL concentric circles      SL ABC      SL SP      Prone SUAREZ      90/90 Eccentric IR      90/90 eccentric ER      Nano pose   1'x2         SEATED      UT stretch x     Levator stretch x     Cervical retraction x                 STANDING      IR/ER/EXT x     Rows x     Pec stretch x     Eccentric abduction x     Median ulnar nerve glides      Ulnar nerve glides      Bicep stretch      Thoracic rolling      Thoracic rotations      Shoulder depressions      Saw ball      Wall ball circles flex/abd      Quick ball drops flex/abd      Wall walks      Wall walks with ER      HA      D1 extension      Prone y   10x3     Manual: Inferior first rib mobilizations grade II/lll, STM to UT, levator and biceps Suboccipital release, manual traction, GH rotations stretching at 90° abduction, scapular mobs, prone thoracicPA grade II-IV- cavitatio noted at t4-5Pt educated on dry needling risks and benefits. All questions answered. Pt has filled out consent form. Dry needling was performed to upper trap in supine using 0.30x40 needles and right upper trap, right side C4-7 and levator in prone using 0.30x40 needle, infraspinatus. Standard precautions taken to avoid lung spaces, all vessels.      Therapeutic Exercise and NMR EXR  [x] (50740) Provided verbal/tactile cueing for activities related to strengthening, flexibility, endurance, ROM  for improvements in scapular, scapulothoracic and UE control with self care, reaching, carrying, lifting, house/yardwork, driving/computer work.    [] (18858) Provided verbal/tactile cueing for activities related to improving balance, coordination, kinesthetic sense, posture, motor skill, proprioception  to assist with  scapular, scapulothoracic and UE control with self care, reaching, carrying, lifting, house/yardwork, Other    Prognosis for POC: [x] Good [] Fair  [] Poor      Patient requires continued skilled intervention: [x] Yes  [] No    Treatment/Activity Tolerance:  [x] Patient able to complete treatment  [] Patient limited by fatigue  [] Patient limited by pain    [] Patient limited by other medical complications  [] Other:           PLAN: See eval  [x] Continue per plan of care [] Alter current plan (see comments above)  [] Plan of care initiated [] Hold pending MD visit [] Discharge      Electronically signed by:  Nile Jiménez PT    Note: If patient does not return for scheduled/ recommended follow up visits, this note will serve as a discharge from care along with most recent update on progress.

## 2020-02-25 ENCOUNTER — HOSPITAL ENCOUNTER (OUTPATIENT)
Dept: PHYSICAL THERAPY | Age: 40
Setting detail: THERAPIES SERIES
Discharge: HOME | End: 2020-02-25
Payer: COMMERCIAL

## 2020-02-25 NOTE — FLOWSHEET NOTE
[facility], [address] [place] [address], [address] [place] [address] [address]  Phone: [phone]   Fax:     [phone]    Physical Therapy  Cancellation/No-show Note  Patient Name:  Edilma Tobar  :  1980   Date:  2020    Cancelled visits to date: 1  No-shows to date: 2    For today's appointment patient:  [x]  Cancelled  []  Rescheduled appointment  []  No-show     Reason given by patient:  []  Patient ill  []  Conflicting appointment  []  No transportation    []  Conflict with work  []  No reason given  []  Other:     Comments:      Phone call information:   []  Phone call made today to patient at _ time at number provided:      []  Patient answered, conversation as follows:    []  Patient did not answer, message left as follows:  []  Phone call not made today  []  Phone call not needed - pt contacted us to cancel and provided reason for cancellation.      Electronically signed by:  Omar Solomon PT

## 2020-03-20 ENCOUNTER — OFFICE VISIT (OUTPATIENT)
Dept: INTERNAL MEDICINE CLINIC | Age: 40
End: 2020-03-20
Payer: COMMERCIAL

## 2020-03-20 VITALS
OXYGEN SATURATION: 99 % | BODY MASS INDEX: 36.36 KG/M2 | HEIGHT: 70 IN | SYSTOLIC BLOOD PRESSURE: 120 MMHG | DIASTOLIC BLOOD PRESSURE: 78 MMHG | WEIGHT: 254 LBS | HEART RATE: 97 BPM

## 2020-03-20 PROCEDURE — G8417 CALC BMI ABV UP PARAM F/U: HCPCS | Performed by: NURSE PRACTITIONER

## 2020-03-20 PROCEDURE — G8427 DOCREV CUR MEDS BY ELIG CLIN: HCPCS | Performed by: NURSE PRACTITIONER

## 2020-03-20 PROCEDURE — 1036F TOBACCO NON-USER: CPT | Performed by: NURSE PRACTITIONER

## 2020-03-20 PROCEDURE — 99214 OFFICE O/P EST MOD 30 MIN: CPT | Performed by: NURSE PRACTITIONER

## 2020-03-20 PROCEDURE — G8484 FLU IMMUNIZE NO ADMIN: HCPCS | Performed by: NURSE PRACTITIONER

## 2020-03-20 RX ORDER — CLARITHROMYCIN 500 MG/1
500 TABLET, COATED ORAL 2 TIMES DAILY
Qty: 14 TABLET | Refills: 0 | Status: SHIPPED | OUTPATIENT
Start: 2020-03-20 | End: 2020-03-20

## 2020-03-20 RX ORDER — CLARITHROMYCIN 500 MG/1
500 TABLET, COATED ORAL 2 TIMES DAILY
Qty: 14 TABLET | Refills: 0 | Status: SHIPPED | OUTPATIENT
Start: 2020-03-20 | End: 2020-03-27

## 2020-03-20 ASSESSMENT — PATIENT HEALTH QUESTIONNAIRE - PHQ9
SUM OF ALL RESPONSES TO PHQ QUESTIONS 1-9: 0
SUM OF ALL RESPONSES TO PHQ9 QUESTIONS 1 & 2: 0
1. LITTLE INTEREST OR PLEASURE IN DOING THINGS: 0
SUM OF ALL RESPONSES TO PHQ QUESTIONS 1-9: 0
2. FEELING DOWN, DEPRESSED OR HOPELESS: 0

## 2020-03-20 ASSESSMENT — ENCOUNTER SYMPTOMS
VOICE CHANGE: 1
SINUS PRESSURE: 1
SHORTNESS OF BREATH: 0
COUGH: 1
DIARRHEA: 0
VOMITING: 0
SINUS COMPLAINT: 1
WHEEZING: 0
RHINORRHEA: 1
SORE THROAT: 1
NAUSEA: 0
SINUS PAIN: 0
CHEST TIGHTNESS: 0
CONSTIPATION: 0
SWOLLEN GLANDS: 0

## 2020-03-23 ENCOUNTER — NURSE TRIAGE (OUTPATIENT)
Dept: OTHER | Facility: CLINIC | Age: 40
End: 2020-03-23

## 2020-09-01 ENCOUNTER — OFFICE VISIT (OUTPATIENT)
Dept: INTERNAL MEDICINE CLINIC | Age: 40
End: 2020-09-01
Payer: COMMERCIAL

## 2020-09-01 VITALS
OXYGEN SATURATION: 98 % | SYSTOLIC BLOOD PRESSURE: 148 MMHG | HEART RATE: 107 BPM | WEIGHT: 252 LBS | TEMPERATURE: 97.3 F | BODY MASS INDEX: 36.16 KG/M2 | DIASTOLIC BLOOD PRESSURE: 94 MMHG

## 2020-09-01 PROCEDURE — 99213 OFFICE O/P EST LOW 20 MIN: CPT | Performed by: INTERNAL MEDICINE

## 2020-09-01 PROCEDURE — G8417 CALC BMI ABV UP PARAM F/U: HCPCS | Performed by: INTERNAL MEDICINE

## 2020-09-01 PROCEDURE — 1036F TOBACCO NON-USER: CPT | Performed by: INTERNAL MEDICINE

## 2020-09-01 PROCEDURE — G8427 DOCREV CUR MEDS BY ELIG CLIN: HCPCS | Performed by: INTERNAL MEDICINE

## 2020-09-01 RX ORDER — BUPROPION HYDROCHLORIDE 75 MG/1
TABLET ORAL
Qty: 57 TABLET | Refills: 3 | Status: SHIPPED | OUTPATIENT
Start: 2020-09-01 | End: 2020-12-10

## 2020-09-02 ASSESSMENT — ENCOUNTER SYMPTOMS
VOMITING: 0
SHORTNESS OF BREATH: 0
NAUSEA: 0
COUGH: 0
CONSTIPATION: 0
ABDOMINAL DISTENTION: 0
CHEST TIGHTNESS: 0
BACK PAIN: 0
DIARRHEA: 0
WHEEZING: 0

## 2020-09-02 NOTE — PROGRESS NOTES
9/1/2020  Ora Monk III  1980      Chief Complaint   Patient presents with    Neck Pain       HPI  Pt with c/o several month h/o depression. Lack of energy and motivation. Having difficulty falling asleep and staying asleep due to worrying. Has struggled intermittently for years with depression. Has been able to successfully lose weight. Has two small children at home. Attributes a lot of his mood to chronic neck pain with   sxs. Has difficultly lifting his children due to the discomfort. Being followed by PM&R for neck pain      Review of Systems   Constitutional: Negative for unexpected weight change. Respiratory: Negative for cough, chest tightness, shortness of breath and wheezing. Cardiovascular: Negative for chest pain, palpitations and leg swelling. Gastrointestinal: Negative for abdominal distention, constipation, diarrhea, nausea and vomiting. Musculoskeletal: Positive for neck pain and neck stiffness. Negative for arthralgias, back pain and myalgias. Neurological: Negative for tremors and numbness. Psychiatric/Behavioral: Positive for decreased concentration, dysphoric mood and sleep disturbance. Negative for agitation, behavioral problems, confusion, self-injury and suicidal ideas. The patient is nervous/anxious. All other systems reviewed and are negative.       Current Outpatient Medications   Medication Sig Dispense Refill    buPROPion (WELLBUTRIN) 75 MG tablet Take one tablet daily x 3 days; then increase to one tablet two times daily 57 tablet 3    losartan (COZAAR) 25 MG tablet Take 1 tablet by mouth daily 90 tablet 3    diclofenac (VOLTAREN) 50 MG EC tablet Take 1 tablet by mouth 2 times daily (with meals) (Patient taking differently: Take 50 mg by mouth as needed ) 60 tablet 3    cetirizine (ZYRTEC) 10 MG tablet Take 10 mg by mouth daily       lansoprazole (PREVACID) 15 MG capsule Take 15 mg by mouth daily        No current facility-administered medications for this visit. Physical Exam  Vitals signs reviewed. Constitutional:       General: He is not in acute distress. Appearance: He is well-developed. He is not diaphoretic. HENT:      Right Ear: External ear normal.      Left Ear: External ear normal.      Mouth/Throat:      Pharynx: No oropharyngeal exudate. Neck:      Musculoskeletal: Neck supple. Thyroid: No thyromegaly. Cardiovascular:      Rate and Rhythm: Normal rate and regular rhythm. Heart sounds: Normal heart sounds. No murmur. No friction rub. No gallop. Pulmonary:      Effort: Pulmonary effort is normal. No respiratory distress. Breath sounds: Normal breath sounds. No wheezing or rales. Abdominal:      Palpations: Abdomen is soft. Neurological:      Mental Status: He is alert and oriented to person, place, and time. Psychiatric:         Mood and Affect: Mood normal.         Behavior: Behavior normal.         Thought Content: Thought content normal.         Judgment: Judgment normal.         Vitals:    09/01/20 1416   BP: (!) 148/94   Pulse: 107   Temp: 97.3 °F (36.3 °C)   SpO2: 98%         ASSESSMENT/PLAN:  1. Reactive depression  Likely due to chronic pain and frustration with this. tx options discussed with pt. Discussed use, benefit, and side effects of prescribed medications. Barriers to compliance discussed. All patient questions answered. Pt voiced understanding. Call if no improvement or worsening symptoms  - buPROPion (WELLBUTRIN) 75 MG tablet; Take one tablet daily x 3 days; then increase to one tablet two times daily  Dispense: 57 tablet;  Refill: 3

## 2020-11-05 ENCOUNTER — OFFICE VISIT (OUTPATIENT)
Dept: ORTHOPEDIC SURGERY | Age: 40
End: 2020-11-05
Payer: COMMERCIAL

## 2020-11-05 VITALS — WEIGHT: 252 LBS | HEIGHT: 70 IN | BODY MASS INDEX: 36.08 KG/M2

## 2020-11-05 PROCEDURE — 99214 OFFICE O/P EST MOD 30 MIN: CPT | Performed by: ORTHOPAEDIC SURGERY

## 2020-11-05 PROCEDURE — G8417 CALC BMI ABV UP PARAM F/U: HCPCS | Performed by: ORTHOPAEDIC SURGERY

## 2020-11-05 PROCEDURE — G8427 DOCREV CUR MEDS BY ELIG CLIN: HCPCS | Performed by: ORTHOPAEDIC SURGERY

## 2020-11-05 PROCEDURE — G8484 FLU IMMUNIZE NO ADMIN: HCPCS | Performed by: ORTHOPAEDIC SURGERY

## 2020-11-05 PROCEDURE — 1036F TOBACCO NON-USER: CPT | Performed by: ORTHOPAEDIC SURGERY

## 2020-11-05 NOTE — PROGRESS NOTES
tablet daily x 3 days; then increase to one tablet two times daily, Disp: 57 tablet, Rfl: 3    losartan (COZAAR) 25 MG tablet, Take 1 tablet by mouth daily, Disp: 90 tablet, Rfl: 3    diclofenac (VOLTAREN) 50 MG EC tablet, Take 1 tablet by mouth 2 times daily (with meals) (Patient taking differently: Take 50 mg by mouth as needed ), Disp: 60 tablet, Rfl: 3    cetirizine (ZYRTEC) 10 MG tablet, Take 10 mg by mouth daily , Disp: , Rfl:     lansoprazole (PREVACID) 15 MG capsule, Take 15 mg by mouth daily , Disp: , Rfl:   Allergies:  Penicillins and Versed [midazolam]  Social History:    reports that he has never smoked. He has never used smokeless tobacco. He reports that he does not drink alcohol or use drugs. Family History:   Family History   Problem Relation Age of Onset    High Blood Pressure Mother     High Cholesterol Mother     High Blood Pressure Father     High Cholesterol Father     Diabetes Father     High Blood Pressure Brother     High Blood Pressure Maternal Grandmother     Diabetes Maternal Grandmother     High Blood Pressure Maternal Grandfather     Heart Disease Maternal Grandfather     High Blood Pressure Paternal Grandfather     Heart Disease Paternal Grandfather        REVIEW OF SYSTEMS: Full ROS noted & scanned   CONSTITUTIONAL: Denies unexplained weight loss, fevers, chills or fatigue  NEUROLOGICAL: Denies unsteady gait or progressive weakness  MUSCULOSKELETAL: Denies joint swelling or redness  PSYCHOLOGICAL: Denies anxiety, depression   SKIN: Denies skin changes, delayed healing, rash, itching   HEMATOLOGIC: Denies easy bleeding or bruising  ENDOCRINE: Denies excessive thirst, urination, heat/cold  RESPIRATORY: Denies current dyspnea, cough  GI: Denies nausea, vomiting, diarrhea   : Denies bowel or bladder issues       PHYSICAL EXAM:    Vitals: Height 5' 10\" (1.778 m), weight 252 lb (114.3 kg).     GENERAL EXAM:  · General Apparence: Patient is adequately groomed with no evidence of malnutrition. · Orientation: The patient is oriented to time, place and person. · Mood & Affect:The patient's mood and affect are appropriate   · Vascular: Examination reveals no swelling tenderness in upper or lower extremities. Good capillary refill  · Lymphatic: The lymphatic examination bilaterally reveals all areas to be without enlargement or induration  · Sensation: Sensation is intact without deficit  · Coordination/Balance: Good coordination     CERVICAL EXAMINATION:  · Inspection: Local inspection shows no step-off or bruising. Cervical alignment is normal.     · Palpation: No evidence of tenderness at the midline, and trapezius. Paraspinal tenderness is present. There is no step-off or paraspinal spasm. · Range of Motion: Cervical flexion, extension, and rotation are mildly reduced with pain. · Strength: 5/5 bilateral upper extremities   · Special Tests:    ·  Alvarado's negative bilaterally. ·      Cubital and Carpal tunnel Tinel's negative bilaterally. · Skin:There are no rashes, ulcerations or lesions in right & left upper extremities. · Reflexes: Bilaterally triceps, biceps and brachioradialis are 2+. Clonus absent bilaterally at the feet. · Gait & station: normal, patient ambulates without assistance     · Additional Examinations:       · RIGHT UPPER EXTREMITY:  Inspection/examination of the right upper extremity does not show any tenderness, deformity or injury. Range of motion is unremarkable. There is no gross instability. There are no rashes, ulcerations or lesions. Strength and tone are normal.  · LEFT UPPER EXTREMITY: Inspection/examination of the left upper extremity does not show any tenderness, deformity or injury. Range of motion is unremarkable. There is no gross instability. There are no rashes, ulcerations or lesions.  Strength and tone are normal.    Diagnostic Testing:  Reviewed MRI images of his cervical and thoracic spine from September 2020 which shows a right paracentral disc protrusion C5-C6 without cord or nerve root compression    Impression:   Cervical HNP with radiculitis    Plan:    Discussed treatment options including observation, additional physical therapy, medications, epidural injections and cervical surgery. I recommended a home exercise program including home cervical traction.

## 2020-11-17 ENCOUNTER — TELEPHONE (OUTPATIENT)
Dept: INTERNAL MEDICINE CLINIC | Age: 40
End: 2020-11-17

## 2020-11-17 RX ORDER — PREDNISONE 20 MG/1
20 TABLET ORAL 2 TIMES DAILY
Qty: 10 TABLET | Refills: 0 | Status: SHIPPED | OUTPATIENT
Start: 2020-11-17 | End: 2020-11-22

## 2020-11-17 NOTE — TELEPHONE ENCOUNTER
Patient wife called stating that Belinda Davila was diagnosed with COVID on Friday afternoon he was in the ER and they also diagnosed him with pneumonia he was given cefdinir erythromycin and an albuterol inhaler. He has not had any improvement and is still in a lot of pain in his pelvis and ribs that comes and goes. He dont know if this is his lungs hurting or something else. She said he has been trying to get up and move around but even sitting up in bed makes him super tired and short of breath. She isnt sure if he should be given and steroid to help him? If so please send to [facility] in [facility].  If not she would like to know at what point he should go back to the ER?

## 2020-11-28 ENCOUNTER — HOSPITAL ENCOUNTER (EMERGENCY)
Age: 40
Discharge: HOME | End: 2020-11-28
Payer: COMMERCIAL

## 2020-11-28 ENCOUNTER — APPOINTMENT (OUTPATIENT)
Dept: CT IMAGING | Age: 40
End: 2020-11-28
Payer: COMMERCIAL

## 2020-11-28 ENCOUNTER — APPOINTMENT (OUTPATIENT)
Dept: GENERAL RADIOLOGY | Age: 40
End: 2020-11-28
Payer: COMMERCIAL

## 2020-11-28 VITALS
RESPIRATION RATE: 16 BRPM | OXYGEN SATURATION: 99 % | HEART RATE: 81 BPM | TEMPERATURE: 98.5 F | BODY MASS INDEX: 35.87 KG/M2 | WEIGHT: 250 LBS | DIASTOLIC BLOOD PRESSURE: 108 MMHG | SYSTOLIC BLOOD PRESSURE: 148 MMHG

## 2020-11-28 LAB
A/G RATIO: 1.3 (ref 1.1–2.2)
ALBUMIN SERPL-MCNC: 3.9 G/DL (ref 3.4–5)
ALP BLD-CCNC: 51 U/L (ref 40–129)
ALT SERPL-CCNC: <5 U/L (ref 10–40)
ANION GAP SERPL CALCULATED.3IONS-SCNC: 6 MMOL/L (ref 3–16)
AST SERPL-CCNC: 53 U/L (ref 15–37)
BASOPHILS ABSOLUTE: 0 K/UL (ref 0–0.2)
BASOPHILS RELATIVE PERCENT: 0.8 %
BILIRUB SERPL-MCNC: <0.2 MG/DL (ref 0–1)
BUN BLDV-MCNC: 14 MG/DL (ref 7–20)
CALCIUM SERPL-MCNC: 8.8 MG/DL (ref 8.3–10.6)
CHLORIDE BLD-SCNC: 107 MMOL/L (ref 99–110)
CO2: 26 MMOL/L (ref 21–32)
CREAT SERPL-MCNC: 0.9 MG/DL (ref 0.9–1.3)
D DIMER: 270 NG/ML DDU (ref 0–229)
EOSINOPHILS ABSOLUTE: 0.2 K/UL (ref 0–0.6)
EOSINOPHILS RELATIVE PERCENT: 2.9 %
GFR AFRICAN AMERICAN: >60
GFR NON-AFRICAN AMERICAN: >60
GLOBULIN: 3 G/DL
GLUCOSE BLD-MCNC: 104 MG/DL (ref 70–99)
HCT VFR BLD CALC: 40.4 % (ref 40.5–52.5)
HEMOGLOBIN: 13.3 G/DL (ref 13.5–17.5)
LYMPHOCYTES ABSOLUTE: 1.8 K/UL (ref 1–5.1)
LYMPHOCYTES RELATIVE PERCENT: 30.6 %
MCH RBC QN AUTO: 27 PG (ref 26–34)
MCHC RBC AUTO-ENTMCNC: 33 G/DL (ref 31–36)
MCV RBC AUTO: 81.9 FL (ref 80–100)
MONOCYTES ABSOLUTE: 0.5 K/UL (ref 0–1.3)
MONOCYTES RELATIVE PERCENT: 8.4 %
NEUTROPHILS ABSOLUTE: 3.4 K/UL (ref 1.7–7.7)
NEUTROPHILS RELATIVE PERCENT: 57.3 %
PDW BLD-RTO: 14.7 % (ref 12.4–15.4)
PLATELET # BLD: 265 K/UL (ref 135–450)
PMV BLD AUTO: 8.8 FL (ref 5–10.5)
POTASSIUM REFLEX MAGNESIUM: 5.8 MMOL/L (ref 3.5–5.1)
POTASSIUM SERPL-SCNC: 5.3 MMOL/L (ref 3.5–5.1)
RBC # BLD: 4.93 M/UL (ref 4.2–5.9)
SODIUM BLD-SCNC: 139 MMOL/L (ref 136–145)
TOTAL PROTEIN: 6.9 G/DL (ref 6.4–8.2)
TROPONIN: <0.01 NG/ML
TROPONIN: <0.01 NG/ML
WBC # BLD: 6 K/UL (ref 4–11)

## 2020-11-28 PROCEDURE — 99285 EMERGENCY DEPT VISIT HI MDM: CPT

## 2020-11-28 PROCEDURE — 71260 CT THORAX DX C+: CPT

## 2020-11-28 PROCEDURE — 85379 FIBRIN DEGRADATION QUANT: CPT

## 2020-11-28 PROCEDURE — 84484 ASSAY OF TROPONIN QUANT: CPT

## 2020-11-28 PROCEDURE — 93005 ELECTROCARDIOGRAM TRACING: CPT | Performed by: EMERGENCY MEDICINE

## 2020-11-28 PROCEDURE — 71045 X-RAY EXAM CHEST 1 VIEW: CPT

## 2020-11-28 PROCEDURE — 84132 ASSAY OF SERUM POTASSIUM: CPT

## 2020-11-28 PROCEDURE — 85025 COMPLETE CBC W/AUTO DIFF WBC: CPT

## 2020-11-28 PROCEDURE — 36415 COLL VENOUS BLD VENIPUNCTURE: CPT

## 2020-11-28 PROCEDURE — 80053 COMPREHEN METABOLIC PANEL: CPT

## 2020-11-28 PROCEDURE — 6360000004 HC RX CONTRAST MEDICATION: Performed by: PHYSICIAN ASSISTANT

## 2020-11-28 RX ADMIN — IOPAMIDOL 75 ML: 755 INJECTION, SOLUTION INTRAVENOUS at 20:40

## 2020-11-28 ASSESSMENT — PAIN SCALES - GENERAL: PAINLEVEL_OUTOF10: 2

## 2020-11-28 ASSESSMENT — ENCOUNTER SYMPTOMS
VOMITING: 0
BACK PAIN: 0
ABDOMINAL PAIN: 0
EYES NEGATIVE: 1
SHORTNESS OF BREATH: 1
COLOR CHANGE: 0
COUGH: 0

## 2020-11-28 ASSESSMENT — HEART SCORE: ECG: 1

## 2020-11-29 LAB
EKG ATRIAL RATE: 92 BPM
EKG DIAGNOSIS: NORMAL
EKG P AXIS: 52 DEGREES
EKG P-R INTERVAL: 152 MS
EKG Q-T INTERVAL: 402 MS
EKG QRS DURATION: 148 MS
EKG QTC CALCULATION (BAZETT): 497 MS
EKG R AXIS: 1 DEGREES
EKG T AXIS: 24 DEGREES
EKG VENTRICULAR RATE: 92 BPM

## 2020-11-29 PROCEDURE — 93010 ELECTROCARDIOGRAM REPORT: CPT | Performed by: INTERNAL MEDICINE

## 2020-11-29 NOTE — ED PROVIDER NOTES
I was not asked to see this patient. I was available for consultation if deemed necessary. I was only asked to interpret the EKG. Please see CHACORTA Zeng's note for care of the patient while in the emergency department and for final disposition. The Ekg interpreted by me shows  normal sinus rhythm with a rate of 92  Axis is   Normal  QTc is  within an acceptable range  Intervals and Durations are unremarkable.       ST Segments: no acute change and nonspecific changes  No significant change from prior EKG dated - 11/16/12  No STEMI            Enrique Chowdhury MD  11/28/20 8255

## 2020-11-29 NOTE — ED PROVIDER NOTES
[address] [facility] [place]  ED  EMERGENCY DEPARTMENT ENCOUNTER        Pt Name: Higinoi Zeng  MRN: [id number]  [place] 1980  Date of evaluation: 11/28/2020  Provider: Buddy Vernon PA-C  PCP: Rosetta Padilla MD  ED Attending: Colletta Ridge, MD      This patient was not seen by the attending provider     History provided by the patient    CHIEF COMPLAINT:     Chief Complaint   Patient presents with    Chest Pain     Pt is COVID + and started having chest pain today, took 4 baby asa at home       HISTORY OF PRESENT ILLNESS:      Higinio Zeng is a 38 y.o. male who arrives to the ED by private vehicle. Patient is here reporting left anterior and lateral chest pain that started at 11:30 AM.  Onset occurred while he was cleaning up his children's toys. He has had some shortness of breath with it and pain is for the most part been constant all day. Patient reports being diagnosed with COVID-19 on 11/12. However says he has not had Covid symptoms in about 4 days. He took 4 baby aspirin at home. He used his inhaler which he had been prescribed for Covid. He even used his wife's nebulizer. However he arrives to the ED concerned about his chest pain and also concerned with the possibility for PE. Patient is obese but reports having lost 50 pounds in the last year (intentionally). He has hypertension. He is a non-smoker and denies diabetes or hyperlipidemia. He does not have a significant family history of CAD in terms of people having cardiac issues under the age of 48. He cannot identify exacerbating or alleviating factors to his symptoms. Nursing Notes were reviewed     REVIEW OF SYSTEMS:     Review of Systems   Constitutional: Negative for chills and fever. HENT: Negative. Eyes: Negative. Respiratory: Positive for shortness of breath. Negative for cough. Cardiovascular: Positive for chest pain. Gastrointestinal: Negative for abdominal pain and vomiting. Genitourinary: Negative. Musculoskeletal: Negative for back pain and neck pain. Skin: Negative for color change. Neurological: Negative for dizziness and headaches. All other systems reviewed and are negative. Except as noted above in the ROS, all other systems were reviewed and negative.          PAST MEDICAL HISTORY:     Past Medical History:   Diagnosis Date    GERD (gastroesophageal reflux disease)     Hypertension          SURGICAL HISTORY:      Past Surgical History:   Procedure Laterality Date    CHOLECYSTECTOMY      EYE SURGERY Left 1/2014    TPK    EYE SURGERY Right 3/2014    TPK    UPPER GASTROINTESTINAL ENDOSCOPY  7/9/2012    WISDOM TOOTH EXTRACTION           CURRENT MEDICATIONS:       Previous Medications    BUPROPION (WELLBUTRIN) 75 MG TABLET    Take one tablet daily x 3 days; then increase to one tablet two times daily    CETIRIZINE (ZYRTEC) 10 MG TABLET    Take 10 mg by mouth daily     DICLOFENAC (VOLTAREN) 50 MG EC TABLET    Take 1 tablet by mouth 2 times daily (with meals)    LANSOPRAZOLE (PREVACID) 15 MG CAPSULE    Take 15 mg by mouth daily     LOSARTAN (COZAAR) 25 MG TABLET    Take 1 tablet by mouth daily         ALLERGIES:    Penicillins and Versed [midazolam]    FAMILY HISTORY:       Family History   Problem Relation Age of Onset    High Blood Pressure Mother     High Cholesterol Mother     High Blood Pressure Father     High Cholesterol Father     Diabetes Father     High Blood Pressure Brother     High Blood Pressure Maternal Grandmother     Diabetes Maternal Grandmother     High Blood Pressure Maternal Grandfather     Heart Disease Maternal Grandfather     High Blood Pressure Paternal Grandfather     Heart Disease Paternal Grandfather           SOCIAL HISTORY:       Social History     Socioeconomic History    Marital status:      Spouse name: None    Number of children: None    Years of education: None    Highest education level: None   Occupational History    None   Social Needs    Financial resource strain: None    Food insecurity     Worry: None     Inability: None    Transportation needs     Medical: None     Non-medical: None   Tobacco Use    Smoking status: Never Smoker    Smokeless tobacco: Never Used   Substance and Sexual Activity    Alcohol use: No    Drug use: No    Sexual activity: Yes     Partners: Female   Lifestyle    Physical activity     Days per week: None     Minutes per session: None    Stress: None   Relationships    Social connections     Talks on phone: None     Gets together: None     Attends [employer] service: None     Active member of club or organization: None     Attends meetings of clubs or organizations: None     Relationship status: None    Intimate partner violence     Fear of current or ex partner: None     Emotionally abused: None     Physically abused: None     Forced sexual activity: None   Other Topics Concern    None   Social History Narrative    None       SCREENINGS:    Acosta Coma Scale  Eye Opening: Spontaneous  Best Verbal Response: Oriented  Best Motor Response: Obeys commands  Acosta Coma Scale Score: 15 Heart Score for chest pain patients  History: Slightly Suspicious  ECG: Non-Specifc repolarization disturbance/LBTB/PM  Patient Age: < 45 years  *Risk factors for Atherosclerotic disease: Hypertension, Obesity  Risk Factors: 1 or 2 risk factors  Troponin: < 1X normal limit  Heart Score Total: 2      PHYSICAL EXAM:       ED Triage Vitals   BP Temp Temp Source Pulse Resp SpO2 Height Weight   11/28/20 1541 11/28/20 1541 11/28/20 1541 11/28/20 1504 11/28/20 1541 11/28/20 1504 -- 11/28/20 1541   (!) 141/93 98.5 °F (36.9 °C) Oral 106 20 97 %  250 lb (113.4 kg)       Physical Exam    CONSTITUTIONAL: Awake and alert. Cooperative. Well-developed. Well-nourished. Non-toxic. No acute distress. HENT: Normocephalic. Atraumatic. External ears normal, without discharge. No nasal discharge. Oropharynx clear.  Mucous membranes moist.  EYES: Conjunctiva non-injected. No scleral icterus. PERRL. EOM's grossly intact. NECK: Supple. Normal ROM. CARDIOVASCULAR: RRR. No Murmer. Intact distal pulses. PULMONARY/CHEST WALL: Effort normal. No tachypnea. Lungs clear to ausculation. ABDOMEN: Normal BS. Soft. Nondistended. No tenderness to palpate. No guarding. /ANORECTAL: Not assessed  MUSKULOSKELETAL: Normal ROM. No acute deformities. No edema. No tenderness to palpate. SKIN: Warm and dry. No rash. NEUROLOGICAL: Alert and oriented x 3. GCS 15. CN II-XII grossly intact. Strength is 5/5 in all extremities and sensation is intact. Normal gait.    PSYCHIATRIC: Normal affect        DIAGNOSTICRESULTS:     LABS:    Results for orders placed or performed during the hospital encounter of 11/28/20   CBC Auto Differential   Result Value Ref Range    WBC 6.0 4.0 - 11.0 K/uL    RBC 4.93 4.20 - 5.90 M/uL    Hemoglobin 13.3 (L) 13.5 - 17.5 g/dL    Hematocrit 40.4 (L) 40.5 - 52.5 %    MCV 81.9 80.0 - 100.0 fL    MCH 27.0 26.0 - 34.0 pg    MCHC 33.0 31.0 - 36.0 g/dL    RDW 14.7 12.4 - 15.4 %    Platelets 603 472 - 943 K/uL    MPV 8.8 5.0 - 10.5 fL    Neutrophils % 57.3 %    Lymphocytes % 30.6 %    Monocytes % 8.4 %    Eosinophils % 2.9 %    Basophils % 0.8 %    Neutrophils Absolute 3.4 1.7 - 7.7 K/uL    Lymphocytes Absolute 1.8 1.0 - 5.1 K/uL    Monocytes Absolute 0.5 0.0 - 1.3 K/uL    Eosinophils Absolute 0.2 0.0 - 0.6 K/uL    Basophils Absolute 0.0 0.0 - 0.2 K/uL   Comprehensive Metabolic Panel w/ Reflex to MG   Result Value Ref Range    Sodium 139 136 - 145 mmol/L    Potassium reflex Magnesium 5.8 (H) 3.5 - 5.1 mmol/L    Chloride 107 99 - 110 mmol/L    CO2 26 21 - 32 mmol/L    Anion Gap 6 3 - 16    Glucose 104 (H) 70 - 99 mg/dL    BUN 14 7 - 20 mg/dL    CREATININE 0.9 0.9 - 1.3 mg/dL    GFR Non-African American >60 >60    GFR African American >60 >60    Calcium 8.8 8.3 - 10.6 mg/dL    Total Protein 6.9 6.4 - 8.2 g/dL    Alb 3.9 3.4 - 5.0 g/dL    Albumin/Globulin Ratio 1.3 1.1 - 2.2    Total Bilirubin <0.2 0.0 - 1.0 mg/dL    Alkaline Phosphatase 51 40 - 129 U/L    ALT <5 (L) 10 - 40 U/L    AST 53 (H) 15 - 37 U/L    Globulin 3.0 g/dL   Troponin   Result Value Ref Range    Troponin <0.01 <0.01 ng/mL   D-dimer, quantitative   Result Value Ref Range    D-Dimer, Quant 270 (H) 0 - 229 ng/mL DDU   Potassium   Result Value Ref Range    Potassium 5.3 (H) 3.5 - 5.1 mmol/L   Troponin   Result Value Ref Range    Troponin <0.01 <0.01 ng/mL         RADIOLOGY:  All x-ray studies areviewed/reviewed by me. Formal interpretations per the radiologist are as follows:    Xr Chest Portable    Result Date: 11/28/2020  EXAMINATION: ONE XRAY VIEW OF THE CHEST 11/28/2020 5:59 pm COMPARISON: 02/26/2012. HISTORY: ORDERING SYSTEM PROVIDED HISTORY: chest pain TECHNOLOGIST PROVIDED HISTORY: Reason for exam:->chest pain Reason for Exam: Chest Pain (Pt is COVID + and started having chest pain today, took 4 baby asa at home) FINDINGS: The lung volumes are low. The cardiac silhouette appears magnified, likely due to technique. Scarring/atelectatic changes are seen at the left lung base without confluent airspace opacity, large pleural effusion or pneumothorax seen. .     Scarring/atelectatic changes seen at the left lung base without confluent airspace opacity or pleural effusion seen. Magnified cardiac silhouette, which may be due to technique and low lung volumes. Ct Chest Pulmonary Embolism W Contrast    Result Date: 11/28/2020  EXAMINATION: CTA OF THE CHEST 11/28/2020 8:25 pm TECHNIQUE: CTA of the chest was performed after the administration of intravenous contrast.  Multiplanar reformatted images are provided for review. MIP images are provided for review. Dose modulation, iterative reconstruction, and/or weight based adjustment of the mA/kV was utilized to reduce the radiation dose to as low as reasonably achievable.  COMPARISON: CT chest dated 03/06/2012 HISTORY: ORDERING SYSTEM PROVIDED HISTORY: CP, SOB, recent COVID infection, elevated ddimer TECHNOLOGIST PROVIDED HISTORY: Reason for exam:->CP, SOB, recent COVID infection, elevated ddimer Reason for Exam: left sided chest pain, sob for about a day Acuity: Acute Type of Exam: Initial Relevant Medical/Surgical History: diagnosed with covid 2 weeks ago FINDINGS: Pulmonary Arteries: Within the main, central most right, central most left pulmonary arteries, no evidence of filling defect to suggest large central pulmonary embolism. Within the opacified branch vessels, no gross embolism is seen. Mediastinum: Within the thoracic aorta, pulsation artifact is seen proximally. There is otherwise no gross intraluminal flap. Trace pericardial effusion. Shotty noncalcified hilar lymph nodes. Thyroid is symmetric in size. No axillary adenopathy. Lungs/pleura: Patchy heterogeneous ground-glass and nodular opacity is demonstrated bilaterally throughout all lobes no pneumothorax. No pleural effusion. Upper Abdomen: Status post cholecystectomy. Spleen is upper limits of normal in size, approximately 14 cm in transverse dimension. Soft Tissues/Bones: Degenerative change of the spine. Patchy multifocal ground-glass and nodular opacity bilaterally, compatible with viral pneumonitis given patient history of COVID-19 infection. No gross findings of pulmonary embolism. Trace pericardial effusion. EKG: The Ekg interpreted by me in the absence of a cardiologist shows.     normal sinus rhythm with a rate of 92 with RBBB      See also interpretation by Chepe Smallwood MD      PROCEDURES:   N/A    CRITICAL CARE TIME:       None    CONSULTS:  None      EMERGENCY DEPARTMENT COURSE and DIFFERENTIAL DIAGNOSIS/MDM:   Vitals:    Vitals:    11/28/20 1541 11/28/20 1837 11/28/20 1921 11/28/20 2004   BP: (!) 141/93  (!) 148/108    Pulse: 104 81 77 81   Resp: 20 16     Temp: 98.5 °F (36.9 °C)      TempSrc: Oral      SpO2: 94% 97% 99% 99%   Weight: 250 lb (113.4 kg)          Patient was given the following medications:  Medications   iopamidol (ISOVUE-370) 76 % injection 75 mL (75 mLs Intravenous Given 11/28/20 2040)         I have evaluated this patient in the ED. Old records were reviewed. This patient arrives to the emergency describing chest pain since 11:30 AM.  Its been constant. He has had some shortness of breath but no other symptoms. He is getting over COVID-19 infection. He has risk factors for ACS that include hypertension and obesity. He is never had a stress test or cardiac work-up but arrives to the ED chiefly worried about possible PE. He wants a D-dimer and/or CT chest ordered on him. EKG shows right bundle branch block with rate 92 bpm.  This is a chronic finding on his EKGs. CBC with normal white count of 6.0, H&H 13.3 and 40.4  CMP is unremarkable. Initial potassium was hemolyzed and this is redrawn and much improved. A troponin is negative x2 draws  D-dimer is slightly elevated at 270. His initial chest x-ray shows scarring/atelectatic changes seen in the left lung base without confluent airspace opacity or pleural effusion. Magnified cardiac silhouette, which may be due to technique and low lung volumes  CT chest due to elevated D-dimer shows patchy multifocal groundglass and nodular opacity bilaterally, compatible with viral pneumonitis given patient history of COVID-19 infection. No gross findings of pulmonary embolism. Trace pericardial effusion. I made patient aware of findings. I do believe he is stable for discharge. He has a heart score of 2. He been monitored in the ED for over 6 hours. He has primary care follow-up and will additionally be given cardiology follow-up. I estimate there is LOW risk for BACTERIAL PNEUMONIA, PNEUMOTHORAX, PULMONARY EMBOLISM, ACUTE CORONARY SYNDROME, OR THORACIC AORTIC DISSECTION, thus I consider the discharge disposition reasonable.  Jayy Lorenzo III and I have discussed the diagnosis and risks, and we agree with discharging home to follow-up with their primary doctor. We also discussed returning to the Emergency Department immediately if new or worsening symptoms occur. We have discussed the symptoms which are most concerning (e.g., bloody sputum, fever, worsening pain or shortness of breath, vomiting) that necessitate immediate return. FINAL IMPRESSION:      1. Chest pain, unspecified type    2. COVID-19 virus infection    3. Pericardial effusion    4.  Pneumonitis          DISPOSITION/PLAN:   DISPOSITION Decision To Discharge      PATIENT REFERRED TO:  Melisa Summers MD  [address] [facility] [address]  [place] [id number] [phone]    Schedule an appointment as soon as possible for a visit       Skyla Katz MD  [facility] [address]  [phone]    Schedule an appointment as soon as possible for a visit         DISCHARGE MEDICATIONS:  New Prescriptions    No medications on file                  (Please note thatportions of this note were completed with a voice recognition program.  Efforts were made to edit the dictations, but occasionally words are mis-transcribed.)    Margarita Dang PA-C (electronicallysigned)              Deangelo Royma  11/28/20 5537

## 2021-01-07 DIAGNOSIS — I10 ESSENTIAL HYPERTENSION: ICD-10-CM

## 2021-01-07 RX ORDER — LOSARTAN POTASSIUM 25 MG/1
TABLET ORAL
Qty: 90 TABLET | Refills: 0 | Status: SHIPPED | OUTPATIENT
Start: 2021-01-07 | End: 2021-04-13 | Stop reason: SDUPTHER

## 2021-01-07 NOTE — TELEPHONE ENCOUNTER
Refill request for losartan medication.      Name of Ck Mercado      Last visit - 9-1-20     Pending visit - none    Last refill -11-14-20      Medication Contract signed -   Alfredo benz-         Additional Comments

## 2021-04-13 DIAGNOSIS — I10 ESSENTIAL HYPERTENSION: ICD-10-CM

## 2021-04-13 RX ORDER — LOSARTAN POTASSIUM 25 MG/1
25 TABLET ORAL DAILY
Qty: 90 TABLET | Refills: 0 | Status: SHIPPED | OUTPATIENT
Start: 2021-04-13 | End: 2021-04-27 | Stop reason: SDUPTHER

## 2021-04-13 NOTE — TELEPHONE ENCOUNTER
Refill request for losartan medication.      Name of Ck Mercado      Last visit - 9-1-20     Pending visit - none    Last refill -1-7-21      Medication Contract signed -   Last Jojo benz-         Additional Comments

## 2021-04-13 NOTE — TELEPHONE ENCOUNTER
Called patient to schedule appointment per Dr. Polly Claros.   Patient driving, will call back to schedule physical.

## 2021-04-27 ENCOUNTER — VIRTUAL VISIT (OUTPATIENT)
Dept: INTERNAL MEDICINE CLINIC | Age: 41
End: 2021-04-27
Payer: COMMERCIAL

## 2021-04-27 DIAGNOSIS — F32.9 REACTIVE DEPRESSION: ICD-10-CM

## 2021-04-27 DIAGNOSIS — E78.5 HYPERLIPIDEMIA, UNSPECIFIED HYPERLIPIDEMIA TYPE: Primary | ICD-10-CM

## 2021-04-27 DIAGNOSIS — I10 ESSENTIAL HYPERTENSION: ICD-10-CM

## 2021-04-27 PROCEDURE — 99213 OFFICE O/P EST LOW 20 MIN: CPT | Performed by: INTERNAL MEDICINE

## 2021-04-27 PROCEDURE — G8427 DOCREV CUR MEDS BY ELIG CLIN: HCPCS | Performed by: INTERNAL MEDICINE

## 2021-04-27 RX ORDER — LOSARTAN POTASSIUM 25 MG/1
25 TABLET ORAL DAILY
Qty: 90 TABLET | Refills: 3 | Status: SHIPPED | OUTPATIENT
Start: 2021-04-27 | End: 2021-10-25

## 2021-04-27 RX ORDER — ASPIRIN 81 MG/1
81 TABLET ORAL DAILY
COMMUNITY
Start: 2020-12-04

## 2021-04-27 RX ORDER — ATORVASTATIN CALCIUM 40 MG/1
40 TABLET, FILM COATED ORAL DAILY
COMMUNITY
Start: 2020-12-18 | End: 2022-11-04 | Stop reason: SDUPTHER

## 2021-04-27 RX ORDER — BUPROPION HYDROCHLORIDE 75 MG/1
TABLET ORAL
Qty: 60 TABLET | Refills: 5 | Status: SHIPPED | OUTPATIENT
Start: 2021-04-27 | End: 2021-10-25 | Stop reason: SDUPTHER

## 2021-04-27 RX ORDER — VITAMIN B COMPLEX
1000 TABLET ORAL DAILY
COMMUNITY
End: 2021-07-27

## 2021-04-27 ASSESSMENT — ENCOUNTER SYMPTOMS
CONSTIPATION: 0
VOMITING: 0
BACK PAIN: 0
DIARRHEA: 0
COUGH: 0
ABDOMINAL DISTENTION: 0
NAUSEA: 0
CHEST TIGHTNESS: 0
SHORTNESS OF BREATH: 0
WHEEZING: 0

## 2021-04-27 NOTE — PROGRESS NOTES
2021    TELEHEALTH EVALUATION -- Audio/Visual (During EOZKG-87 public health emergency)    HPI:    Dima Coronel (:  1980) has requested an audio/video evaluation for the following concern(s):    Follow-up hypertension, hyperlipidemia and depression. Patient feels well. Hypertension has improved on losartan. Has not been watching his sodium intake as he lost most of his smell and taste when he had Covid and can now taste sodium and sleep. Weight is back up. States home blood pressure readings are running in the 130s over 80s. Denies chest pain, shortness of breath, PND orthopnea    Hyperlipidemia; started on atorvastatin 40 mg by cardiology. Plans to get blood work later this week. Depression. Symptoms have improved on Wellbutrin 75 mg. Does struggle at times with his mood but feels overall he is doing much better. Denies suicidal ideation    Review of Systems   Constitutional: Negative for unexpected weight change. Respiratory: Negative for cough, chest tightness, shortness of breath and wheezing. Cardiovascular: Negative for chest pain, palpitations and leg swelling. Gastrointestinal: Negative for abdominal distention, constipation, diarrhea, nausea and vomiting. Musculoskeletal: Negative for arthralgias, back pain and myalgias. Neurological: Negative for tremors and numbness. Psychiatric/Behavioral: Negative for dysphoric mood, self-injury, sleep disturbance and suicidal ideas. All other systems reviewed and are negative. Prior to Visit Medications    Medication Sig Taking?  Authorizing Provider   aspirin 81 MG EC tablet Take 81 mg by mouth daily Yes Historical Provider, MD   atorvastatin (LIPITOR) 40 MG tablet Take 40 mg by mouth daily Yes Historical Provider, MD   Vitamin D (CHOLECALCIFEROL) 25 MCG (1000 UT) TABS tablet Take 1,000 Units by mouth daily Yes Historical Provider, MD   losartan (COZAAR) 25 MG tablet Take 1 tablet by mouth daily Yes Claudetta Lauber, MD   buPROPion (WELLBUTRIN) 75 MG tablet TAKE 1 TABLET BY MOUTH DAILY FOR THREE DAYS, THEN INCREASE TO 1 TABLET TWO TIMES A DAY Yes Mona Hurtado MD   cetirizine (ZYRTEC) 10 MG tablet Take 10 mg by mouth daily  Yes Historical Provider, MD   lansoprazole (PREVACID) 15 MG capsule Take 15 mg by mouth daily  Yes Historical Provider, MD   diclofenac (VOLTAREN) 50 MG EC tablet Take 1 tablet by mouth 2 times daily (with meals)  Patient not taking: Reported on 4/27/2021  Ramsey Manning MD       Social History     Tobacco Use    Smoking status: Never Smoker    Smokeless tobacco: Never Used   Substance Use Topics    Alcohol use: No    Drug use: No        PHYSICAL EXAMINATION:  [ INSTRUCTIONS:  \"[x]\" Indicates a positive item  \"[]\" Indicates a negative item  -- DELETE ALL ITEMS NOT EXAMINED]  Vital Signs: (As obtained by patient/caregiver or practitioner observation)    Blood pressure- 130s/80s per pt Heart rate-    Respiratory rate-    Temperature-  Pulse oximetry-     Constitutional: [x] Appears well-developed and well-nourished [x] No apparent distress      [] Abnormal-   Mental status  [x] Alert and awake  [x] Oriented to person/place/time [x]Able to follow commands      Eyes:  EOM    [x]  Normal  [] Abnormal-  Sclera  [x]  Normal  [] Abnormal -         Discharge [x]  None visible  [] Abnormal -    HENT:   [x] Normocephalic, atraumatic.   [] Abnormal   [x] Mouth/Throat: Mucous membranes are moist.     External Ears [x] Normal  [] Abnormal-     Neck: [x] No visualized mass     Pulmonary/Chest: [x] Respiratory effort normal.  [x] No visualized signs of difficulty breathing or respiratory distress        [] Abnormal-      Musculoskeletal:   [] Normal gait with no signs of ataxia         [x] Normal range of motion of neck        [] Abnormal-       Neurological:        [x] No Facial Asymmetry (Cranial nerve 7 motor function) (limited exam to video visit)          [x] No gaze palsy        [] Abnormal-         Skin: [x] No significant exanthematous lesions or discoloration noted on facial skin         [] Abnormal-            Psychiatric:       [x] Normal Affect [x] No Hallucinations        [] Abnormal-     Other pertinent observable physical exam findings-     ASSESSMENT/PLAN:  1. Essential hypertension  Stable continue to monitor. Continue to watch sodium intake. - losartan (COZAAR) 25 MG tablet; Take 1 tablet by mouth daily  Dispense: 90 tablet; Refill: 3    2. Reactive depression  Improved. Continue current regimen  - buPROPion (WELLBUTRIN) 75 MG tablet; TAKE 1 TABLET BY MOUTH DAILY FOR THREE DAYS, THEN INCREASE TO 1 TABLET TWO TIMES A DAY  Dispense: 60 tablet; Refill: 5    3. Hyperlipidemia, unspecified hyperlipidemia type  Followed by cardiology. Patient will obtain lab work later this week. Sheri Kunz, was evaluated through a synchronous (real-time) audio-video encounter. The patient (or guardian if applicable) is aware that this is a billable service. Verbal consent to proceed has been obtained within the past 12 months. The visit was conducted pursuant to the emergency declaration under the [address] [place] [address], [address] [place] [address] authority and the [employer] and [employer] Act. Patient identification was verified, and a caregiver was present when appropriate. The patient was located in a state where the provider was credentialed to provide care. Total time spent on this encounter: Not billed by time    --[facility], MD on 4/27/2021 at 8:31 AM    An electronic signature was used to authenticate this note.

## 2021-06-02 ENCOUNTER — OFFICE VISIT (OUTPATIENT)
Dept: INTERNAL MEDICINE CLINIC | Age: 41
End: 2021-06-02
Payer: COMMERCIAL

## 2021-06-02 VITALS
OXYGEN SATURATION: 98 % | SYSTOLIC BLOOD PRESSURE: 128 MMHG | WEIGHT: 280 LBS | HEART RATE: 80 BPM | BODY MASS INDEX: 40.18 KG/M2 | DIASTOLIC BLOOD PRESSURE: 84 MMHG | TEMPERATURE: 97.3 F

## 2021-06-02 DIAGNOSIS — M54.50 ACUTE LEFT-SIDED LOW BACK PAIN WITHOUT SCIATICA: Primary | ICD-10-CM

## 2021-06-02 PROCEDURE — G8417 CALC BMI ABV UP PARAM F/U: HCPCS | Performed by: NURSE PRACTITIONER

## 2021-06-02 PROCEDURE — 99213 OFFICE O/P EST LOW 20 MIN: CPT | Performed by: NURSE PRACTITIONER

## 2021-06-02 PROCEDURE — G8427 DOCREV CUR MEDS BY ELIG CLIN: HCPCS | Performed by: NURSE PRACTITIONER

## 2021-06-02 PROCEDURE — 1036F TOBACCO NON-USER: CPT | Performed by: NURSE PRACTITIONER

## 2021-06-02 RX ORDER — NABUMETONE 750 MG/1
750 TABLET, FILM COATED ORAL 2 TIMES DAILY
Qty: 60 TABLET | Refills: 0 | Status: SHIPPED | OUTPATIENT
Start: 2021-06-02 | End: 2021-07-27 | Stop reason: SDUPTHER

## 2021-06-02 SDOH — ECONOMIC STABILITY: FOOD INSECURITY: WITHIN THE PAST 12 MONTHS, THE FOOD YOU BOUGHT JUST DIDN'T LAST AND YOU DIDN'T HAVE MONEY TO GET MORE.: NEVER TRUE

## 2021-06-02 SDOH — ECONOMIC STABILITY: FOOD INSECURITY: WITHIN THE PAST 12 MONTHS, YOU WORRIED THAT YOUR FOOD WOULD RUN OUT BEFORE YOU GOT MONEY TO BUY MORE.: NEVER TRUE

## 2021-06-02 ASSESSMENT — ENCOUNTER SYMPTOMS
EYE DISCHARGE: 0
WHEEZING: 0
SHORTNESS OF BREATH: 0
BLOOD IN STOOL: 0
DIARRHEA: 0
COUGH: 0
CONSTIPATION: 0
VOMITING: 0
NAUSEA: 0
ABDOMINAL PAIN: 0
BACK PAIN: 1

## 2021-06-02 ASSESSMENT — SOCIAL DETERMINANTS OF HEALTH (SDOH): HOW HARD IS IT FOR YOU TO PAY FOR THE VERY BASICS LIKE FOOD, HOUSING, MEDICAL CARE, AND HEATING?: NOT HARD AT ALL

## 2021-06-02 NOTE — PROGRESS NOTES
 Alcohol use: No    Drug use: No    Sexual activity: Yes     Partners: Female   Other Topics Concern    Not on file   Social History Narrative    Not on file     Social Determinants of Health     Financial Resource Strain: Low Risk     Difficulty of Paying Living Expenses: Not hard at all   Food Insecurity: No Food Insecurity    Worried About Running Out of Food in the Last Year: Never true    [address] in the Last Year: Never true   Transportation Needs:     Lack of Transportation (Medical):  Lack of Transportation (Non-Medical):    Physical Activity:     Days of Exercise per Week:     Minutes of Exercise per Session:    Stress:     Feeling of Stress :    Social Connections:     Frequency of Communication with Friends and Family:     Frequency of Social Gatherings with Friends and Family:     Attends [employer] Services:     Active Member of Clubs or Organizations:     Attends Club or Organization Meetings:     Marital Status:    Intimate Partner Violence:     Fear of Current or Ex-Partner:     Emotionally Abused:     Physically Abused:     Sexually Abused:        Family History   Problem Relation Age of Onset    High Blood Pressure Mother     High Cholesterol Mother     High Blood Pressure Father     High Cholesterol Father     Diabetes Father     High Blood Pressure Brother     High Blood Pressure Maternal Grandmother     Diabetes Maternal Grandmother     High Blood Pressure Maternal Grandfather     Heart Disease Maternal Grandfather     High Blood Pressure Paternal Grandfather     Heart Disease Paternal Grandfather        Past Medical History:   Diagnosis Date    GERD (gastroesophageal reflux disease)     Hypertension        Review of Systems   Constitutional: Negative for fever. HENT: Negative for congestion. Eyes: Negative for discharge. Respiratory: Negative for cough, shortness of breath and wheezing.     Cardiovascular: Negative for chest pain, palpitations and leg swelling. Gastrointestinal: Negative for abdominal pain, blood in stool, constipation, diarrhea, nausea and vomiting. Genitourinary: Negative for dysuria and hematuria. Musculoskeletal: Positive for back pain. Negative for myalgias. Skin: Negative for rash. Neurological: Negative for dizziness. Psychiatric/Behavioral: The patient is not nervous/anxious. Physical Exam  Constitutional:       General: He is not in acute distress. Appearance: He is not diaphoretic. HENT:      Right Ear: External ear normal.      Left Ear: External ear normal.      Mouth/Throat:      Pharynx: No oropharyngeal exudate. Eyes:      General: No scleral icterus. Right eye: No discharge. Left eye: No discharge. Neck:      Thyroid: No thyromegaly. Cardiovascular:      Rate and Rhythm: Normal rate and regular rhythm. Heart sounds: Normal heart sounds. No murmur heard. No friction rub. No gallop. Pulmonary:      Effort: Pulmonary effort is normal.      Breath sounds: Normal breath sounds. No wheezing. Abdominal:      General: Bowel sounds are normal. There is no distension. Palpations: Abdomen is soft. Tenderness: There is no abdominal tenderness. Musculoskeletal:         General: No tenderness. Normal range of motion. Cervical back: Normal range of motion. Comments: No ttp; no skin rash seen, no skin bumps palpated   Lymphadenopathy:      Cervical: No cervical adenopathy. Skin:     General: Skin is warm and dry. Findings: No erythema or rash. Neurological:      Mental Status: He is alert and oriented to person, place, and time.    Psychiatric:         Judgment: Judgment normal.                Jennifer Hurd, APRN - CNP

## 2021-07-20 ENCOUNTER — OFFICE VISIT (OUTPATIENT)
Dept: INTERNAL MEDICINE CLINIC | Age: 41
End: 2021-07-20
Payer: COMMERCIAL

## 2021-07-20 VITALS
HEART RATE: 98 BPM | OXYGEN SATURATION: 99 % | WEIGHT: 290 LBS | TEMPERATURE: 97.4 F | BODY MASS INDEX: 41.61 KG/M2 | DIASTOLIC BLOOD PRESSURE: 86 MMHG | SYSTOLIC BLOOD PRESSURE: 128 MMHG

## 2021-07-20 DIAGNOSIS — M54.41 ACUTE RIGHT-SIDED LOW BACK PAIN WITH RIGHT-SIDED SCIATICA: Primary | ICD-10-CM

## 2021-07-20 PROCEDURE — G8427 DOCREV CUR MEDS BY ELIG CLIN: HCPCS | Performed by: INTERNAL MEDICINE

## 2021-07-20 PROCEDURE — 1036F TOBACCO NON-USER: CPT | Performed by: INTERNAL MEDICINE

## 2021-07-20 PROCEDURE — G8417 CALC BMI ABV UP PARAM F/U: HCPCS | Performed by: INTERNAL MEDICINE

## 2021-07-20 PROCEDURE — 99213 OFFICE O/P EST LOW 20 MIN: CPT | Performed by: INTERNAL MEDICINE

## 2021-07-20 RX ORDER — METHYLPREDNISOLONE 4 MG/1
TABLET ORAL
Qty: 1 KIT | Refills: 0 | Status: SHIPPED | OUTPATIENT
Start: 2021-07-20 | End: 2021-12-15 | Stop reason: SDUPTHER

## 2021-07-20 ASSESSMENT — ENCOUNTER SYMPTOMS: BACK PAIN: 1

## 2021-07-20 NOTE — PROGRESS NOTES
7/20/21    Kaylan Marta III  1980      Chief Complaint   Patient presents with    Hip Pain         Andrew Russo:  1. Acute right-sided low back pain with right-sided sciatica  Recommend heat/ice. No heavy lifting. Discussed use, benefit, and side effects of prescribed medications. Barriers to compliance discussed. All patient questions answered. Pt voiced understanding. Hold relafen while taking steroids  Call if no improvement or worsening symptoms. Consider ortho referral if sxs persist. Plans to f/u with Chiropractor.   - methylPREDNISolone (MEDROL DOSEPACK) 4 MG tablet; Take by mouth. Dispense: 1 kit; Refill: 0      HPI    Here with c/o right low back pain. Evaluated by Melburn Felty for hip pain approx one month ago and \"threw\" his back out a week later. Has been seeing a chiropractor with some relief. initially radiated in to B groin but now has occasional radiation down right mid thigh. Denies numbness, tingling, weakness, loss of control over bowel/bladder. Pain exacerbated with certain movements. Getting some relief with Relafen    Review of Systems   Constitutional: Negative for fever. Musculoskeletal: Positive for back pain. Negative for gait problem. Radiculopathy       Current Outpatient Medications   Medication Sig Dispense Refill    methylPREDNISolone (MEDROL DOSEPACK) 4 MG tablet Take by mouth.  1 kit 0    nabumetone (RELAFEN) 750 MG tablet Take 1 tablet by mouth 2 times daily 60 tablet 0    aspirin 81 MG EC tablet Take 81 mg by mouth daily      Vitamin D (CHOLECALCIFEROL) 25 MCG (1000 UT) TABS tablet Take 1,000 Units by mouth daily      losartan (COZAAR) 25 MG tablet Take 1 tablet by mouth daily 90 tablet 3    buPROPion (WELLBUTRIN) 75 MG tablet TAKE 1 TABLET BY MOUTH DAILY FOR THREE DAYS, THEN INCREASE TO 1 TABLET TWO TIMES A DAY 60 tablet 5    cetirizine (ZYRTEC) 10 MG tablet Take 10 mg by mouth daily       lansoprazole (PREVACID) 15 MG capsule Take 15 mg by mouth daily       atorvastatin (LIPITOR) 40 MG tablet Take 40 mg by mouth daily       No current facility-administered medications for this visit. Physical Exam  Vitals reviewed. Constitutional:       Appearance: Normal appearance. He is not ill-appearing. Musculoskeletal:      Comments: Right lumbar tenderness to palpation; neg slr B; 5/5 strength B LE   Neurological:      Mental Status: He is alert.          Vitals:    07/20/21 1418   BP: 128/86   Pulse: 98   Temp: 97.4 °F (36.3 °C)   SpO2: 99% Calm

## 2021-07-27 ENCOUNTER — OFFICE VISIT (OUTPATIENT)
Dept: INTERNAL MEDICINE CLINIC | Age: 41
End: 2021-07-27
Payer: COMMERCIAL

## 2021-07-27 VITALS
SYSTOLIC BLOOD PRESSURE: 118 MMHG | OXYGEN SATURATION: 99 % | WEIGHT: 285 LBS | DIASTOLIC BLOOD PRESSURE: 80 MMHG | HEART RATE: 52 BPM | TEMPERATURE: 97.7 F | BODY MASS INDEX: 40.89 KG/M2

## 2021-07-27 DIAGNOSIS — M54.50 ACUTE LEFT-SIDED LOW BACK PAIN WITHOUT SCIATICA: ICD-10-CM

## 2021-07-27 PROCEDURE — 1036F TOBACCO NON-USER: CPT | Performed by: NURSE PRACTITIONER

## 2021-07-27 PROCEDURE — 99213 OFFICE O/P EST LOW 20 MIN: CPT | Performed by: NURSE PRACTITIONER

## 2021-07-27 PROCEDURE — G8417 CALC BMI ABV UP PARAM F/U: HCPCS | Performed by: NURSE PRACTITIONER

## 2021-07-27 PROCEDURE — G8427 DOCREV CUR MEDS BY ELIG CLIN: HCPCS | Performed by: NURSE PRACTITIONER

## 2021-07-27 RX ORDER — TIZANIDINE 2 MG/1
2 TABLET ORAL 3 TIMES DAILY PRN
Qty: 30 TABLET | Refills: 0 | Status: SHIPPED | OUTPATIENT
Start: 2021-07-27 | End: 2022-07-01

## 2021-07-27 RX ORDER — NABUMETONE 750 MG/1
750 TABLET, FILM COATED ORAL 2 TIMES DAILY
Qty: 60 TABLET | Refills: 0 | Status: SHIPPED | OUTPATIENT
Start: 2021-07-27 | End: 2022-07-01

## 2021-07-27 ASSESSMENT — PATIENT HEALTH QUESTIONNAIRE - PHQ9
SUM OF ALL RESPONSES TO PHQ QUESTIONS 1-9: 0
SUM OF ALL RESPONSES TO PHQ QUESTIONS 1-9: 0
2. FEELING DOWN, DEPRESSED OR HOPELESS: 0
SUM OF ALL RESPONSES TO PHQ QUESTIONS 1-9: 0
SUM OF ALL RESPONSES TO PHQ9 QUESTIONS 1 & 2: 0
1. LITTLE INTEREST OR PLEASURE IN DOING THINGS: 0

## 2021-07-27 ASSESSMENT — ENCOUNTER SYMPTOMS
NAUSEA: 0
BACK PAIN: 1
CHEST TIGHTNESS: 0
VOMITING: 0
COUGH: 0
DIARRHEA: 0
CONSTIPATION: 0
SINUS PAIN: 0
BOWEL INCONTINENCE: 0
SHORTNESS OF BREATH: 0
SORE THROAT: 0

## 2021-07-27 NOTE — PROGRESS NOTES
[facility]  [address]  [facility] Mariann CARUSO is a 42 y.o. male who presents today for his medical conditions/complaints as noted below. Tiffanie Red III is c/o of Back Pain (Lower back pain. ) and Neck Pain      Chief Complaint   Patient presents with    Back Pain     Lower back pain.  Neck Pain       HPI:     Back Pain  This is a recurrent problem. The current episode started more than 1 month ago. The problem occurs constantly. The problem has been waxing and waning since onset. The pain is present in the lumbar spine, gluteal and sacro-iliac. The quality of the pain is described as aching and shooting. The pain radiates to the left thigh and right thigh. The pain is at a severity of 3/10. The pain is mild. The pain is worse during the day. The symptoms are aggravated by lying down and position. Stiffness is present all day. Pertinent negatives include no bladder incontinence, bowel incontinence, chest pain, dysuria, fever, paresthesias, pelvic pain, perianal numbness, tingling or weakness. (Slightly altered gait 2/2 pain) Risk factors include obesity, history of steroid use and lack of exercise. He has tried NSAIDs, heat, home exercises, bed rest and muscle relaxant for the symptoms. The treatment provided mild relief.        Past Medical History:   Diagnosis Date    GERD (gastroesophageal reflux disease)     Hypertension         Past Surgical History:   Procedure Laterality Date    CHOLECYSTECTOMY      EYE SURGERY Left 1/2014    TPK    EYE SURGERY Right 3/2014    TPK    UPPER GASTROINTESTINAL ENDOSCOPY  7/9/2012    WISDOM TOOTH EXTRACTION         Family History   Problem Relation Age of Onset    High Blood Pressure Mother     High Cholesterol Mother     High Blood Pressure Father     High Cholesterol Father     Diabetes Father     High Blood Pressure Brother     High Blood Pressure Maternal Grandmother     Diabetes Maternal Grandmother     High Blood Pressure Maternal Grandfather     Heart Disease Maternal Grandfather     High Blood Pressure Paternal Grandfather     Heart Disease Paternal Grandfather        Social History     Tobacco Use    Smoking status: Never Smoker    Smokeless tobacco: Never Used   Substance Use Topics    Alcohol use: No        Current Outpatient Medications   Medication Sig Dispense Refill    nabumetone (RELAFEN) 750 MG tablet Take 1 tablet by mouth 2 times daily 60 tablet 0    tiZANidine (ZANAFLEX) 2 MG tablet Take 1 tablet by mouth 3 times daily as needed (back pain) 30 tablet 0    aspirin 81 MG EC tablet Take 81 mg by mouth daily      atorvastatin (LIPITOR) 40 MG tablet Take 40 mg by mouth daily      losartan (COZAAR) 25 MG tablet Take 1 tablet by mouth daily 90 tablet 3    buPROPion (WELLBUTRIN) 75 MG tablet TAKE 1 TABLET BY MOUTH DAILY FOR THREE DAYS, THEN INCREASE TO 1 TABLET TWO TIMES A DAY 60 tablet 5    cetirizine (ZYRTEC) 10 MG tablet Take 10 mg by mouth daily       lansoprazole (PREVACID) 15 MG capsule Take 15 mg by mouth daily        No current facility-administered medications for this visit. Allergies   Allergen Reactions    Penicillins Hives    Versed [Midazolam]      Extreme intolerance- 2mg versed  Decreased heart rate /decreased BP and tinnitus  Combination with fentanyl  Vagal response       Subjective:      Review of Systems   Constitutional: Negative for fever. HENT: Negative for sinus pain and sore throat. Respiratory: Negative for cough, chest tightness and shortness of breath. Cardiovascular: Negative for chest pain and palpitations. Gastrointestinal: Negative for bowel incontinence, constipation, diarrhea, nausea and vomiting. Genitourinary: Negative for bladder incontinence, dysuria, pelvic pain and urgency. Musculoskeletal: Positive for arthralgias, back pain and myalgias (Paraspinal muscle tenderness). and neck pain. Diagnoses and all orders for this visit:    Acute left-sided low back pain without sciatica  -     nabumetone (RELAFEN) 750 MG tablet; Take 1 tablet by mouth 2 times daily  -     tiZANidine (ZANAFLEX) 2 MG tablet; Take 1 tablet by mouth 3 times daily as needed (back pain)  -     XR LUMBAR SPINE (2-3 VIEWS); Future    Will investigate cause of low back pain with XR. Continue NSAID and add muscle relaxer for noted muscle spasm. Consider PT vs ortho referral if symptoms continue. Recommend daily stretches to relieve inflammation and promote ROM. Consider foam roller for self massage (can focus on lumbar spine, gluteal muscles, hips)    Return if symptoms worsen or fail to improve. Patientshould call the office immediately with new or ongoing signs or symptoms or worsening, or proceed to the emergency room. If you are on medications which could impair your senses, you are at risk of weakness, falls,dizziness, or drowsiness. You should be careful during activities which could place you at risk of harm, such as climbing, using stairs, operating machinery, or driving vehicles. If you feel you cannot safely do theseactivities, you should request others to help you, or avoid the activities altogether. If you are drowsy for any other reason, you should use the same precautions as listed above. Call if pattern of symptoms change or persists for an extended time.       Ronald Ji

## 2021-07-28 ENCOUNTER — HOSPITAL ENCOUNTER (OUTPATIENT)
Dept: GENERAL RADIOLOGY | Age: 41
Discharge: HOME | End: 2021-07-28
Payer: COMMERCIAL

## 2021-07-28 ENCOUNTER — HOSPITAL ENCOUNTER (OUTPATIENT)
Age: 41
Discharge: HOME | End: 2021-07-28
Payer: COMMERCIAL

## 2021-07-28 DIAGNOSIS — M54.50 ACUTE LEFT-SIDED LOW BACK PAIN WITHOUT SCIATICA: ICD-10-CM

## 2021-07-28 PROCEDURE — 72100 X-RAY EXAM L-S SPINE 2/3 VWS: CPT

## 2021-08-04 ENCOUNTER — TELEPHONE (OUTPATIENT)
Dept: INTERNAL MEDICINE CLINIC | Age: 41
End: 2021-08-04

## 2021-08-04 DIAGNOSIS — M54.9 BACK PAIN, UNSPECIFIED BACK LOCATION, UNSPECIFIED BACK PAIN LATERALITY, UNSPECIFIED CHRONICITY: Primary | ICD-10-CM

## 2021-08-04 NOTE — TELEPHONE ENCOUNTER
----- Message from Azalia Degroot sent at 8/4/2021  4:10 PM EDT -----  Subject: Referral Request    QUESTIONS   Reason for referral request? Susi Gresham from [facility] ) is calling to see   if pcp can write a referral for physicals therapy for low back , she ask   if we can fax to [phone] dr. Chapincito Talbot is the therapist and if   can please call [phone]   Has the physician seen you for this condition before? No   Preferred Specialist (if applicable)? Do you already have an appointment scheduled? Additional Information for Provider?   ---------------------------------------------------------------------------  --------------  CALL BACK INFO  What is the best way for the office to contact you? OK to leave message on   voicemail  Preferred Call Back Phone Number?  [phone]

## 2021-09-08 ENCOUNTER — HOSPITAL ENCOUNTER (OUTPATIENT)
Age: 41
Discharge: HOME | End: 2021-09-08
Payer: COMMERCIAL

## 2021-09-08 LAB
ALBUMIN SERPL-MCNC: 4 G/DL (ref 3.4–5)
ALP BLD-CCNC: 84 U/L (ref 40–129)
ALT SERPL-CCNC: 33 U/L (ref 10–40)
AST SERPL-CCNC: 21 U/L (ref 15–37)
BILIRUB SERPL-MCNC: 0.4 MG/DL (ref 0–1)
BILIRUBIN DIRECT: <0.2 MG/DL (ref 0–0.3)
BILIRUBIN, INDIRECT: NORMAL MG/DL (ref 0–1)
TOTAL PROTEIN: 6.7 G/DL (ref 6.4–8.2)

## 2021-09-08 PROCEDURE — 80076 HEPATIC FUNCTION PANEL: CPT

## 2021-09-08 PROCEDURE — 36415 COLL VENOUS BLD VENIPUNCTURE: CPT

## 2021-10-07 ENCOUNTER — TELEPHONE (OUTPATIENT)
Dept: INTERNAL MEDICINE CLINIC | Age: 41
End: 2021-10-07

## 2021-10-07 NOTE — TELEPHONE ENCOUNTER
Called patient to let him know that we have cancelled his 10/27/21 appt. With Dr. Nguyen Bradley since she left the practice on 10/1/21. Patient will call the office back and let us know if he wants to stay with [facility] and who he wants for PCP. If patient stays with [facility] and has a PCP we need to reschedule his 10/27/21 appt.

## 2021-10-25 ENCOUNTER — OFFICE VISIT (OUTPATIENT)
Dept: INTERNAL MEDICINE CLINIC | Age: 41
End: 2021-10-25
Payer: COMMERCIAL

## 2021-10-25 VITALS
OXYGEN SATURATION: 98 % | SYSTOLIC BLOOD PRESSURE: 134 MMHG | DIASTOLIC BLOOD PRESSURE: 84 MMHG | BODY MASS INDEX: 42.33 KG/M2 | HEART RATE: 72 BPM | WEIGHT: 295 LBS

## 2021-10-25 DIAGNOSIS — Z00.00 ENCOUNTER FOR WELL ADULT EXAM WITHOUT ABNORMAL FINDINGS: Primary | ICD-10-CM

## 2021-10-25 DIAGNOSIS — I10 ESSENTIAL HYPERTENSION: ICD-10-CM

## 2021-10-25 DIAGNOSIS — F32.9 REACTIVE DEPRESSION: ICD-10-CM

## 2021-10-25 PROCEDURE — 99396 PREV VISIT EST AGE 40-64: CPT | Performed by: NURSE PRACTITIONER

## 2021-10-25 PROCEDURE — G8484 FLU IMMUNIZE NO ADMIN: HCPCS | Performed by: NURSE PRACTITIONER

## 2021-10-25 RX ORDER — BUPROPION HYDROCHLORIDE 75 MG/1
TABLET ORAL
Qty: 60 TABLET | Refills: 11 | Status: SHIPPED | OUTPATIENT
Start: 2021-10-25

## 2021-10-25 RX ORDER — LOSARTAN POTASSIUM 50 MG/1
50 TABLET ORAL DAILY
Qty: 30 TABLET | Refills: 5 | Status: SHIPPED | OUTPATIENT
Start: 2021-10-25 | End: 2021-12-21

## 2021-10-25 ASSESSMENT — ENCOUNTER SYMPTOMS
DIARRHEA: 0
SHORTNESS OF BREATH: 0
SINUS PAIN: 0
CONSTIPATION: 0
CHEST TIGHTNESS: 0
SORE THROAT: 0
VOMITING: 0
COUGH: 0
NAUSEA: 0

## 2021-10-25 NOTE — PROGRESS NOTES
Well Adult Note  Name: Carlos Jerez Date: 10/25/2021   MRN: <[id number]> Sex: Male   Age: 42 y.o. Ethnicity: Non- / Non    : 1980 Race: White (non-)      Bhavin Connell III is here for well adult exam.  History:    Mr. Shane Pa presents for his annual check up. He states he is doing well overall. Recently sought care with [facility] for bilateral tennis elbow concerns and was referred to PT. Symptoms improving. BP stated to hover around 130-140/80-90 consistently. Takes his losartan dose consistently. States diet overall healthy. Also wishes to begin weaning process from prevacid and move toward pepcid given longevity of taking. Denies worsening of acid reflux symptoms. Review of Systems   Constitutional: Negative for fever. HENT: Negative for sinus pain and sore throat. Respiratory: Negative for cough, chest tightness and shortness of breath. Cardiovascular: Negative for chest pain and palpitations. Gastrointestinal: Negative for constipation, diarrhea, nausea and vomiting. Genitourinary: Negative for dysuria and urgency. Skin: Negative for rash. Neurological: Negative for dizziness and weakness. Allergies   Allergen Reactions    Penicillins Hives    Versed [Midazolam]      Extreme intolerance- 2mg versed  Decreased heart rate /decreased BP and tinnitus  Combination with fentanyl  Vagal response       Prior to Visit Medications    Medication Sig Taking?  Authorizing Provider   buPROPion (WELLBUTRIN) 75 MG tablet TAKE 1 TABLET TWO TIMES A DAY Yes MARGY Godwin CNP   losartan (COZAAR) 50 MG tablet Take 1 tablet by mouth daily Yes MARGY Godwin CNP   aspirin 81 MG EC tablet Take 81 mg by mouth daily Yes Historical Provider, MD   cetirizine (ZYRTEC) 10 MG tablet Take 10 mg by mouth daily  Yes Historical Provider, MD   lansoprazole (PREVACID) 15 MG capsule Take 15 mg by mouth daily  Yes Historical Provider, MD   nabumetone (RELAFEN) 750 MG tablet Take 1 tablet by mouth 2 times daily  Patient not taking: Reported on 10/25/2021  MARGY Millard - CNP   tiZANidine (ZANAFLEX) 2 MG tablet Take 1 tablet by mouth 3 times daily as needed (back pain)  Patient not taking: Reported on 10/25/2021  Vick Pleasure, APRN - CNP   atorvastatin (LIPITOR) 40 MG tablet Take 40 mg by mouth daily  Historical Provider, MD       Past Medical History:   Diagnosis Date    GERD (gastroesophageal reflux disease)     Hypertension        Past Surgical History:   Procedure Laterality Date    CHOLECYSTECTOMY      EYE SURGERY Left 1/2014    TPK    EYE SURGERY Right 3/2014    TPK    UPPER GASTROINTESTINAL ENDOSCOPY  7/9/2012    WISDOM TOOTH EXTRACTION         Family History   Problem Relation Age of Onset    High Blood Pressure Mother     High Cholesterol Mother     High Blood Pressure Father     High Cholesterol Father     Diabetes Father     High Blood Pressure Brother     High Blood Pressure Maternal Grandmother     Diabetes Maternal Grandmother     High Blood Pressure Maternal Grandfather     Heart Disease Maternal Grandfather     High Blood Pressure Paternal Grandfather     Heart Disease Paternal Grandfather        Social History     Tobacco Use    Smoking status: Never Smoker    Smokeless tobacco: Never Used   Substance Use Topics    Alcohol use: No    Drug use: No       Objective   BP (!) 142/88 (Site: Right Upper Arm, Position: Sitting, Cuff Size: Large Adult)   Pulse 72   Wt 295 lb (133.8 kg)   SpO2 98%   BMI 42.33 kg/m²   Wt Readings from Last 3 Encounters:   10/25/21 295 lb (133.8 kg)   07/27/21 285 lb (129.3 kg)   07/20/21 290 lb (131.5 kg)       Physical Exam  Constitutional:       Appearance: Normal appearance. He is well-developed. HENT:      Head: Normocephalic.       Right Ear: Hearing and external ear normal.      Left Ear: Hearing and external ear normal.      Nose: Nose normal.   Eyes:      General: Lids are normal. Lids are everted, no foreign bodies appreciated. Conjunctiva/sclera: Conjunctivae normal.      Pupils: Pupils are equal, round, and reactive to light. Neck:      Thyroid: No thyroid mass. Vascular: Normal carotid pulses. No carotid bruit or JVD. Cardiovascular:      Rate and Rhythm: Normal rate and regular rhythm. No extrasystoles are present. Chest Wall: PMI is not displaced. Pulses:           Radial pulses are 2+ on the right side and 2+ on the left side. Dorsalis pedis pulses are 2+ on the right side and 2+ on the left side. Heart sounds: Normal heart sounds, S1 normal and S2 normal. Heart sounds not distant. No murmur heard. No friction rub. No gallop. No S3 or S4 sounds. Pulmonary:      Effort: Pulmonary effort is normal. No respiratory distress. Breath sounds: Normal breath sounds. No decreased breath sounds, wheezing, rhonchi or rales. Abdominal:      General: Bowel sounds are normal. There is no distension. Palpations: Abdomen is soft. Tenderness: There is no abdominal tenderness. There is no rebound. Musculoskeletal:         General: Normal range of motion. Cervical back: Full passive range of motion without pain, normal range of motion and neck supple. Skin:     General: Skin is warm and dry. Neurological:      Cranial Nerves: No cranial nerve deficit. Psychiatric:         Speech: Speech normal.         Behavior: Behavior normal.         Thought Content: Thought content normal.         Judgment: Judgment normal.         Assessment   Plan   1.  Encounter for well adult exam without abnormal findings    Discussed healthy lifestyle habits at length (encouraged regular exercise, healthy diet, no smoking, adequate sleep, sunscreen, safety items (car/driving, illness prevention.)    · A preventive eye exam performed by an eye specialist is recommended every 1-2 years to screen for glaucoma; cataracts, macular degeneration, and other eye disorders. · A preventive dental visit is recommended every 6 months. · Try to get at least 150 minutes of exercise per week or 10,000 steps per day on a pedometer . · You need 3036-2244 mg of calcium and 3784-5272 IU of vitamin D per day. It is possible to meet your calcium requirement with diet alone, but a vitamin D supplement is usually necessary to meet this goal.  · When exposed to the sun, use a sunscreen that protects against both UVA and UVB radiation with an SPF of 30 or greater. Reapply every 2 to 3 hours or after sweating, drying off with a towel, or swimming. · Always wear a seat belt when traveling in a car. Always wear a helmet when riding a bicycle or motorcycle. 2. Reactive depression  -     buPROPion (WELLBUTRIN) 75 MG tablet; TAKE 1 TABLET TWO TIMES A DAY, Disp-60 tablet, R-11Normal    Stable. Continue. 3. Essential hypertension  -     losartan (COZAAR) 50 MG tablet; Take 1 tablet by mouth daily, Disp-30 tablet, R-5Normal     Slight increase of losartan to 50 mg daily.  Encouraged decreasing sodium in the diet, weight loss, and gradual increases in exercise at least 20 minutes daily to further benefit BP    Personalized Preventive Plan   Current Health Maintenance Status  Immunization History   Administered Date(s) Administered    COVID-19, Moderna, PF, 100mcg/0.5mL 01/19/2021, 02/16/2021    Influenza Vaccine, unspecified formulation 11/09/2016    Influenza Virus Vaccine 10/29/2015, 10/15/2019, 10/11/2021    PPD Test 11/15/2017    Tdap (Boostrix, Adacel) 04/04/2020        Health Maintenance   Topic Date Due    Hepatitis C screen  Never done    HIV screen  Never done    Diabetes screen  Never done    Potassium monitoring  11/28/2021    Creatinine monitoring  11/28/2021    Lipid screen  02/22/2024    DTaP/Tdap/Td vaccine (2 - Td or Tdap) 04/04/2030    Flu vaccine  Completed    COVID-19 Vaccine  Completed    Hepatitis A vaccine  Aged Out    Hepatitis B vaccine  Aged C/ Asif Dom 19 Hib vaccine  Aged Out    Meningococcal (ACWY) vaccine  Aged Out    Pneumococcal 0-64 years Vaccine  Aged Out     Recommendations for [employer] Due: see orders and patient instructions/AVS.  . Obesity Counseling: Assessed behavioral health risks and factors affecting choice of behavior. Suggested weight control approaches, including dietary changes behavioral modification and follow up plan. Provided educational and support documentation. Time spent (minutes): 3    Cardiovascular Disease Risk Counseling: Assessed the patient's risk to develop cardiovascular disease and reviewed main risk factors. Reviewed steps to reduce disease risk including:   · Quitting tobacco use, reducing amount smoked, or not starting the habit  · Making healthy food choices  · Being physically active and gradualy increasing activity levels   · Reduce weight and determine a healthy BMI goal  · Monitor blood pressure and treat if higher than 140/90 mmHg  · Maintain blood total cholesterol levels under 5 mmol/l or 190 mg/dl  · Maintain LDL cholesterol levels under 3.0 mmol/l or 115 mg/dl   · Control blood glucose levels  · Consider taking aspirin (75 mg daily), once blood pressure is controlled   Provided a follow up plan.   Time spent (minutes): 5

## 2021-10-25 NOTE — PATIENT INSTRUCTIONS
Body Mass Index: Care Instructions  Your Care Instructions     Body mass index (BMI) can help you see if your weight is raising your risk for health problems. It uses a formula to compare how much you weigh with how tall you are. · A BMI lower than 18.5 is considered underweight. · A BMI between 18.5 and 24.9 is considered healthy. · A BMI between 25 and 29.9 is considered overweight. A BMI of 30 or higher is considered obese. If your BMI is in the normal range, it means that you have a lower risk for weight-related health problems. If your BMI is in the overweight or obese range, you may be at increased risk for weight-related health problems, such as high blood pressure, heart disease, stroke, arthritis or joint pain, and diabetes. If your BMI is in the underweight range, you may be at increased risk for health problems such as fatigue, lower protection (immunity) against illness, muscle loss, bone loss, hair loss, and hormone problems. BMI is just one measure of your risk for weight-related health problems. You may be at higher risk for health problems if you are not active, you eat an unhealthy diet, or you drink too much alcohol or use tobacco products. Follow-up care is a key part of your treatment and safety. Be sure to make and go to all appointments, and call your doctor if you are having problems. It's also a good idea to know your test results and keep a list of the medicines you take. How can you care for yourself at home? · Practice healthy eating habits. This includes eating plenty of fruits, vegetables, whole grains, lean protein, and low-fat dairy. · If your doctor recommends it, get more exercise. Walking is a good choice. Bit by bit, increase the amount you walk every day. Try for at least 30 minutes on most days of the week. · Do not smoke. Smoking can increase your risk for health problems. If you need help quitting, talk to your doctor about stop-smoking programs and medicines. These can increase your chances of quitting for good. · Limit alcohol to 2 drinks a day for men and 1 drink a day for women. Too much alcohol can cause health problems. If you have a BMI higher than 25  · Your doctor may do other tests to check your risk for weight-related health problems. This may include measuring the distance around your waist. A waist measurement of more than 40 inches in men or 35 inches in women can increase the risk of weight-related health problems. · Talk with your doctor about steps you can take to stay healthy or improve your health. You may need to make lifestyle changes to lose weight and stay healthy, such as changing your diet and getting regular exercise. If you have a BMI lower than 18.5  · Your doctor may do other tests to check your risk for health problems. · Talk with your doctor about steps you can take to stay healthy or improve your health. You may need to make lifestyle changes to gain or maintain weight and stay healthy, such as getting more healthy foods in your diet and doing exercises to build muscle. Where can you learn more? Go to https://[employer]. org and sign in to your [employer] account. Enter S176 in the Versus box to learn more about \"Body Mass Index: Care Instructions. \"     If you do not have an account, please click on the \"Sign Up Now\" link. Current as of: March 17, 2021               Content Version: 13.0  © 6392-5195 Healthwise, Incorporated. Care instructions adapted under license by [facility] ([facility]). If you have questions about a medical condition or this instruction, always ask your healthcare professional. [place] [address] any warranty or liability for your use of this information. Eating Healthy Foods: Care Instructions  Your Care Instructions     Eating healthy foods can help lower your risk for disease.  Healthy food gives you energy and keeps your heart strong, your brain active, your muscles working, and your bones strong. A healthy diet includes a variety of foods from the basic food groups: grains, vegetables, fruits, milk and milk products, and meat and beans. Some people may eat more of their favorite foods from only one food group and, as a result, miss getting the nutrients they need. So, it is important to pay attention not only to what you eat but also to what you are missing from your diet. You can eat a healthy, balanced diet by making a few small changes. Follow-up care is a key part of your treatment and safety. Be sure to make and go to all appointments, and call your doctor if you are having problems. It's also a good idea to know your test results and keep a list of the medicines you take. How can you care for yourself at home? Look at what you eat  · Keep a food diary for a week or two and record everything you eat or drink. Track the number of servings you eat from each food group. · For a balanced diet every day, eat a variety of:  ? 6 or more ounce-equivalents of grains, such as cereals, breads, crackers, rice, or pasta, every day. An ounce-equivalent is 1 slice of bread, 1 cup of ready-to-eat cereal, or ½ cup of cooked rice, cooked pasta, or cooked cereal.  ? 2½ cups of vegetables, especially:  § Dark-green vegetables such as broccoli and spinach. § Orange vegetables such as carrots and sweet potatoes. § Dry beans (such as joshi and kidney beans) and peas (such as lentils). ? 2 cups of fresh, frozen, or canned fruit. A small apple or 1 banana or orange equals 1 cup. ? 3 cups of nonfat or low-fat milk, yogurt, or other milk products. ? 5½ ounces of meat and beans, such as chicken, fish, lean meat, beans, nuts, and seeds. One egg, 1 tablespoon of peanut butter, ½ ounce nuts or seeds, or ¼ cup of cooked beans equals 1 ounce of meat. · Learn how to read food labels for serving sizes and ingredients.  Fast-food and convenience-food meals often contain few or no fruits or as baby carrots, salad mixes, and chopped or shredded broccoli and cauliflower. · Buy packaged, presliced fruits, such as melon or pineapple. · Choose 100% fruit or vegetable juice instead of soda. Limit juice intake to 4 to 6 oz (½ to ¾ cup) a day. · Blend low-fat yogurt, fruit juice, and canned or frozen fruit to make a smoothie for breakfast or a snack. Where can you learn more? Go to https://Rule.pe[employer]rikeface.[employer]. org and sign in to your [employer] account. Enter Y792 in the BigTeams box to learn more about \"Eating Healthy Foods: Care Instructions. \"     If you do not have an account, please click on the \"Sign Up Now\" link. Current as of: December 17, 2020               Content Version: 13.0  © 4699-4161 [employer]. Care instructions adapted under license by [facility] ([facility]). If you have questions about a medical condition or this instruction, always ask your healthcare professional. [place] [address] any warranty or liability for your use of this information. Well Visit, Ages 25 to 48: Care Instructions  Overview     Well visits can help you stay healthy. Your doctor has checked your overall health and may have suggested ways to take good care of yourself. Your doctor also may have recommended tests. At home, you can help prevent illness with healthy eating, regular exercise, and other steps. Follow-up care is a key part of your treatment and safety. Be sure to make and go to all appointments, and call your doctor if you are having problems. It's also a good idea to know your test results and keep a list of the medicines you take. How can you care for yourself at home? · Get screening tests that you and your doctor decide on. Screening helps find diseases before any symptoms appear. · Eat healthy foods. Choose fruits, vegetables, whole grains, protein, and low-fat dairy foods. Limit fat, especially saturated fat.  Reduce salt in your diet.  · Limit alcohol. If you are a man, have no more than 2 drinks a day or 14 drinks a week. If you are a woman, have no more than 1 drink a day or 7 drinks a week. · Get at least 30 minutes of physical activity on most days of the week. Walking is a good choice. You also may want to do other activities, such as running, swimming, cycling, or playing tennis or team sports. Discuss any changes in your exercise program with your doctor. · Reach and stay at a healthy weight. This will lower your risk for many problems, such as obesity, diabetes, heart disease, and high blood pressure. · Do not smoke or allow others to smoke around you. If you need help quitting, talk to your doctor about stop-smoking programs and medicines. These can increase your chances of quitting for good. · Care for your mental health. It is easy to get weighed down by worry and stress. Learn strategies to manage stress, like deep breathing and mindfulness, and stay connected with your family and community. If you find you often feel sad or hopeless, talk with your doctor. Treatment can help. · Talk to your doctor about whether you have any risk factors for sexually transmitted infections (STIs). You can help prevent STIs if you wait to have sex with a new partner (or partners) until you've each been tested for STIs. It also helps if you use condoms (male or female condoms) and if you limit your sex partners to one person who only has sex with you. Vaccines are available for some STIs, such as HPV. · Use birth control if it's important to you to prevent pregnancy. Talk with your doctor about the choices available and what might be best for you. · If you think you may have a problem with alcohol or drug use, talk to your doctor. This includes prescription medicines (such as amphetamines and opioids) and illegal drugs (such as cocaine and methamphetamine).  Your doctor can help you figure out what type of treatment is best for you.  · Protect your skin from too much sun. When you're outdoors from 10 a.m. to 4 p.m., stay in the shade or cover up with clothing and a hat with a wide brim. Wear sunglasses that block UV rays. Even when it's cloudy, put broad-spectrum sunscreen (SPF 30 or higher) on any exposed skin. · See a dentist one or two times a year for checkups and to have your teeth cleaned. · Wear a seat belt in the car. When should you call for help? Watch closely for changes in your health, and be sure to contact your doctor if you have any problems or symptoms that concern you. Where can you learn more? Go to https://LexdirpeBlekkoeb.OmniGuide. org and sign in to your [employer] account. Enter P072 in the WatchGuard box to learn more about \"Well Visit, Ages 25 to 48: Care Instructions. \"     If you do not have an account, please click on the \"Sign Up Now\" link. Current as of: February 11, 2021               Content Version: 13.0  © 4496-3810 Healthwise, Incorporated. Care instructions adapted under license by [facility] ([facility]). If you have questions about a medical condition or this instruction, always ask your healthcare professional. [address] any warranty or liability for your use of this information.

## 2021-11-18 ENCOUNTER — PATIENT MESSAGE (OUTPATIENT)
Dept: INTERNAL MEDICINE CLINIC | Age: 41
End: 2021-11-18

## 2021-11-18 RX ORDER — TIZANIDINE 2 MG/1
2 TABLET ORAL 3 TIMES DAILY PRN
Qty: 30 TABLET | Refills: 1 | Status: SHIPPED | OUTPATIENT
Start: 2021-11-18 | End: 2022-07-01

## 2021-11-18 NOTE — TELEPHONE ENCOUNTER
From: Woody Lab III  To: Joe Rodas  Sent: 11/18/2021 8:46 AM EST  Subject: Non-Urgent Medical Question    Concepción Salazar,    I saw you earlier this year when I threw back into spasm. You prescribed me some physical therapy, Tizanidine, and I believe a round of steroids. All that helped and I have been doing fine until my back randomly went into spasm last week. I am already doing my physical therapy exercises at home and taking Aleve and the last of the tizanidine. Is it possible to for you to prescribe me some more Tizanidine and and maybe some diclofenac(or similar)? I'm trying to avoid the steroids if possible but I'm open to them if needed. Or would you rather see me in the office? Have a great day !   Zach Hancock

## 2021-12-07 ENCOUNTER — TELEPHONE (OUTPATIENT)
Dept: INTERNAL MEDICINE CLINIC | Age: 41
End: 2021-12-07

## 2021-12-07 DIAGNOSIS — Z23 NEED FOR VACCINATION: Primary | ICD-10-CM

## 2021-12-07 NOTE — TELEPHONE ENCOUNTER
Patient is wanting a titer drawn so he can prove to a current employer that he had his MMR and if they are not sufficient enough after the blood test he is willing to get a booster. Can you put a order in for a titer to be drawn? 40 yo F, no significant pmh, p/w intermittent episodes of rapid heart rate, dizziness, shortness of breath, and right shoulder pain over the past 2 weeks. She states that the episodes come and go at random, though she thinks they may be associated with eating, and says they typically last 15 minutes and occur 1-2 times a day on average. O/w she denies any cough, nausea, vomiting, diarrhea, abdominal pain, fevers, chills, sick contacts, headaches, other complaitns at this time.

## 2021-12-09 ENCOUNTER — HOSPITAL ENCOUNTER (OUTPATIENT)
Age: 41
Discharge: HOME | End: 2021-12-09
Payer: COMMERCIAL

## 2021-12-09 DIAGNOSIS — Z23 NEED FOR VACCINATION: ICD-10-CM

## 2021-12-09 PROCEDURE — 86787 VARICELLA-ZOSTER ANTIBODY: CPT

## 2021-12-09 PROCEDURE — 86762 RUBELLA ANTIBODY: CPT

## 2021-12-09 PROCEDURE — 86765 RUBEOLA ANTIBODY: CPT

## 2021-12-09 PROCEDURE — 86735 MUMPS ANTIBODY: CPT

## 2021-12-09 PROCEDURE — 36415 COLL VENOUS BLD VENIPUNCTURE: CPT

## 2021-12-10 LAB
MEASLES IMMUNE (IGG): NORMAL
MUMPS AB IGG: NORMAL
RUBELLA ANTIBODY IGG: NORMAL
VARICELLA-ZOSTER VIRUS AB, IGG: NORMAL

## 2021-12-15 ENCOUNTER — OFFICE VISIT (OUTPATIENT)
Dept: INTERNAL MEDICINE CLINIC | Age: 41
End: 2021-12-15
Payer: COMMERCIAL

## 2021-12-15 VITALS
HEIGHT: 70 IN | BODY MASS INDEX: 41.66 KG/M2 | WEIGHT: 291 LBS | SYSTOLIC BLOOD PRESSURE: 128 MMHG | HEART RATE: 118 BPM | OXYGEN SATURATION: 98 % | DIASTOLIC BLOOD PRESSURE: 76 MMHG | TEMPERATURE: 98.2 F

## 2021-12-15 DIAGNOSIS — M54.41 CHRONIC RIGHT-SIDED LOW BACK PAIN WITH RIGHT-SIDED SCIATICA: Primary | ICD-10-CM

## 2021-12-15 DIAGNOSIS — M54.41 ACUTE RIGHT-SIDED LOW BACK PAIN WITH RIGHT-SIDED SCIATICA: ICD-10-CM

## 2021-12-15 DIAGNOSIS — G89.29 CHRONIC RIGHT-SIDED LOW BACK PAIN WITH RIGHT-SIDED SCIATICA: Primary | ICD-10-CM

## 2021-12-15 DIAGNOSIS — R19.7 DIARRHEA, UNSPECIFIED TYPE: ICD-10-CM

## 2021-12-15 PROCEDURE — G8484 FLU IMMUNIZE NO ADMIN: HCPCS | Performed by: NURSE PRACTITIONER

## 2021-12-15 PROCEDURE — G8417 CALC BMI ABV UP PARAM F/U: HCPCS | Performed by: NURSE PRACTITIONER

## 2021-12-15 PROCEDURE — 1036F TOBACCO NON-USER: CPT | Performed by: NURSE PRACTITIONER

## 2021-12-15 PROCEDURE — G8427 DOCREV CUR MEDS BY ELIG CLIN: HCPCS | Performed by: NURSE PRACTITIONER

## 2021-12-15 PROCEDURE — 99213 OFFICE O/P EST LOW 20 MIN: CPT | Performed by: NURSE PRACTITIONER

## 2021-12-15 RX ORDER — METHYLPREDNISOLONE 4 MG/1
TABLET ORAL
Qty: 1 KIT | Refills: 0 | Status: SHIPPED | OUTPATIENT
Start: 2021-12-15 | End: 2021-12-21

## 2021-12-15 RX ORDER — ONDANSETRON 4 MG/1
4 TABLET, ORALLY DISINTEGRATING ORAL 3 TIMES DAILY PRN
Qty: 21 TABLET | Refills: 0 | Status: SHIPPED | OUTPATIENT
Start: 2021-12-15

## 2021-12-15 ASSESSMENT — PATIENT HEALTH QUESTIONNAIRE - PHQ9
SUM OF ALL RESPONSES TO PHQ QUESTIONS 1-9: 0
1. LITTLE INTEREST OR PLEASURE IN DOING THINGS: 0
2. FEELING DOWN, DEPRESSED OR HOPELESS: 0
SUM OF ALL RESPONSES TO PHQ QUESTIONS 1-9: 0
SUM OF ALL RESPONSES TO PHQ9 QUESTIONS 1 & 2: 0
SUM OF ALL RESPONSES TO PHQ QUESTIONS 1-9: 0

## 2021-12-15 ASSESSMENT — ENCOUNTER SYMPTOMS
NAUSEA: 1
CONSTIPATION: 0
DIARRHEA: 1
COUGH: 0
VOMITING: 0
SINUS PAIN: 0
BACK PAIN: 1
CHEST TIGHTNESS: 0
SORE THROAT: 0
SHORTNESS OF BREATH: 0
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
## 2021-12-21 DIAGNOSIS — I10 ESSENTIAL HYPERTENSION: ICD-10-CM

## 2021-12-21 RX ORDER — VALSARTAN 80 MG/1
80 TABLET ORAL DAILY
Qty: 30 TABLET | Refills: 0 | Status: SHIPPED | OUTPATIENT
Start: 2021-12-21 | End: 2022-01-17 | Stop reason: SDUPTHER

## 2021-12-23 ENCOUNTER — HOSPITAL ENCOUNTER (OUTPATIENT)
Age: 41
Discharge: HOME | End: 2021-12-23
Payer: COMMERCIAL

## 2021-12-23 LAB
ALBUMIN SERPL-MCNC: 4.2 G/DL (ref 3.4–5)
ALP BLD-CCNC: 89 U/L (ref 40–129)
ALT SERPL-CCNC: 44 U/L (ref 10–40)
AST SERPL-CCNC: 27 U/L (ref 15–37)
BILIRUB SERPL-MCNC: 0.3 MG/DL (ref 0–1)
BILIRUBIN DIRECT: <0.2 MG/DL (ref 0–0.3)
BILIRUBIN, INDIRECT: ABNORMAL MG/DL (ref 0–1)
TOTAL PROTEIN: 6.9 G/DL (ref 6.4–8.2)

## 2021-12-23 PROCEDURE — 36415 COLL VENOUS BLD VENIPUNCTURE: CPT

## 2021-12-23 PROCEDURE — 80076 HEPATIC FUNCTION PANEL: CPT

## 2022-01-03 ENCOUNTER — TELEPHONE (OUTPATIENT)
Dept: INTERNAL MEDICINE CLINIC | Age: 42
End: 2022-01-03

## 2022-01-03 NOTE — TELEPHONE ENCOUNTER
[facility] called and was needing the MRI faxed over to them for the test to be done. Faxed over and successful.

## 2022-01-17 RX ORDER — VALSARTAN 80 MG/1
80 TABLET ORAL DAILY
Qty: 30 TABLET | Refills: 5 | Status: SHIPPED | OUTPATIENT
Start: 2022-01-17 | End: 2022-10-05 | Stop reason: SDUPTHER

## 2022-01-20 ENCOUNTER — OFFICE VISIT (OUTPATIENT)
Dept: ORTHOPEDIC SURGERY | Age: 42
End: 2022-01-20
Payer: COMMERCIAL

## 2022-01-20 VITALS — BODY MASS INDEX: 41.66 KG/M2 | WEIGHT: 291 LBS | HEIGHT: 70 IN

## 2022-01-20 DIAGNOSIS — M51.26 HNP (HERNIATED NUCLEUS PULPOSUS), LUMBAR: Primary | ICD-10-CM

## 2022-01-20 PROCEDURE — 99214 OFFICE O/P EST MOD 30 MIN: CPT | Performed by: ORTHOPAEDIC SURGERY

## 2022-01-20 NOTE — PROGRESS NOTES
LUMBAR SPINE    CHIEF COMPLAINT:    Chief Complaint   Patient presents with    Back Pain     lumbar       HISTORY OF PRESENT ILLNESS:     Mr. Sathya Marquez III is a pleasant 42 y.o. male here for consultation regarding his LBP. He states his pain began insidiously about 6 months ago after standing to get out of a chair. His pain has steadily persisted since then. He rates his back pain 3-4/10 and leg pain 1/10. He describes the pain as aching. Pain is worse with standing and leaning forward, and improved some with sitting. Pain originates in his right lumbar spine. The pain will intermittently radiate into bilateral groin. He notes infrequent radicular symptoms into his right anterior thigh and posterior calf. . He denies numbness and tingling in his legs. He denies weakness of his legs and denies saddle numbness or bowel or bladder dysfunction. The pain occasionally disrupts his sleep. He has tried PT and oral steroids with temporary improvement. He currently takes NSAIDs and muscle relaxer daily with some improvement.     Current/Past Treatment:   · Physical Therapy: Yes, 6 weeks with temporary improvement  · Chiropractic:  Yes with some improvement   · Injection:  No   · Medications:  Medrol dose pack, Voltaren, Zanaflex     Past Medical History:   Past Medical History:   Diagnosis Date    GERD (gastroesophageal reflux disease)     Hypertension         Past Surgical History:     Past Surgical History:   Procedure Laterality Date    CHOLECYSTECTOMY      EYE SURGERY Left 1/2014    TPK    EYE SURGERY Right 3/2014    TPK    UPPER GASTROINTESTINAL ENDOSCOPY  7/9/2012    WISDOM TOOTH EXTRACTION         Current Medications:     Current Outpatient Medications:     valsartan (DIOVAN) 80 MG tablet, Take 1 tablet by mouth daily, Disp: 30 tablet, Rfl: 5    ondansetron (ZOFRAN-ODT) 4 MG disintegrating tablet, Take 1 tablet by mouth 3 times daily as needed for Nausea or Vomiting, Disp: 21 tablet, Rfl: 0   bleeding or bruising  ENDOCRINE: Denies excessive thirst, urination, heat/cold  RESPIRATORY: Denies current dyspnea, cough  GI: Denies nausea, vomiting, diarrhea   : Denies bowel or bladder issues      PHYSICAL EXAM:    Vitals: Height 5' 10\" (1.778 m), weight 291 lb (132 kg). GENERAL EXAM:  · General Apparence: Patient is adequately groomed with no evidence of malnutrition. · Orientation: The patient is oriented to time, place and person. · Mood & Affect:The patient's mood and affect are appropriate. · Vascular: Examination reveals no swelling tenderness in upper or lower extremities. Good capillary refill. · Lymphatic: The lymphatic examination bilaterally reveals all areas to be without enlargement or induration  · Sensation: Sensation is intact without deficit  · Coordination/Balance: Good coordination. LUMBAR/SACRAL EXAMINATION:  · Inspection: Local inspection shows no step-off or bruising. Lumbar alignment is normal.  Sagittal and Coronal balance is neutral.      · Palpation:   No evidence of tenderness at the midline. No tenderness bilaterally at the paraspinal or trochanters. There is no step-off or paraspinal spasm. · Range of Motion: Lumbar flexion, extension and rotation are mildly limited due to pain. · Strength:   Strength testing is 5/5 in all muscle groups tested. · Special Tests:   Straight leg raise and crossed SLR negative. Leg length and pelvis level. · Skin: There are no rashes, ulcerations or lesions. · Reflexes: Reflexes are symmetrically 1+ at the patellar and ankle tendons. Clonus absent bilaterally at the feet. · Gait & station: normal, patient ambulates without assistance    · Additional Examinations:   · RIGHT LOWER EXTREMITY: Inspection/examination of the right lower extremity does not show any tenderness, deformity or injury. Range of motion is unremarkable. There is no gross instability. There are no rashes, ulcerations or lesions.  Strength and tone are normal.  · LEFT LOWER EXTREMITY:  Inspection/examination of the left lower extremity does not show any tenderness, deformity or injury. Range of motion is unremarkable. There is no gross instability. There are no rashes, ulcerations or lesions. Strength and tone are normal.    Diagnostic Testing:    I reviewed MRI images of his lumbar spine from 1/5/22. They show a central and right paracentral disc protrusion L4-5, and right foraminal disc protrusion L2-3. Impression:   Lumbar HNP    Plan:    We discussed treatment options including observation, additional oral steroids, physical therapy, epidural injections and microlumbar discectomy. He wishes to proceed with an epidural injection. He may return to discuss possible microdiscectomy if symptoms persist after that.

## 2022-04-22 ENCOUNTER — HOSPITAL ENCOUNTER (OUTPATIENT)
Age: 42
Discharge: HOME | End: 2022-04-22
Payer: COMMERCIAL

## 2022-04-22 DIAGNOSIS — G89.29 CHRONIC LOW BACK PAIN, UNSPECIFIED BACK PAIN LATERALITY, UNSPECIFIED WHETHER SCIATICA PRESENT: ICD-10-CM

## 2022-04-22 DIAGNOSIS — M54.50 CHRONIC LOW BACK PAIN, UNSPECIFIED BACK PAIN LATERALITY, UNSPECIFIED WHETHER SCIATICA PRESENT: ICD-10-CM

## 2022-04-22 LAB
C-REACTIVE PROTEIN: <3 MG/L (ref 0–5.1)
RHEUMATOID FACTOR: <10 IU/ML

## 2022-04-22 PROCEDURE — 86431 RHEUMATOID FACTOR QUANT: CPT

## 2022-04-22 PROCEDURE — 86140 C-REACTIVE PROTEIN: CPT

## 2022-04-22 PROCEDURE — 86812 HLA TYPING A B OR C: CPT

## 2022-04-22 PROCEDURE — 86038 ANTINUCLEAR ANTIBODIES: CPT

## 2022-04-22 PROCEDURE — 36415 COLL VENOUS BLD VENIPUNCTURE: CPT

## 2022-04-23 LAB
ANTI-NUCLEAR ANTIBODY (ANA): NEGATIVE
HLA B27: POSITIVE

## 2022-04-25 ENCOUNTER — TELEPHONE (OUTPATIENT)
Dept: INTERNAL MEDICINE CLINIC | Age: 42
End: 2022-04-25

## 2022-04-25 DIAGNOSIS — G89.29 CHRONIC LOW BACK PAIN, UNSPECIFIED BACK PAIN LATERALITY, UNSPECIFIED WHETHER SCIATICA PRESENT: ICD-10-CM

## 2022-04-25 DIAGNOSIS — M54.50 CHRONIC LOW BACK PAIN, UNSPECIFIED BACK PAIN LATERALITY, UNSPECIFIED WHETHER SCIATICA PRESENT: ICD-10-CM

## 2022-04-25 DIAGNOSIS — L40.50 PSORIATIC ARTHRITIS (HCC): Primary | ICD-10-CM

## 2022-04-25 DIAGNOSIS — Z15.89 HLA B27 (HLA B27 POSITIVE): Primary | ICD-10-CM

## 2022-06-13 ENCOUNTER — OFFICE VISIT (OUTPATIENT)
Dept: RHEUMATOLOGY | Age: 42
End: 2022-06-13
Payer: COMMERCIAL

## 2022-06-13 VITALS
HEIGHT: 70 IN | DIASTOLIC BLOOD PRESSURE: 94 MMHG | WEIGHT: 310 LBS | BODY MASS INDEX: 44.38 KG/M2 | SYSTOLIC BLOOD PRESSURE: 142 MMHG

## 2022-06-13 DIAGNOSIS — M54.50 CHRONIC BILATERAL LOW BACK PAIN, UNSPECIFIED WHETHER SCIATICA PRESENT: ICD-10-CM

## 2022-06-13 DIAGNOSIS — Z15.89 HLA B27 (HLA B27 POSITIVE): Primary | ICD-10-CM

## 2022-06-13 DIAGNOSIS — G89.29 CHRONIC BILATERAL LOW BACK PAIN, UNSPECIFIED WHETHER SCIATICA PRESENT: ICD-10-CM

## 2022-06-13 PROCEDURE — 99204 OFFICE O/P NEW MOD 45 MIN: CPT | Performed by: INTERNAL MEDICINE

## 2022-06-13 NOTE — PROGRESS NOTES
[address], [place]                                                           [address] [place] [address] [phone] ([phone] (F)      Note is transcribed using voice recognition software. Inadvertent computerized transcription errors may be present. Patient identification: harjinder Cottrell : 1980,42 y.o. REASON FOR CONSULTATION:  Patient is being seen at the request of  MARGY Cruz CNP / MARGY Ornelas CNP for evaluation of joint pain, rule out PSA    HISTORY OF PRESENT ILLNESS:  36 y.o. male with history of chronic neck and low back pain-discogenic and from degenerative arthritis (MRI lumbar spine 2022), has had fine scaly skin in his eyebrows, scalp, behind ears, ankles-evaluated by dermatology in the past, diagnosed with eczema. His sister has skin psoriasis and psoriatic arthritis, therefore he was concerned about the possibility of the same. He is also HLA-B27 antigen positive. His symptoms include chronic stiffness in his neck, lower back with intermittent pain radiating from his right side of the neck to his entire right upper extremity. Low back pain-mechanical, without radiculopathic symptoms. Symptoms are worse with repetitive use. He also reports discomfort in his knees specially with weightbearing, walking. No other peripheral joint symptoms-denies swelling, persistent pain, stiffness in the joints. No dactylitis or enthesitis. He is currently taking diclofenac and tizanidine that helps with his symptoms. Denies any GI side effects. No other rashes, photosensitivity, GI or  symptoms, mucositis. All other ROS are negative.     PMH, PSH,Social history , Meds reviewed. FH- sister has skin Psoriasis and PsA    PHYSICAL EXAM:    Vitals:    BP (!) 142/94   Ht 5' 10\" (1.778 m)   Wt (!) 310 lb (140.6 kg)   BMI 44.48 kg/m²   AA)x3 well nourished, and well groomed, normal judgement. MKS: Bony crepitus in both knees, with full flexion and extension. Rest of the musculoskeletal exam is nonfocal without any tender, swollen, inflamed joints in upper or lower extremities. No nail pitting or nail changes that would suggest psoriasis. Toenails are curved with mild thickening. No focal spine tenderness. SLR full. Skin: Findings are consistent with seborrhea with seborrheic dermatitis of the scalp and forehead especially eyebrows bilaterally. No other rashes, no induration or skin thickening or nodules. HEENT: Normal lids, lacrimal glands and pupils. No oral or nasal ulcers. Salivary glands reveal no evidence of abnormality. External inspection of the ears and nose within normal limits. Neck: Supple no adenopathy. Chest: Normal effort  Heart:    Abdomen:   Lymph nodes:   Neurologic:     DATA:   Lab Results   Component Value Date    CRP <3.0 2022     No results found for: SEDRATE  MRI Result (most recent): MRI LUMBAR SPINE WO CONTRAST     Narrative  Site: [facility] [place] #: [id number] #: [id number] Procedure: MR Lumbar Spine w/o Contrast ; Reason for Exam: chronic right side low back pain with right sided sciatica  This document is confidential medical information. Unauthorized disclosure or use of this information is prohibited by law. If you are not the intended recipient of this document, please advise us by calling immediately [phone].     [facility]  SHAWN [address]      Patient Name: Tyesha Grubbs  Case ID: [id number]  Patient : 1980  Referring Physician: Maurice Mauro [facility]  Exam Date: 2022  Exam Description: MR Lumbar Spine w/o Contrast      HISTORY:  Chronic right side low back pain with right-sided sciatica. Right low back pain that  runs down right leg since summer 2021 - getting out of a chair. Treatment:  Oral steroids. No injections/surgery/cancer. TECHNICAL FACTORS:  Long- and short-axis fat- and water-weighted images were performed. COMPARISON:  None. FINDINGS:  No acute fracture identified. Alignment: Normal.    Vertebral Bodies: Normal in height. Marrow Signal: Small hemangioma within the L3 vertebral body. Otherwise, marrow signal is  within normal limits. Intervertebral Discs: Mild intervertebral disc space height loss at L2-L3, L3-L4 and L4-L5. Conus Medullaris:  Terminates at a normal level. Cauda Equina: Normal.    Paraspinal Soft Tissues: Normal.    Sterile osteochondrosis at T10-T11 and T11-T12. Straightening of the normal lumbar lordosis. 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 Individual Levels:    L1-L2: No focal disc herniation or foraminal narrowing. Mild facet arthropathy and left facet  capsulitis. L2-L3: 1-2mm of retrolisthesis of L2 on L3. Right foraminal disc protrusion in combination  with facet arthropathy results in moderate foraminal narrowing and encroachment upon the  exiting right L2 nerve root sleeve. Mild facet arthropathy and left facet capsulitis. L3-L4: Concentric disc displacement without canal stenosis or foraminal narrowing. Mild facet  arthropathy and bilateral facet capsulitis. L4-L5: Central and right paracentral subligamentous disc protrusion-type herniation results in  lateral recess stenosis and impingement upon the descending L5 nerve root sleeve. Moderate  facet arthropathy contributes to mild bilateral foraminal narrowing. Bilateral facet  capsulitis left greater than right. L5-S1: No focal disc herniation or foraminal narrowing. Moderate sclerotic and hypertrophic  facet arthropathy and left facet capsulitis. CONCLUSION:  1.  Dominant finding at L4-L5 with a central and right paracentral subligamentous disc  protrusion-type herniation impinging upon the descending L5 nerve root sleeve and likely  contributes the patient's symptomatology of radiculopathy. 2. At L2-L3, grade 1 retrolisthesis with a right foraminal disc protrusion in combination with  facet arthropathy resulting in moderate foraminal narrowing and encroachment upon the exiting  right L2 nerve root sleeve may also contribute to the patient's clinical syndrome. 3. Facet capsulitis at L1-L2 through L5-S1. Thank you for the opportunity to provide your interpretation. Dulce Sherwood MD    A: LOU/angus 01/05/2022 5:19 PM  T: ANGUS 01/05/2022 9:39 AM        ASSESSMENT AND PLAN:  Varinder Goel was seen today for establish care and joint pain. Diagnoses and all orders for this visit:    HLA B27 (HLA B27 positive)    Chronic bilateral low back pain, unspecified whether sciatica present    BMI 40.0-44.9, adult [facility])      Musculoskeletal symptoms-noninflammatory from osteoarthritis knees and discogenic/DJD changes in the spine. Recommend following up with orthopedics or primary care physician. Agree with diclofenac twice daily as needed. Weight reduction will likely to help with his symptoms as well. He is already seeing a weight management at [facility]. We also discussed about calorie count/calorie restriction, adequate hydration, motion, and good sleep. Explained the implications of PSH-H24 antigen discussed. Positive HLA-B27 itself does not indicate the presence of an autoimmune disease/spondyloarthropathy or the need for therapy. The prevalence in healthy individuals is about 3 - 10%. Given the lack of objective evidence-supporting specific clinical symptoms, other laboratory markers, it is likely that HLA-B27 is of no clinical significance at this time.   Explained common symptoms of psoriasis, psoriatic arthritis and spondylarthritis and patient is advised to call/ schedule visit with  me or primary care physician if any concerning symptoms are noted. #################################################################################################  Patient indicates understanding and agrees with the management plan. Total time 60 minutes that includes the following-  Preparing to see the patient such as reviewing patients records, pre-charting, preparing the visit on the same day, performing a medically appropriate history and physical examination, counseling and educating patient about diagnosis, management plan, ordering appropriate testings, prescriptions, communicating findings to other care providers, and documenting clinical information in electronic medical record. I thank you for giving me the opportunity to be involved in [address] [place] [address] and I look forward following Gordon Medeiros along with you. If you have any questions or concerns please feel free to contact me at any time.   Eveline Butler MD 06/13/22

## 2022-06-16 ENCOUNTER — TELEMEDICINE (OUTPATIENT)
Dept: INTERNAL MEDICINE CLINIC | Age: 42
End: 2022-06-16
Payer: COMMERCIAL

## 2022-06-16 DIAGNOSIS — U07.1 COVID: Primary | ICD-10-CM

## 2022-06-16 PROCEDURE — 99213 OFFICE O/P EST LOW 20 MIN: CPT | Performed by: NURSE PRACTITIONER

## 2022-06-16 RX ORDER — BENZONATATE 100 MG/1
100-200 CAPSULE ORAL 3 TIMES DAILY PRN
Qty: 90 CAPSULE | Refills: 0 | Status: SHIPPED | OUTPATIENT
Start: 2022-06-16 | End: 2022-06-23

## 2022-06-16 SDOH — ECONOMIC STABILITY: FOOD INSECURITY: WITHIN THE PAST 12 MONTHS, YOU WORRIED THAT YOUR FOOD WOULD RUN OUT BEFORE YOU GOT MONEY TO BUY MORE.: NEVER TRUE

## 2022-06-16 SDOH — ECONOMIC STABILITY: FOOD INSECURITY: WITHIN THE PAST 12 MONTHS, THE FOOD YOU BOUGHT JUST DIDN'T LAST AND YOU DIDN'T HAVE MONEY TO GET MORE.: NEVER TRUE

## 2022-06-16 ASSESSMENT — ANXIETY QUESTIONNAIRES
5. BEING SO RESTLESS THAT IT IS HARD TO SIT STILL: 0
2. NOT BEING ABLE TO STOP OR CONTROL WORRYING: 0
IF YOU CHECKED OFF ANY PROBLEMS ON THIS QUESTIONNAIRE, HOW DIFFICULT HAVE THESE PROBLEMS MADE IT FOR YOU TO DO YOUR WORK, TAKE CARE OF THINGS AT HOME, OR GET ALONG WITH OTHER PEOPLE: NOT DIFFICULT AT ALL
7. FEELING AFRAID AS IF SOMETHING AWFUL MIGHT HAPPEN: 0
4. TROUBLE RELAXING: 0
1. FEELING NERVOUS, ANXIOUS, OR ON EDGE: 0
6. BECOMING EASILY ANNOYED OR IRRITABLE: 0
3. WORRYING TOO MUCH ABOUT DIFFERENT THINGS: 0
GAD7 TOTAL SCORE: 0

## 2022-06-16 ASSESSMENT — ENCOUNTER SYMPTOMS
VOICE CHANGE: 0
NAUSEA: 0
SORE THROAT: 0
DIARRHEA: 0
SINUS PAIN: 0
RHINORRHEA: 1
WHEEZING: 0
SHORTNESS OF BREATH: 0
CONSTIPATION: 0
SINUS PRESSURE: 1
CHEST TIGHTNESS: 0
COUGH: 1
VOMITING: 0

## 2022-06-16 ASSESSMENT — PATIENT HEALTH QUESTIONNAIRE - PHQ9
SUM OF ALL RESPONSES TO PHQ QUESTIONS 1-9: 0
6. FEELING BAD ABOUT YOURSELF - OR THAT YOU ARE A FAILURE OR HAVE LET YOURSELF OR YOUR FAMILY DOWN: 0
1. LITTLE INTEREST OR PLEASURE IN DOING THINGS: 0
7. TROUBLE CONCENTRATING ON THINGS, SUCH AS READING THE NEWSPAPER OR WATCHING TELEVISION: 0
3. TROUBLE FALLING OR STAYING ASLEEP: 0
SUM OF ALL RESPONSES TO PHQ QUESTIONS 1-9: 0
10. IF YOU CHECKED OFF ANY PROBLEMS, HOW DIFFICULT HAVE THESE PROBLEMS MADE IT FOR YOU TO DO YOUR WORK, TAKE CARE OF THINGS AT HOME, OR GET ALONG WITH OTHER PEOPLE: 0
4. FEELING TIRED OR HAVING LITTLE ENERGY: 0
SUM OF ALL RESPONSES TO PHQ QUESTIONS 1-9: 0
SUM OF ALL RESPONSES TO PHQ QUESTIONS 1-9: 0
9. THOUGHTS THAT YOU WOULD BE BETTER OFF DEAD, OR OF HURTING YOURSELF: 0
SUM OF ALL RESPONSES TO PHQ9 QUESTIONS 1 & 2: 0
2. FEELING DOWN, DEPRESSED OR HOPELESS: 0
5. POOR APPETITE OR OVEREATING: 0
8. MOVING OR SPEAKING SO SLOWLY THAT OTHER PEOPLE COULD HAVE NOTICED. OR THE OPPOSITE, BEING SO FIGETY OR RESTLESS THAT YOU HAVE BEEN MOVING AROUND A LOT MORE THAN USUAL: 0

## 2022-06-16 ASSESSMENT — SOCIAL DETERMINANTS OF HEALTH (SDOH): HOW HARD IS IT FOR YOU TO PAY FOR THE VERY BASICS LIKE FOOD, HOUSING, MEDICAL CARE, AND HEATING?: NOT HARD AT ALL

## 2022-06-16 NOTE — PROGRESS NOTES
2022    TELEHEALTH EVALUATION -- Audio/Visual (During DTSOT-42 public health emergency)    HPI:    Brenna Red (:  1980) has requested an audio/video evaluation for the following concern(s):    Upper Respiratory Infection  Patient complains of symptoms of a URI. Symptoms include bilateral ear pain, coryza, cough, fever, sore throat, swollen glands, chest congestion and fatigue. Onset of symptoms was 2 days ago, gradually worsening since that time. He also c/o achiness and fever with Tmax to 100.5-101.9 for the past 2 days . He is drinking plenty of fluids. Evaluation to date: none. Treatment to date: oral decongestant, OTC cough suppressant, Acetaminophen, NSAID, rest, hydration. Review of Systems   Constitutional: Positive for activity change, fatigue and fever. HENT: Positive for congestion, postnasal drip, rhinorrhea and sinus pressure. Negative for sinus pain, sore throat and voice change. Respiratory: Positive for cough. Negative for chest tightness, shortness of breath and wheezing. Cardiovascular: Negative for chest pain and palpitations. Gastrointestinal: Negative for constipation, diarrhea, nausea and vomiting. Genitourinary: Negative for dysuria and urgency. Skin: Negative for rash. Neurological: Negative for dizziness, weakness and light-headedness. Hematological: Negative for adenopathy. Psychiatric/Behavioral: Negative for sleep disturbance. Prior to Visit Medications    Medication Sig Taking? Authorizing Provider   nirmatrelvir/ritonavir (PAXLOVID) 20 x 150 MG & 10 x 100MG Take 3 tablets (two 150 mg nirmatrelvir and one 100 mg ritonavir tablets) by mouth every 12 hours for 5 days.  Yes MARGY Velasquez CNP   benzonatate (TESSALON) 100 MG capsule Take 1-2 capsules by mouth 3 times daily as needed for Cough Yes MARGY Velasquez CNP   valsartan (DIOVAN) 80 MG tablet Take 1 tablet by mouth daily Yes MARGY Velasquez CNP   ondansetron (ZOFRAN-ODT) 4 ,   Family History   Problem Relation Age of Onset    High Blood Pressure Mother     High Cholesterol Mother     High Blood Pressure Father     High Cholesterol Father     Diabetes Father     High Blood Pressure Brother     High Blood Pressure Maternal Grandmother     Diabetes Maternal Grandmother     High Blood Pressure Maternal Grandfather     Heart Disease Maternal Grandfather     High Blood Pressure Paternal Grandfather     Heart Disease Paternal Grandfather    ,   Immunization History   Administered Date(s) Administered    Influenza Vaccine, unspecified formulation 11/09/2016    Influenza Virus Vaccine 10/29/2015, 10/15/2019, 10/11/2021    PPD Test 11/15/2017    Tdap (Boostrix, Adacel) 04/04/2020   ,   Health Maintenance   Topic Date Due    COVID-19 Vaccine (1) Never done    HIV screen  Never done    Hepatitis C screen  Never done    Diabetes screen  Never done    Depression Monitoring  12/15/2022    Lipids  05/04/2026    DTaP/Tdap/Td vaccine (2 - Td or Tdap) 04/04/2030    Flu vaccine  Completed    Hepatitis A vaccine  Aged Out    Hepatitis B vaccine  Aged Out    Hib vaccine  Aged Out    Meningococcal (ACWY) vaccine  Aged Out    Pneumococcal 0-64 years Vaccine  Aged Out         PHYSICAL EXAMINATION:  [ INSTRUCTIONS:  \"[x]\" Indicates a positive item  \"[]\" Indicates a negative item  -- DELETE ALL ITEMS NOT EXAMINED]  Vital Signs: (As obtained by patient/caregiver or practitioner observation)    Blood pressure-  Heart rate-    Respiratory rate-    Temperature-  Pulse oximetry-     Constitutional: [x] Appears well-developed and well-nourished [x] No apparent distress      [] Abnormal-   Mental status  [x] Alert and awake  [x] Oriented to person/place/time [x]Able to follow commands      Eyes:  EOM    [x]  Normal  [] Abnormal-  Sclera  [x]  Normal  [] Abnormal -         Discharge [x]  None visible  [] Abnormal -    HENT:   [x] Normocephalic, atraumatic.   [] Abnormal   [x] Mouth/Throat: Mucous membranes are moist.     External Ears [x] Normal  [] Abnormal-     Neck: [x] No visualized mass     Pulmonary/Chest: [x] Respiratory effort normal.  [x] No visualized signs of difficulty breathing or respiratory distress        [] Abnormal-      Musculoskeletal:   [x] Normal gait with no signs of ataxia         [x] Normal range of motion of neck        [] Abnormal-       Neurological:        [x] No Facial Asymmetry (Cranial nerve 7 motor function) (limited exam to video visit)          [] No gaze palsy        [] Abnormal-         Skin:        [x] No significant exanthematous lesions or discoloration noted on facial skin         [] Abnormal-            Psychiatric:       [x] Normal Affect [x] No Hallucinations        [] Abnormal-     Other pertinent observable physical exam findings-       ASSESSMENT/PLAN:    1. Dotty Huggins was seen today for other, cough, concern for covid-19, generalized body aches, fatigue, fever and congestion. Diagnoses and all orders for this visit:    COVID  -     nirmatrelvir/ritonavir (PAXLOVID) 20 x 150 MG & 10 x 100MG; Take 3 tablets (two 150 mg nirmatrelvir and one 100 mg ritonavir tablets) by mouth every 12 hours for 5 days. -     benzonatate (TESSALON) 100 MG capsule; Take 1-2 capsules by mouth 3 times daily as needed for Cough    Recommend paxlovid seeing risk factors. Encouraged robitussin/mucinex to assist with expectoration/cough suppression. Flonase to relieve sinus congestion. Warm fluids with honey, humidifier to assist in moistening of secretion recommended. Encouraged increased nutrition/hydration/rest while recovering. Return if symptoms worsen or fail to improve. Nico Osei is a 36 y.o. male being evaluated by a Virtual Visit (video visit) encounter to address concerns as mentioned above. A caregiver was present when appropriate.  Due to this being a TeleHealth encounter ([facility]), evaluation of the following organ systems was limited: Vitals/Constitutional/EENT/Resp/CV/GI//MS/Neuro/Skin/Heme-Lymph-Imm. Pursuant to the emergency declaration under the [address] [place] [address] authority and the [employer] Act, this Virtual Visit was conducted with patient's (and/or legal guardian's) consent, to reduce the patient's risk of exposure to COVID-19 and provide necessary medical care. The patient (and/or legal guardian) has also been advised to contact this office for worsening conditions or problems, and seek emergency medical treatment and/or call 911 if deemed necessary. Services were provided through a phone discussion virtually to substitute for in-person clinic visit. Patient and provider were located at their individual homes. Consent:  He and/or health care decision maker is aware that that he may receive a bill for this telephone service, depending on his insurance coverage, and has provided verbal consent to proceed: Yes    Total Time: minutes: 11-20 minutes    --MARGY Reddy CNP on 6/16/2022 at 12:53 PM    An electronic signature was used to authenticate this note.

## 2022-07-01 ENCOUNTER — OFFICE VISIT (OUTPATIENT)
Dept: INTERNAL MEDICINE CLINIC | Age: 42
End: 2022-07-01
Payer: COMMERCIAL

## 2022-07-01 VITALS
HEART RATE: 97 BPM | BODY MASS INDEX: 44.05 KG/M2 | OXYGEN SATURATION: 99 % | DIASTOLIC BLOOD PRESSURE: 80 MMHG | WEIGHT: 307 LBS | SYSTOLIC BLOOD PRESSURE: 128 MMHG | TEMPERATURE: 98.1 F

## 2022-07-01 DIAGNOSIS — E66.01 CLASS 3 SEVERE OBESITY DUE TO EXCESS CALORIES WITHOUT SERIOUS COMORBIDITY WITH BODY MASS INDEX (BMI) OF 40.0 TO 44.9 IN ADULT (HCC): ICD-10-CM

## 2022-07-01 DIAGNOSIS — G89.29 CHRONIC BILATERAL LOW BACK PAIN WITH RIGHT-SIDED SCIATICA: Primary | ICD-10-CM

## 2022-07-01 DIAGNOSIS — I10 PRIMARY HYPERTENSION: ICD-10-CM

## 2022-07-01 DIAGNOSIS — M54.41 CHRONIC BILATERAL LOW BACK PAIN WITH RIGHT-SIDED SCIATICA: Primary | ICD-10-CM

## 2022-07-01 DIAGNOSIS — E78.5 HYPERLIPIDEMIA, UNSPECIFIED HYPERLIPIDEMIA TYPE: ICD-10-CM

## 2022-07-01 DIAGNOSIS — E66.01 MORBID OBESITY WITH BMI OF 40.0-44.9, ADULT (HCC): ICD-10-CM

## 2022-07-01 PROBLEM — Z98.890 S/P ARTHROSCOPY OF SHOULDER: Status: RESOLVED | Noted: 2017-04-04 | Resolved: 2022-07-01

## 2022-07-01 PROBLEM — M76.821 POSTERIOR TIBIAL TENDINITIS OF RIGHT LEG: Status: RESOLVED | Noted: 2017-09-26 | Resolved: 2022-07-01

## 2022-07-01 PROCEDURE — 99214 OFFICE O/P EST MOD 30 MIN: CPT | Performed by: NURSE PRACTITIONER

## 2022-07-01 RX ORDER — PREDNISONE 10 MG/1
TABLET ORAL
Qty: 30 TABLET | Refills: 0 | Status: SHIPPED | OUTPATIENT
Start: 2022-07-01

## 2022-07-01 RX ORDER — METHOCARBAMOL 750 MG/1
750 TABLET, FILM COATED ORAL 4 TIMES DAILY
Qty: 40 TABLET | Refills: 0 | Status: SHIPPED | OUTPATIENT
Start: 2022-07-01 | End: 2022-07-11

## 2022-07-01 RX ORDER — NALTREXONE HYDROCHLORIDE 50 MG/1
12.5 TABLET, FILM COATED ORAL DAILY
Qty: 15 TABLET | Refills: 2 | Status: SHIPPED | OUTPATIENT
Start: 2022-07-01

## 2022-07-01 RX ORDER — DICLOFENAC SODIUM 75 MG/1
75 TABLET, DELAYED RELEASE ORAL 2 TIMES DAILY
Qty: 60 TABLET | Refills: 3 | Status: SHIPPED | OUTPATIENT
Start: 2022-07-01

## 2022-07-01 ASSESSMENT — ENCOUNTER SYMPTOMS
SHORTNESS OF BREATH: 0
COUGH: 0
DIARRHEA: 0
CHEST TIGHTNESS: 0
SORE THROAT: 0
SINUS PAIN: 0
NAUSEA: 0
VOMITING: 0
CONSTIPATION: 0
BACK PAIN: 1

## 2022-07-01 NOTE — PROGRESS NOTES
[address] [facility]  [address]  [facility] Mariann CARUSO is a 36 y.o. male who presents today for his medical conditions/complaints as noted below. Wooddeepak Valles III is c/o of Back Pain      Chief Complaint   Patient presents with    Back Pain       HPI:     Mr. Aundrea Dodge presents with concerns regarding acute on chronic low back pain which has been increasing in frequency and intensity over the past several months. He states this most recent encounter he began with pain after awaking early in the AM with a headache. He states he was nearly finished with his walk when his low back went into a spasm and locked. He states he has had to significantly modify his ADLs to function. Able to work, however must sit to perform epidurals. Unable to lift or bend without significant pain. He states he has multiple components of his pain including his typical sciatic presentation down the lateral right leg, however this occurrence he has noticed pain radiating inward toward the groin from around the low back. He has been treating with prescribed NSAIDs with little to no relief. Steroids have helped in the past. He has been through several courses of PT which has also helped. Currently sees chiropractor whom performs gentle adjustments. He is concerned that his weight is contributing to his recurrent back pain and would like to pursue medically assisted weight loss. He has a plan to begin working with dietary portion/calorie limiting to combat weight gain. When he is able he would like to begin exercising again as well. He would also like to transfer management of his lipids to this provider as his cardiologist largely only sees him yearly and has mentioned there isn't a particular need for regular cardiac care since levels have stabilized.        Past Medical History:   Diagnosis Date    Closed nondisplaced fracture of TABLET TWO TIMES A DAY 60 tablet 11    aspirin 81 MG EC tablet Take 81 mg by mouth daily       cetirizine (ZYRTEC) 10 MG tablet Take 10 mg by mouth daily       lansoprazole (PREVACID) 15 MG capsule Take 15 mg by mouth daily       atorvastatin (LIPITOR) 40 MG tablet Take 40 mg by mouth daily       No current facility-administered medications for this visit. Allergies   Allergen Reactions    Penicillins Hives    Versed [Midazolam]      Extreme intolerance- 2mg versed  Decreased heart rate /decreased BP and tinnitus  Combination with fentanyl  Vagal response       Subjective:      Review of Systems   Constitutional: Positive for unexpected weight change (Gain). Negative for fever. HENT: Negative for sinus pain and sore throat. Respiratory: Negative for cough, chest tightness and shortness of breath. Cardiovascular: Negative for chest pain and palpitations. Gastrointestinal: Negative for constipation, diarrhea, nausea and vomiting. Genitourinary: Negative for dysuria and urgency. Musculoskeletal: Positive for arthralgias, back pain, gait problem and myalgias. Skin: Negative for rash. Neurological: Negative for dizziness and weakness. Objective:     Vitals:    07/01/22 1445   BP: 128/80   Site: Right Upper Arm   Position: Sitting   Cuff Size: Medium Adult   Pulse: 97   Temp: 98.1 °F (36.7 °C)   TempSrc: Temporal   SpO2: 99%   Weight: (!) 307 lb (139.3 kg)       Physical Exam  Vitals and nursing note reviewed. Constitutional:       Appearance: Normal appearance. He is well-developed. HENT:      Head: Normocephalic and atraumatic. Right Ear: Hearing and external ear normal.      Left Ear: Hearing and external ear normal.      Nose: Nose normal.   Eyes:      General: Lids are normal.      Pupils: Pupils are equal, round, and reactive to light. Cardiovascular:      Rate and Rhythm: Normal rate. Pulses: Normal pulses.    Pulmonary:      Effort: Pulmonary effort is normal. No accessory muscle usage or respiratory distress. Abdominal:      General: Bowel sounds are normal.      Palpations: Abdomen is soft. Musculoskeletal:      Cervical back: Normal range of motion. Lumbar back: Spasms and tenderness present. Decreased range of motion. Skin:     General: Skin is warm and dry. Neurological:      Mental Status: He is alert. Psychiatric:         Speech: Speech normal.         Assessment & Plan: The following diagnoses and conditions are stable with no further orders unless indicated:    1. Chronic bilateral low back pain with right-sided sciatica    2. Class 3 severe obesity due to excess calories without serious comorbidity with body mass index (BMI) of 40.0 to 44.9 in adult ([facility] [address]    3. Primary hypertension    4. Hyperlipidemia, unspecified hyperlipidemia type    5. Morbid obesity with BMI [facility] 40.0-44.9, [facility]        Davey Porras was seen today for back pain. Diagnoses and all orders for this visit:    Chronic bilateral low back pain with right-sided sciatica  -     diclofenac (VOLTAREN) 75 MG EC tablet; Take 1 tablet by mouth 2 times daily  -     methocarbamol (ROBAXIN) 750 MG tablet; Take 1 tablet by mouth 4 times daily for 10 days  -     AFL - Christen Rehman MD, Neurosurgery, [facility]  -     predniSONE (DELTASONE) 10 MG tablet; Take 40 mg for 3 days then 30 mg for 3 days then 20 mg for 3 days then 10 mg for 3 days    Steroid taper with subsequent NSAIDs in the interim. Consider neurosurgery consult given recurrence/worsening. Class 3 severe obesity due to excess calories without serious comorbidity with body mass index (BMI) of 40.0 to 44.9 in adult ([facility])  -     naltrexone (DEPADE) 50 MG tablet; Take 0.25 tablets by mouth daily    Trial of contrave (combination with wellbutrin he is already taking) to assist with weight loss. Encouraged dietary improvement additionally to fortify medication response.  Will progress towards regular exercise once back improves. Key dietary points:     - Meats (preferably organic or grass fed) are great sources of protein and have no carbohydrates. - Recommend coconut, olive, avocado, or almond oils. - When buying dairy, choose regular or full fat options. - Choose vegetables that grow above ground as they are generally lower in carbohydrates and higher in fiber.  - Avoid starches such as bread, rice, potatoes, pasta and all sources of simple sugars (desserts, soda, breakfast cereals). - Choose beverages that are calorie and sugar free. Primary hypertension    Encouraged decreasing sodium in the diet, weight loss, and gradual increases in exercise at least 20 minutes daily to further benefit BP    Hyperlipidemia, unspecified hyperlipidemia type    Encouraged decreasing fatty, cholesterol rich foods in the diet (I.e. Red meats, butter, whole fat milk). Dietary choices like olive oil instead of butter, baked foods instead of fried can help to further prevent future elevations. Foods high in omega fatty acids can help to further decrease lipids additionally. Emphasis placed on incorporating fish, lean proteins (chicken, turkey, pork), fresh fruits and vegetables. Return in about 8 weeks (around 8/26/2022). Patientshould call the office immediately with new or ongoing signs or symptoms or worsening, or proceed to the emergency room. If you are on medications which could impair your senses, you are at risk of weakness, falls,dizziness, or drowsiness. You should be careful during activities which could place you at risk of harm, such as climbing, using stairs, operating machinery, or driving vehicles. If you feel you cannot safely do theseactivities, you should request others to help you, or avoid the activities altogether. If you are drowsy for any other reason, you should use the same precautions as listed above. Call if pattern of symptoms change or persists for an extended time.       Christine Castro

## 2022-07-17 ENCOUNTER — PATIENT MESSAGE (OUTPATIENT)
Dept: INTERNAL MEDICINE CLINIC | Age: 42
End: 2022-07-17

## 2022-07-18 NOTE — TELEPHONE ENCOUNTER
From: Derrick Bahena III  To: Dee Jay  Sent: 7/17/2022 7:54 PM EDT  Subject: [address],  I'm attempting to do the contrave dosing we talked about. Since I was already on Wellbutrin we decided to try just adding the Naltrexone. In order do properly dose this I need to cut the pill into fourths. Halfing them is not an issue but they literally fall apart when I attempt to cut them into fourths. There is no way it will be close to consistent dosing. Is it possible to switch to the contrave combination tablets?      Have a great week  Galen Delgado

## 2022-10-05 RX ORDER — VALSARTAN 80 MG/1
80 TABLET ORAL DAILY
Qty: 30 TABLET | Refills: 5 | Status: SHIPPED | OUTPATIENT
Start: 2022-10-05

## 2022-10-05 NOTE — TELEPHONE ENCOUNTER
Refill request for VALSARTAN medication.      [facility]      Last visit - 7/1/22     Pending visit - NONE    Last refill -1/17/22      Medication Contract signed -   Last Oarrs ran-         Additional Comments

## 2022-11-04 ENCOUNTER — OFFICE VISIT (OUTPATIENT)
Dept: INTERNAL MEDICINE CLINIC | Age: 42
End: 2022-11-04
Payer: COMMERCIAL

## 2022-11-04 VITALS
SYSTOLIC BLOOD PRESSURE: 126 MMHG | DIASTOLIC BLOOD PRESSURE: 80 MMHG | HEART RATE: 95 BPM | OXYGEN SATURATION: 95 % | BODY MASS INDEX: 43.19 KG/M2 | TEMPERATURE: 98.8 F | RESPIRATION RATE: 12 BRPM | WEIGHT: 301 LBS

## 2022-11-04 DIAGNOSIS — Z00.00 ENCOUNTER FOR WELL ADULT EXAM WITHOUT ABNORMAL FINDINGS: ICD-10-CM

## 2022-11-04 DIAGNOSIS — M54.50 LUMBAR BACK PAIN: ICD-10-CM

## 2022-11-04 DIAGNOSIS — E78.41 ELEVATED LIPOPROTEIN(A): ICD-10-CM

## 2022-11-04 DIAGNOSIS — Z23 NEED FOR INFLUENZA VACCINATION: Primary | ICD-10-CM

## 2022-11-04 DIAGNOSIS — I10 PRIMARY HYPERTENSION: ICD-10-CM

## 2022-11-04 DIAGNOSIS — E66.01 MORBID OBESITY WITH BMI OF 40.0-44.9, ADULT (HCC): ICD-10-CM

## 2022-11-04 PROCEDURE — 90674 CCIIV4 VAC NO PRSV 0.5 ML IM: CPT | Performed by: NURSE PRACTITIONER

## 2022-11-04 PROCEDURE — 99214 OFFICE O/P EST MOD 30 MIN: CPT | Performed by: NURSE PRACTITIONER

## 2022-11-04 PROCEDURE — 3074F SYST BP LT 130 MM HG: CPT | Performed by: NURSE PRACTITIONER

## 2022-11-04 PROCEDURE — 3078F DIAST BP <80 MM HG: CPT | Performed by: NURSE PRACTITIONER

## 2022-11-04 PROCEDURE — 90471 IMMUNIZATION ADMIN: CPT | Performed by: NURSE PRACTITIONER

## 2022-11-04 RX ORDER — GABAPENTIN 100 MG/1
100-200 CAPSULE ORAL NIGHTLY
Qty: 60 CAPSULE | Refills: 0 | Status: SHIPPED | OUTPATIENT
Start: 2022-11-04 | End: 2022-12-04

## 2022-11-04 RX ORDER — ATORVASTATIN CALCIUM 40 MG/1
40 TABLET, FILM COATED ORAL DAILY
Qty: 30 TABLET | Refills: 11 | Status: SHIPPED | OUTPATIENT
Start: 2022-11-04 | End: 2023-10-30

## 2022-11-04 RX ORDER — KETOCONAZOLE 20 MG/ML
SHAMPOO TOPICAL
COMMUNITY
Start: 2022-08-30

## 2022-11-04 RX ORDER — KETOCONAZOLE 20 MG/G
CREAM TOPICAL
COMMUNITY
Start: 2022-08-30

## 2022-11-04 ASSESSMENT — ENCOUNTER SYMPTOMS
SORE THROAT: 0
SINUS PAIN: 0
VOMITING: 0
COUGH: 0
CONSTIPATION: 0
SHORTNESS OF BREATH: 0
NAUSEA: 0
DIARRHEA: 0
CHEST TIGHTNESS: 0
BACK PAIN: 1

## 2022-11-04 NOTE — PROGRESS NOTES
[address] [facility] Internal Medicine  [address]  [facility] Mariann CARUSO is a 36 y.o. male who presents today for his medical conditions/complaints as noted below. Erwin Moses III is c/o of Lower Back Pain (Sciatica Pain)      Chief Complaint   Patient presents with    Lower Back Pain     Sciatica Pain       HPI:     Mr. Adrian Salgado presents with continued concerns regarding low back pain which has been worsening throughout time. He has seen a neurosurgeon whom has performed steroid injections. He continues with PT which has helped his symptoms, however due to the nature of his injury, he states the pain will continue unless surgically corrected. He does plan to pursue this in January. He states he currently experiences pain particularly in the evening as he lays to sleep. He will find himself awakening at approx 1-2 AM with muscle spasms, electric like shocks down his right leg. This will prevent him from obtaining regular sleep once it starts as well. He states he has had good results with taking contrave. States he has lost approx 10 pounds without even attempting to diet. He notices a curbing of his appetite which he feels he needed. He plans to continue exercising and work on his diet to improve his weight to reduce impact on his back as well. His lipitor prescription is stated to be stable. Denies myalgias, GI upset. Continues religiously in the evening.          Past Medical History:   Diagnosis Date    Closed nondisplaced fracture of neck of fifth metacarpal bone of right hand with routine healing 6/6/2016    GERD (gastroesophageal reflux disease)     HLA B27 (HLA B27 positive)     Hypertension     Otitis media 10/2/2014    Posterior tibial tendinitis of right leg 9/26/2017    S/P arthroscopy of shoulder 4/4/2017    Tendinitis of left rotator cuff 10/11/2016        Past Surgical History:   Procedure Laterality Date CHOLECYSTECTOMY      EYE SURGERY Left 1/2014    TPK    EYE SURGERY Right 3/2014    TPK    UPPER GASTROINTESTINAL ENDOSCOPY  7/9/2012    WISDOM TOOTH EXTRACTION         Family History   Problem Relation Age of Onset    High Blood Pressure Mother     High Cholesterol Mother     High Blood Pressure Father     High Cholesterol Father     Diabetes Father     High Blood Pressure Brother     High Blood Pressure Maternal Grandmother     Diabetes Maternal Grandmother     High Blood Pressure Maternal Grandfather     Heart Disease Maternal Grandfather     High Blood Pressure Paternal Grandfather     Heart Disease Paternal Grandfather        Social History     Tobacco Use    Smoking status: Never    Smokeless tobacco: Never   Substance Use Topics    Alcohol use: No        Current Outpatient Medications   Medication Sig Dispense Refill    ketoconazole (NIZORAL) 2 % shampoo Work into scalp, let sit a few minutes then rinse. Repeat 4 times per week. Bring suds down to face and ears. ketoconazole (NIZORAL) 2 % cream APPLY TOPICALLY TO FACE AND EARS DAILY      atorvastatin (LIPITOR) 40 MG tablet Take 1 tablet by mouth daily 30 tablet 11    gabapentin (NEURONTIN) 100 MG capsule Take 1-2 capsules by mouth nightly for 30 days.  60 capsule 0    valsartan (DIOVAN) 80 MG tablet Take 1 tablet by mouth daily 30 tablet 5    naltrexone-buPROPion (CONTRAVE) 8-90 MG per extended release tablet Take 2 tablets by mouth 2 times daily Start at 1 tablet BID and then increase to 2 tablets BID in 1-2 weeks as tolerated 120 tablet 2    diclofenac (VOLTAREN) 75 MG EC tablet Take 1 tablet by mouth 2 times daily 60 tablet 3    buPROPion (WELLBUTRIN) 75 MG tablet TAKE 1 TABLET TWO TIMES A DAY 60 tablet 11    aspirin 81 MG EC tablet Take 81 mg by mouth daily       cetirizine (ZYRTEC) 10 MG tablet Take 10 mg by mouth daily       lansoprazole (PREVACID) 15 MG delayed release capsule Take 15 mg by mouth daily       naltrexone (DEPADE) 50 MG tablet Take No carotid bruit or JVD. Cardiovascular:      Rate and Rhythm: Normal rate and regular rhythm. No extrasystoles are present. Chest Wall: PMI is not displaced. Pulses:           Radial pulses are 2+ on the right side and 2+ on the left side. Dorsalis pedis pulses are 2+ on the right side and 2+ on the left side. Heart sounds: Normal heart sounds, S1 normal and S2 normal. Heart sounds not distant. No murmur heard. No friction rub. No gallop. No S3 or S4 sounds. Pulmonary:      Effort: Pulmonary effort is normal. No respiratory distress. Breath sounds: Normal breath sounds. No decreased breath sounds, wheezing, rhonchi or rales. Abdominal:      General: Bowel sounds are normal. There is no distension. Palpations: Abdomen is soft. Tenderness: There is no abdominal tenderness. There is no rebound. Musculoskeletal:         General: Normal range of motion. Cervical back: Full passive range of motion without pain, normal range of motion and neck supple. Skin:     General: Skin is warm and dry. Neurological:      Cranial Nerves: No cranial nerve deficit. Psychiatric:         Speech: Speech normal.         Behavior: Behavior normal.         Thought Content: Thought content normal.         Judgment: Judgment normal.       Assessment & Plan: The following diagnoses and conditions are stable with no further orders unless indicated:    1. Need for influenza vaccination    2. Elevated lipoprotein(a)    3. Encounter for well adult exam without abnormal findings    4. Lumbar back pain    5. Morbid obesity with BMI of 40.0-44.9, adult ([facility] [address]    6. Primary hypertension        Pj Dawn was seen today for lower back pain. Diagnoses and all orders for this visit:    Need for influenza vaccination  -     Influenza, FLUCELVAX, (age 10 mo+), IM, Preservative Free, 0.5 mL    Elevated lipoprotein(a)  -     LIPID PANEL; Future  -     atorvastatin (LIPITOR) 40 MG tablet;  Take 1 tablet by mouth daily    Encouraged decreasing fatty, cholesterol rich foods in the diet (I.e. Red meats, butter, whole fat milk). Dietary choices like olive oil instead of butter, baked foods instead of fried can help to further prevent future elevations. Foods high in omega fatty acids can help to further decrease lipids additionally. Emphasis placed on incorporating fish, lean proteins (chicken, turkey, pork), fresh fruits and vegetables. Encounter for well adult exam without abnormal findings  -     LIPID PANEL; Future  -     CBC with Auto Differential; Future  -     Comprehensive Metabolic Panel; Future  -     Lipid Panel; Future  -     Hemoglobin A1C; Future    Discussed healthy lifestyle habits at length (encouraged regular exercise, healthy diet, no smoking, adequate sleep, sunscreen, safety items (car/driving, illness prevention.)    · A preventive eye exam performed by an eye specialist is recommended every 1-2 years to screen for glaucoma; cataracts, macular degeneration, and other eye disorders. · A preventive dental visit is recommended every 6 months. · Try to get at least 150 minutes of exercise per week or 10,000 steps per day on a pedometer . · You need 2153-0335 mg of calcium and 4188-3371 IU of vitamin D per day. It is possible to meet your calcium requirement with diet alone, but a vitamin D supplement is usually necessary to meet this goal.  · When exposed to the sun, use a sunscreen that protects against both UVA and UVB radiation with an SPF of 30 or greater. Reapply every 2 to 3 hours or after sweating, drying off with a towel, or swimming. · Always wear a seat belt when traveling in a car. Always wear a helmet when riding a bicycle or motorcycle. Lumbar back pain  -     gabapentin (NEURONTIN) 100 MG capsule; Take 1-2 capsules by mouth nightly for 30 days.  -     MRI LUMBAR SPINE WO CONTRAST; Future    Trial of gabapentin for evening time muscle spasms/worsening back pain.  Recommend starting low dose to avoid side effects. Can increase with time as needed. Continue with PT/neurosurg. MRI updated per neurosurg recs to evaluate if surgical intervention warranted      Return if symptoms worsen or fail to improve. Patientshould call the office immediately with new or ongoing signs or symptoms or worsening, or proceed to the emergency room. If you are on medications which could impair your senses, you are at risk of weakness, falls,dizziness, or drowsiness. You should be careful during activities which could place you at risk of harm, such as climbing, using stairs, operating machinery, or driving vehicles. If you feel you cannot safely do theseactivities, you should request others to help you, or avoid the activities altogether. If you are drowsy for any other reason, you should use the same precautions as listed above. Call if pattern of symptoms change or persists for an extended time.       Fredy Santillan

## 2022-12-15 ENCOUNTER — HOSPITAL ENCOUNTER (OUTPATIENT)
Dept: GENERAL RADIOLOGY | Age: 42
Discharge: HOME | End: 2022-12-15
Payer: COMMERCIAL

## 2022-12-15 ENCOUNTER — HOSPITAL ENCOUNTER (OUTPATIENT)
Age: 42
Discharge: HOME | End: 2022-12-15
Payer: COMMERCIAL

## 2022-12-15 DIAGNOSIS — M50.10 CERVICAL DISC DISORDER WITH RADICULOPATHY: ICD-10-CM

## 2022-12-15 PROCEDURE — 72040 X-RAY EXAM NECK SPINE 2-3 VW: CPT

## 2022-12-28 DIAGNOSIS — I10 ESSENTIAL HYPERTENSION: ICD-10-CM

## 2022-12-28 RX ORDER — LOSARTAN POTASSIUM 50 MG/1
50 TABLET ORAL DAILY
Qty: 30 TABLET | Refills: 5 | Status: CANCELLED | OUTPATIENT
Start: 2022-12-28

## 2022-12-28 RX ORDER — LOSARTAN POTASSIUM 25 MG/1
25 TABLET ORAL DAILY
Qty: 90 TABLET | Refills: 3 | OUTPATIENT
Start: 2022-12-28

## 2022-12-28 NOTE — TELEPHONE ENCOUNTER
Patient calling stating that the pharmacy is telling him that they are getting losartan not valsartan. The patient wants to know which one she should take because if he still takes the valsartan he has to change pharmacies. Patient is not the one calling, I called him to clarify what was going on because both Renee and Alejandro Warren have called requesting both medications.

## 2022-12-29 NOTE — TELEPHONE ENCOUNTER
Patient is supposed to be getting the medication through a mail pharmacy.  I there are any complications call [phone]

## 2023-01-10 ENCOUNTER — TELEPHONE (OUTPATIENT)
Dept: INTERNAL MEDICINE CLINIC | Age: 43
End: 2023-01-10

## 2023-01-10 RX ORDER — PROMETHAZINE HYDROCHLORIDE 25 MG/1
12.5-25 TABLET ORAL 3 TIMES DAILY PRN
Qty: 30 TABLET | Refills: 0 | Status: SHIPPED | OUTPATIENT
Start: 2023-01-10 | End: 2023-01-17

## 2023-01-10 NOTE — TELEPHONE ENCOUNTER
Phoned patient to let him know about the phenergan, which he has already picked up and started. He is drinking gatorade and water. He is also following the [employer].

## 2023-01-10 NOTE — TELEPHONE ENCOUNTER
Please call the patient and get more information on his symptoms. When did they start?, how many times has he vomited?, any diarrhea?,  and abdominal pain?, any fever? Is he able to keep any fluids down? When was the last time he was able to have something to drink that stayed down? Send this information to [place] along with the patient's medication question.   Thanks

## 2023-01-10 NOTE — TELEPHONE ENCOUNTER
Called and spoke with patient and he is feeling horrible. The Zofran he has at the house isn't working at all. He has been in bed since 7:00 a.m. this morning and resting. He hasn't really ate anything at all. He is sipping small amounts of gatorade and it is staying down right now. No fever, but having chills and complains that he is cold at times. Having some abdominal pain and has ongoing diarrhea since 8:00 p.m. yesterday evening. No vomiting yet, but there is times that he feels like he wants to. Having a headache and the only time he has got up is when he has to go to the bathroom. Patient is willing to do a Telephone Visit over the phone tomorrow, but wants something called into the pharmacy if it is possible.

## 2023-01-10 NOTE — TELEPHONE ENCOUNTER
Patient's wife, Mercy Mckenzie, called and stated patient has stomach bug. She said zofran does not help him, and she wanted to know if [facility] could be called in for patient. She is concerned that he may become dehydrated. [facility].  Patient can be reached at [phone]

## 2023-01-11 NOTE — TELEPHONE ENCOUNTER
Patient called with an update for [facility]    His nausea is gone and he is able to keep food down. He still has some minor diarrhea.

## 2023-01-24 ENCOUNTER — HOSPITAL ENCOUNTER (OUTPATIENT)
Age: 43
Discharge: HOME | End: 2023-01-28
Payer: COMMERCIAL

## 2023-01-24 LAB
ANION GAP SERPL CALCULATED.3IONS-SCNC: 11 MMOL/L (ref 3–16)
BASOPHILS ABSOLUTE: 0.1 K/UL (ref 0–0.2)
BASOPHILS RELATIVE PERCENT: 1.1 %
BUN BLDV-MCNC: 15 MG/DL (ref 7–20)
CALCIUM SERPL-MCNC: 9.4 MG/DL (ref 8.3–10.6)
CHLORIDE BLD-SCNC: 104 MMOL/L (ref 99–110)
CO2: 27 MMOL/L (ref 21–32)
CREAT SERPL-MCNC: 1.2 MG/DL (ref 0.9–1.3)
EKG ATRIAL RATE: 81 BPM
EKG DIAGNOSIS: NORMAL
EKG P AXIS: 29 DEGREES
EKG P-R INTERVAL: 146 MS
EKG Q-T INTERVAL: 396 MS
EKG QRS DURATION: 150 MS
EKG QTC CALCULATION (BAZETT): 460 MS
EKG R AXIS: 14 DEGREES
EKG T AXIS: 11 DEGREES
EKG VENTRICULAR RATE: 81 BPM
EOSINOPHILS ABSOLUTE: 0.3 K/UL (ref 0–0.6)
EOSINOPHILS RELATIVE PERCENT: 3.8 %
GFR SERPL CREATININE-BSD FRML MDRD: >60 ML/MIN/{1.73_M2}
GLUCOSE BLD-MCNC: 103 MG/DL (ref 70–99)
HCT VFR BLD CALC: 44.6 % (ref 40.5–52.5)
HEMOGLOBIN: 14.5 G/DL (ref 13.5–17.5)
INR BLD: 1 (ref 0.87–1.14)
LYMPHOCYTES ABSOLUTE: 1.9 K/UL (ref 1–5.1)
LYMPHOCYTES RELATIVE PERCENT: 27 %
MCH RBC QN AUTO: 26.1 PG (ref 26–34)
MCHC RBC AUTO-ENTMCNC: 32.4 G/DL (ref 31–36)
MCV RBC AUTO: 80.5 FL (ref 80–100)
MONOCYTES ABSOLUTE: 0.5 K/UL (ref 0–1.3)
MONOCYTES RELATIVE PERCENT: 7.7 %
NEUTROPHILS ABSOLUTE: 4.3 K/UL (ref 1.7–7.7)
NEUTROPHILS RELATIVE PERCENT: 60.4 %
PDW BLD-RTO: 15 % (ref 12.4–15.4)
PLATELET # BLD: 186 K/UL (ref 135–450)
PMV BLD AUTO: 9.3 FL (ref 5–10.5)
POTASSIUM SERPL-SCNC: 4.5 MMOL/L (ref 3.5–5.1)
PROTHROMBIN TIME: 13.1 SEC (ref 11.7–14.5)
RBC # BLD: 5.54 M/UL (ref 4.2–5.9)
SODIUM BLD-SCNC: 142 MMOL/L (ref 136–145)
WBC # BLD: 7 K/UL (ref 4–11)

## 2023-01-24 PROCEDURE — 80048 BASIC METABOLIC PNL TOTAL CA: CPT

## 2023-01-24 PROCEDURE — 93010 ELECTROCARDIOGRAM REPORT: CPT | Performed by: INTERNAL MEDICINE

## 2023-01-24 PROCEDURE — 36415 COLL VENOUS BLD VENIPUNCTURE: CPT

## 2023-01-24 PROCEDURE — 93005 ELECTROCARDIOGRAM TRACING: CPT | Performed by: NEUROLOGICAL SURGERY

## 2023-01-24 PROCEDURE — 85025 COMPLETE CBC W/AUTO DIFF WBC: CPT

## 2023-01-24 PROCEDURE — 85610 PROTHROMBIN TIME: CPT

## 2023-01-26 ENCOUNTER — OFFICE VISIT (OUTPATIENT)
Dept: INTERNAL MEDICINE CLINIC | Age: 43
End: 2023-01-26

## 2023-01-26 VITALS
OXYGEN SATURATION: 97 % | DIASTOLIC BLOOD PRESSURE: 82 MMHG | RESPIRATION RATE: 12 BRPM | BODY MASS INDEX: 44.43 KG/M2 | WEIGHT: 300 LBS | TEMPERATURE: 97.6 F | HEIGHT: 69 IN | HEART RATE: 114 BPM | SYSTOLIC BLOOD PRESSURE: 128 MMHG

## 2023-01-26 DIAGNOSIS — M51.26 LUMBAR DISC HERNIATION: ICD-10-CM

## 2023-01-26 DIAGNOSIS — M50.30 DDD (DEGENERATIVE DISC DISEASE), CERVICAL: Primary | ICD-10-CM

## 2023-01-26 ASSESSMENT — PATIENT HEALTH QUESTIONNAIRE - PHQ9
3. TROUBLE FALLING OR STAYING ASLEEP: 0
7. TROUBLE CONCENTRATING ON THINGS, SUCH AS READING THE NEWSPAPER OR WATCHING TELEVISION: 0
SUM OF ALL RESPONSES TO PHQ QUESTIONS 1-9: 0
2. FEELING DOWN, DEPRESSED OR HOPELESS: 0
9. THOUGHTS THAT YOU WOULD BE BETTER OFF DEAD, OR OF HURTING YOURSELF: 0
1. LITTLE INTEREST OR PLEASURE IN DOING THINGS: 0
6. FEELING BAD ABOUT YOURSELF - OR THAT YOU ARE A FAILURE OR HAVE LET YOURSELF OR YOUR FAMILY DOWN: 0
SUM OF ALL RESPONSES TO PHQ9 QUESTIONS 1 & 2: 0
4. FEELING TIRED OR HAVING LITTLE ENERGY: 0
5. POOR APPETITE OR OVEREATING: 0
8. MOVING OR SPEAKING SO SLOWLY THAT OTHER PEOPLE COULD HAVE NOTICED. OR THE OPPOSITE, BEING SO FIGETY OR RESTLESS THAT YOU HAVE BEEN MOVING AROUND A LOT MORE THAN USUAL: 0
SUM OF ALL RESPONSES TO PHQ QUESTIONS 1-9: 0
SUM OF ALL RESPONSES TO PHQ QUESTIONS 1-9: 0
10. IF YOU CHECKED OFF ANY PROBLEMS, HOW DIFFICULT HAVE THESE PROBLEMS MADE IT FOR YOU TO DO YOUR WORK, TAKE CARE OF THINGS AT HOME, OR GET ALONG WITH OTHER PEOPLE: 0
SUM OF ALL RESPONSES TO PHQ QUESTIONS 1-9: 0

## 2023-01-26 ASSESSMENT — ENCOUNTER SYMPTOMS
VOMITING: 0
NAUSEA: 0
COUGH: 0
CONSTIPATION: 0
SORE THROAT: 0
SINUS PAIN: 0
DIARRHEA: 0
SHORTNESS OF BREATH: 0
CHEST TIGHTNESS: 0

## 2023-01-26 ASSESSMENT — ANXIETY QUESTIONNAIRES
2. NOT BEING ABLE TO STOP OR CONTROL WORRYING: 0
1. FEELING NERVOUS, ANXIOUS, OR ON EDGE: 0
6. BECOMING EASILY ANNOYED OR IRRITABLE: 0
3. WORRYING TOO MUCH ABOUT DIFFERENT THINGS: 0
5. BEING SO RESTLESS THAT IT IS HARD TO SIT STILL: 0
7. FEELING AFRAID AS IF SOMETHING AWFUL MIGHT HAPPEN: 0
IF YOU CHECKED OFF ANY PROBLEMS ON THIS QUESTIONNAIRE, HOW DIFFICULT HAVE THESE PROBLEMS MADE IT FOR YOU TO DO YOUR WORK, TAKE CARE OF THINGS AT HOME, OR GET ALONG WITH OTHER PEOPLE: NOT DIFFICULT AT ALL
GAD7 TOTAL SCORE: 0
4. TROUBLE RELAXING: 0

## 2023-01-26 NOTE — PROGRESS NOTES
[facility] PHYSICIAN PRACTICES  [address] IM  [address] [address]  Dept: [phone]      Preoperative Consultation      Stuart Dixon III  YOB: 1980    Date of Service:  1/26/2023    Vitals:    01/26/23 0727   BP: 128/82   Site: Right Upper Arm   Position: Sitting   Cuff Size: Medium Adult   Pulse: (!) 114   Resp: 12   Temp: 97.6 °F (36.4 °C)   TempSrc: Temporal   SpO2: 97%   Weight: 300 lb (136.1 kg)   Height: 5' 9\" (1.753 m)      Wt Readings from Last 2 Encounters:   01/26/23 300 lb (136.1 kg)   11/04/22 (!) 301 lb (136.5 kg)     BP Readings from Last 3 Encounters:   01/26/23 128/82   11/04/22 126/80   07/01/22 128/80      Chief Complaint   Patient presents with    Pre-op Exam     Sx. 2-3-2022 for first surgery, Sx. 2- for the second surgery. Cervical Disc replacement. Dr. Jeraldene Gilford, [address] [place] [address], [address] [place] [address],[address]. Allergies   Allergen Reactions    Midazolam      Extreme intolerance- 2mg versed  Decreased heart rate /decreased BP and tinnitus  Combination with fentanyl  Vagal response    Penicillins Hives and Other (See Comments)     Outpatient Medications Marked as Taking for the 1/26/23 encounter (Office Visit) with Long Loja, APRN - CNP   Medication Sig Dispense Refill    ketoconazole (NIZORAL) 2 % shampoo Work into scalp, let sit a few minutes then rinse. Repeat 4 times per week. Bring suds down to face and ears.       ketoconazole (NIZORAL) 2 % cream APPLY TOPICALLY TO FACE AND EARS DAILY      atorvastatin (LIPITOR) 40 MG tablet Take 1 tablet by mouth daily 30 tablet 11    valsartan (DIOVAN) 80 MG tablet Take 1 tablet by mouth daily 30 tablet 5    naltrexone-buPROPion (CONTRAVE) 8-90 MG per extended release tablet Take 2 tablets by mouth 2 times daily Start at 1 tablet BID and then increase to 2 tablets BID in 1-2 weeks as tolerated 120 tablet 2    naltrexone (DEPADE) 50 MG tablet Take 0.25 tablets by mouth daily 15 tablet 2    diclofenac (VOLTAREN) 75 MG EC tablet Take 1 tablet by mouth 2 times daily 60 tablet 3    predniSONE (DELTASONE) 10 MG tablet Take 40 mg for 3 days then 30 mg for 3 days then 20 mg for 3 days then 10 mg for 3 days 30 tablet 0    ondansetron (ZOFRAN-ODT) 4 MG disintegrating tablet Take 1 tablet by mouth 3 times daily as needed for Nausea or Vomiting 21 tablet 0    buPROPion (WELLBUTRIN) 75 MG tablet TAKE 1 TABLET TWO TIMES A DAY 60 tablet 11    aspirin 81 MG EC tablet Take 81 mg by mouth daily       cetirizine (ZYRTEC) 10 MG tablet Take 10 mg by mouth daily       lansoprazole (PREVACID) 15 MG delayed release capsule Take 15 mg by mouth daily          This patient presents to the office today for a preoperative consultation at the request of surgeon, Dr. Melodie Samuels, who plans on performing Cervical Disc replacement on February 3/17 at [facility]. The current problem began several years ago, and symptoms have been worsening with time. Conservative therapy: Yes: NSAIDs, which has been not very effective. .    Planned anesthesia: General   Known anesthesia problems: None   Bleeding risk: No recent or remote history of abnormal bleeding  Personal or FH of DVT/PE: No    Patient objection to receiving blood products: No     Revised Cardiac Risk Index +1  +0    1.) High-Risk Surgery                                                               ? Intraperitoneal   ? Intrathoracic   ? Suprainguinal vascular      2.) History of ischemic heart disease                                              ? History of MI   ? History of positiveexercise test   ? Current chest paint considered due       to myocardial ischemia   ? Use of nitrate therapy   ? ECG with pathological Q waves      3.) History ofcongestive heart failure                            ? Pulmonary edema, bilateral rales or S3 gallop   ? Paroxysmal nocturnal dyspnea   ? CXR showing pulmonary vascular redistribution      4. )History of cerebrovascular disease ? Prior TIA or stroke      5.) Pre-operative insulin treatment                   6.) Pre-operative creatinine >2 mg/dL        []                 []                            []                      []           []       []   [x]                 [x]                               [x]                           [x]           [x]       [x]        1. Are you a loud and/or regular snorer? [x]  Yes      [] No     2. Have you been observed to gasp or stop breathing during sleep? []  Yes      [x] No     3. Do you feel tired or groggy upon awakening or do you awaken with a headache?                       []  Yes      [x] No     4. Are you often tired or fatigued during the wake time hours? []  Yes      [x] No     5. Do you fall asleep sitting, reading, watching TV or driving? []  Yes      [x] No     6. Do you often have problems with memory or concentration? []  Yes      [x] No     **If patient's score is ? 3 they are considered high risk for TAMY. Notify the anesthesiologist of the high risk and document in focus note. Note:  If the patient's BMI is more than 35 kg m¯² , has neck circumference > 40 cm, and/or high blood pressure the risk is greater (© American Sleep Apnea Association, 2006).     Patient Active Problem List   Diagnosis    HTN (hypertension)    Seasonal allergies    GERD (gastroesophageal reflux disease)    Schatzki's ring    Hyperlipidemia    Morbid obesity with BMI of 40.0-44.9, adult [facility])     Past Medical History:   Diagnosis Date    Closed nondisplaced fracture of neck of fifth metacarpal bone of right hand with routine healing 6/6/2016    GERD (gastroesophageal reflux disease)     HLA B27 (HLA B27 positive)     Hypertension     Otitis media 10/2/2014    Posterior tibial tendinitis of right leg 9/26/2017    S/P arthroscopy of shoulder 4/4/2017    Tendinitis of left rotator cuff 10/11/2016     Past Surgical History:   Procedure Laterality Date    CHOLECYSTECTOMY EYE SURGERY Left 1/2014    TPK    EYE SURGERY Right 3/2014    TPK    UPPER GASTROINTESTINAL ENDOSCOPY  7/9/2012    WISDOM TOOTH EXTRACTION       Family History   Problem Relation Age of Onset    High Blood Pressure Mother     High Cholesterol Mother     High Blood Pressure Father     High Cholesterol Father     Diabetes Father     High Blood Pressure Brother     High Blood Pressure Maternal Grandmother     Diabetes Maternal Grandmother     High Blood Pressure Maternal Grandfather     Heart Disease Maternal Grandfather     High Blood Pressure Paternal Grandfather     Heart Disease Paternal Grandfather      Social History     Socioeconomic History    Marital status:      Spouse name: Not on file    Number of children: Not on file    Years of education: Not on file    Highest education level: Not on file   Occupational History    Not on file   Tobacco Use    Smoking status: Never    Smokeless tobacco: Never   Substance and Sexual Activity    Alcohol use: No    Drug use: No    Sexual activity: Yes     Partners: Female   Other Topics Concern    Not on file   Social History Narrative    Not on file     Social Determinants of Health     Financial Resource Strain: Low Risk     Difficulty of Paying Living Expenses: Not hard at all   Food Insecurity: No Food Insecurity    Worried About Running Out of Food in the Last Year: Never true    Ran Out of Food in the Last Year: Never true   Transportation Needs: Not on file   Physical Activity: Not on file   Stress: Not on file   Social Connections: Not on file   Intimate Partner Violence: Not on file   Housing Stability: Not on file     Review of Systems   Constitutional:  Negative for fever. HENT:  Negative for sinus pain and sore throat. Respiratory:  Negative for cough, chest tightness and shortness of breath. Cardiovascular:  Negative for chest pain and palpitations. Gastrointestinal:  Negative for constipation, diarrhea, nausea and vomiting.    Genitourinary: Negative for dysuria and urgency. Skin:  Negative for rash. Neurological:  Negative for dizziness and weakness. All other systems were reviewed and are negative. Physical Exam  Constitutional:       Appearance: Normal appearance. He is well-developed. HENT:      Head: Normocephalic. Right Ear: Hearing and external ear normal.      Left Ear: Hearing and external ear normal.      Nose: Nose normal.   Eyes:      General: Lids are normal. Lids are everted, no foreign bodies appreciated. Conjunctiva/sclera: Conjunctivae normal.      Pupils: Pupils are equal, round, and reactive to light. Neck:      Thyroid: No thyroid mass. Vascular: Normal carotid pulses. No carotid bruit or JVD. Cardiovascular:      Rate and Rhythm: Normal rate and regular rhythm. No extrasystoles are present. Chest Wall: PMI is not displaced. Pulses:           Radial pulses are 2+ on the right side and 2+ on the left side. Dorsalis pedis pulses are 2+ on the right side and 2+ on the left side. Heart sounds: Normal heart sounds, S1 normal and S2 normal. Heart sounds not distant. No murmur heard. No friction rub. No gallop. No S3 or S4 sounds. Pulmonary:      Effort: Pulmonary effort is normal. No respiratory distress. Breath sounds: Normal breath sounds. No decreased breath sounds, wheezing, rhonchi or rales. Abdominal:      General: Bowel sounds are normal. There is no distension. Palpations: Abdomen is soft. Tenderness: There is no abdominal tenderness. There is no rebound. Musculoskeletal:         General: Normal range of motion. Cervical back: Full passive range of motion without pain, normal range of motion and neck supple. Skin:     General: Skin is warm and dry. Neurological:      Cranial Nerves: No cranial nerve deficit. Psychiatric:         Speech: Speech normal.         Behavior: Behavior normal.         Thought Content:  Thought content normal. Judgment: Judgment normal.       EKG Interpretation:  normal sinus rhythm, RBBB. Lab Review   Lab Results   Component Value Date/Time     01/24/2023 06:54 AM    K 4.5 01/24/2023 06:54 AM    K 5.8 11/28/2020 05:42 PM     01/24/2023 06:54 AM    CO2 27 01/24/2023 06:54 AM    BUN 15 01/24/2023 06:54 AM    CREATININE 1.2 01/24/2023 06:54 AM    GLUCOSE 103 01/24/2023 06:54 AM    CALCIUM 9.4 01/24/2023 06:54 AM     Lab Results   Component Value Date/Time    WBC 7.0 01/24/2023 06:54 AM    HGB 14.5 01/24/2023 06:54 AM    HCT 44.6 01/24/2023 06:54 AM    MCV 80.5 01/24/2023 06:54 AM     01/24/2023 06:54 AM         Assessment:       36 y.o. patient with planned surgery as above. Known risk factors for perioperative complications: Hypertension  Current medications which may produce withdrawal symptoms ifwithheld perioperatively: none     Plan: 1. Preoperative workup as follows: none  2. Change in medication regimen before surgery: Hold all medications on morning of surgery, Discontinue NSAIDs () 7 days before surgery, Ok to take Tylenol prior to surgery. 3. Prophylaxis for cardiac events with perioperative beta-blockers: Not indicated  ACC/AHA indications for pre-operative beta-blocker use:    Vascular surgery with history of postitive stress test  Intermediate or high risk surgery with history of CAD   Intermediate or high risk surgery with multiple clinical predictors of CAD- 2 of the following: history of compensated or prior heart failure, history ofcerebrovascular disease, DM, or renal insufficiency    Routine administration of higher-dose, long-acting metoprolol in beta-blocker-naïve patients on the day of surgery, and in the absence of dose titration is associated with an overall increase in mortality. Beta-blockers should be started days to weeks prior to surgery and titrated to pulse < 70.  4. Deep vein thrombosis prophylaxis: regimen to be chosen by surgical team  5.  No contraindications to planned surgery        MARGY Tomas - CNP

## 2023-02-11 ENCOUNTER — OFFICE VISIT (OUTPATIENT)
Dept: URGENT CARE | Age: 43
End: 2023-02-11

## 2023-02-11 VITALS
HEART RATE: 117 BPM | SYSTOLIC BLOOD PRESSURE: 154 MMHG | TEMPERATURE: 99.4 F | WEIGHT: 300.6 LBS | DIASTOLIC BLOOD PRESSURE: 87 MMHG | BODY MASS INDEX: 43.03 KG/M2 | OXYGEN SATURATION: 98 % | RESPIRATION RATE: 12 BRPM | HEIGHT: 70 IN

## 2023-02-11 DIAGNOSIS — J06.9 VIRAL URI: Primary | ICD-10-CM

## 2023-02-11 DIAGNOSIS — R03.0 ELEVATED BLOOD PRESSURE READING: ICD-10-CM

## 2023-02-11 DIAGNOSIS — I10 ESSENTIAL HYPERTENSION: ICD-10-CM

## 2023-02-11 DIAGNOSIS — J00 ACUTE RHINITIS: ICD-10-CM

## 2023-02-11 RX ORDER — PREDNISONE 20 MG/1
TABLET ORAL
Qty: 20 TABLET | Refills: 0 | Status: SHIPPED | OUTPATIENT
Start: 2023-02-11

## 2023-02-11 RX ORDER — FLUTICASONE PROPIONATE 50 MCG
1 SPRAY, SUSPENSION (ML) NASAL DAILY
Qty: 16 G | Refills: 0 | COMMUNITY
Start: 2023-02-11

## 2023-02-11 ASSESSMENT — ENCOUNTER SYMPTOMS
COUGH: 1
SORE THROAT: 1
GASTROINTESTINAL NEGATIVE: 1
SINUS PRESSURE: 1

## 2023-02-12 NOTE — PROGRESS NOTES
Adrienne Bowden (:  1980) is a 36 y.o. male,New patient, here for evaluation of the following chief complaint(s):  Congestion, Sinusitis (Started Monday - has Post Nasal Drip), and Pharyngitis      ASSESSMENT/PLAN:    ICD-10-CM    1. Viral URI  J06.9       2. Acute rhinitis  J00 predniSONE (DELTASONE) 20 MG tablet     fluticasone (FLONASE) 50 MCG/ACT nasal spray      3. Elevated blood pressure reading  R03.0       4. Essential hypertension  I10         Mucinex  DM every 12 hours  Stop the sudafed  Continue Zyrte  Call Monday if symptoms worsen  Return if symptoms worsen or fail to improve. Reviewed AVS with patient. All questions answered  Continue home medications  Discussed elevated blood pressure. Instructed to monitor BP daily and follow up with PCP as needed. SUBJECTIVE/OBJECTIVE:  36year old male presents with c/o PND and sinus congestion for 5 days. Denies known fever, chills or body aches. Has been treating with sudafed and mucinex and tylenol and ibuprofen - last taken 6 hours ago with mild effectiveness. He has known exposure to family with similar symptoms. States \"I have a cold\". History provided by:  Patient   used: No      Vitals:    23 1913 23 1940   BP: (!) 163/97 (!) 154/87   Site: Right Upper Arm    Pulse: (!) 117    Resp: 12    Temp: 99.4 °F (37.4 °C)    SpO2: 98%    Weight: (!) 300 lb 9.6 oz (136.4 kg)    Height: 5' 10\" (1.778 m)        Review of Systems   Constitutional:  Negative for appetite change, fatigue and fever. HENT:  Positive for congestion, ear pain, postnasal drip, sinus pressure and sore throat. Pale yellow mucoid    Respiratory:  Positive for cough. Dry hacking cough    Cardiovascular: Negative. Gastrointestinal: Negative. Neurological:  Positive for headaches. Intermittent HA      Physical Exam  Vitals reviewed. Constitutional:       General: He is not in acute distress.      Appearance: Normal appearance. He is obese. He is not ill-appearing, toxic-appearing or diaphoretic. HENT:      Head: Normocephalic. Right Ear: Tympanic membrane, ear canal and external ear normal. There is no impacted cerumen. Left Ear: Tympanic membrane, ear canal and external ear normal. There is no impacted cerumen. Nose: Nasal tenderness, mucosal edema and congestion present. No septal deviation or rhinorrhea. Right Turbinates: Not enlarged, swollen or pale. Left Turbinates: Not enlarged, swollen or pale. Right Sinus: Maxillary sinus tenderness present. No frontal sinus tenderness. Left Sinus: Maxillary sinus tenderness present. No frontal sinus tenderness. Mouth/Throat:      Mouth: Mucous membranes are moist.      Pharynx: Uvula midline. No pharyngeal swelling, oropharyngeal exudate or posterior oropharyngeal erythema. Tonsils: No tonsillar exudate or tonsillar abscesses. 0 on the right. 0 on the left. Eyes:      General:         Right eye: No discharge. Left eye: No discharge. Neck:      Vascular: No carotid bruit. Cardiovascular:      Rate and Rhythm: Normal rate and regular rhythm. Pulses: Normal pulses. Heart sounds: Normal heart sounds. No murmur heard. Pulmonary:      Effort: Pulmonary effort is normal.      Breath sounds: Normal breath sounds. Musculoskeletal:      Cervical back: Normal range of motion and neck supple. No rigidity or tenderness. Lymphadenopathy:      Cervical: No cervical adenopathy. Skin:     General: Skin is warm and dry. Neurological:      Mental Status: He is alert and oriented to person, place, and time. Psychiatric:         Behavior: Behavior normal.         An electronic signature was used to authenticate this note.     --Sudheer Archibald, MARGY - CNP

## 2023-02-13 ENCOUNTER — OFFICE VISIT (OUTPATIENT)
Dept: INTERNAL MEDICINE CLINIC | Age: 43
End: 2023-02-13
Payer: COMMERCIAL

## 2023-02-13 ENCOUNTER — HOSPITAL ENCOUNTER (OUTPATIENT)
Dept: GENERAL RADIOLOGY | Age: 43
Discharge: HOME | End: 2023-02-13
Payer: COMMERCIAL

## 2023-02-13 ENCOUNTER — HOSPITAL ENCOUNTER (OUTPATIENT)
Age: 43
Discharge: HOME | End: 2023-02-13
Payer: COMMERCIAL

## 2023-02-13 VITALS
TEMPERATURE: 98.2 F | RESPIRATION RATE: 14 BRPM | OXYGEN SATURATION: 97 % | WEIGHT: 302 LBS | HEART RATE: 106 BPM | SYSTOLIC BLOOD PRESSURE: 132 MMHG | BODY MASS INDEX: 43.33 KG/M2 | DIASTOLIC BLOOD PRESSURE: 80 MMHG

## 2023-02-13 DIAGNOSIS — J04.0 LARYNGITIS: Primary | ICD-10-CM

## 2023-02-13 DIAGNOSIS — M50.10 CERVICAL DISC DISORDER WITH RADICULOPATHY: ICD-10-CM

## 2023-02-13 PROCEDURE — 3075F SYST BP GE 130 - 139MM HG: CPT | Performed by: NURSE PRACTITIONER

## 2023-02-13 PROCEDURE — 3079F DIAST BP 80-89 MM HG: CPT | Performed by: NURSE PRACTITIONER

## 2023-02-13 PROCEDURE — 99213 OFFICE O/P EST LOW 20 MIN: CPT | Performed by: NURSE PRACTITIONER

## 2023-02-13 PROCEDURE — 72040 X-RAY EXAM NECK SPINE 2-3 VW: CPT

## 2023-02-13 RX ORDER — PIMECROLIMUS 10 MG/G
CREAM TOPICAL
COMMUNITY
Start: 2023-01-26

## 2023-02-13 RX ORDER — OXYCODONE HYDROCHLORIDE AND ACETAMINOPHEN 5; 325 MG/1; MG/1
TABLET ORAL
COMMUNITY
Start: 2023-02-03 | End: 2023-02-13

## 2023-02-13 RX ORDER — CEFUROXIME AXETIL 250 MG/1
250 TABLET ORAL 2 TIMES DAILY
Qty: 14 TABLET | Refills: 0 | Status: SHIPPED | OUTPATIENT
Start: 2023-02-13 | End: 2023-02-17 | Stop reason: SDUPTHER

## 2023-02-13 RX ORDER — CYCLOBENZAPRINE HCL 10 MG
TABLET ORAL
COMMUNITY
Start: 2023-02-03

## 2023-02-13 RX ORDER — OXYCODONE HYDROCHLORIDE AND ACETAMINOPHEN 5; 325 MG/1; MG/1
TABLET ORAL
COMMUNITY
Start: 2023-02-03

## 2023-02-13 RX ORDER — BENZONATATE 100 MG/1
100 CAPSULE ORAL 3 TIMES DAILY PRN
Qty: 42 CAPSULE | Refills: 0 | Status: SHIPPED | OUTPATIENT
Start: 2023-02-13 | End: 2023-02-17

## 2023-02-13 RX ORDER — DEXTROMETHORPHAN HYDROBROMIDE AND PROMETHAZINE HYDROCHLORIDE 15; 6.25 MG/5ML; MG/5ML
5 SYRUP ORAL 4 TIMES DAILY PRN
Qty: 80 ML | Refills: 0 | Status: SHIPPED | OUTPATIENT
Start: 2023-02-13 | End: 2023-02-20

## 2023-02-13 RX ORDER — CYCLOBENZAPRINE HCL 10 MG
TABLET ORAL
COMMUNITY
Start: 2023-02-03 | End: 2023-02-13

## 2023-02-13 SDOH — ECONOMIC STABILITY: INCOME INSECURITY: HOW HARD IS IT FOR YOU TO PAY FOR THE VERY BASICS LIKE FOOD, HOUSING, MEDICAL CARE, AND HEATING?: NOT HARD AT ALL

## 2023-02-13 SDOH — ECONOMIC STABILITY: HOUSING INSECURITY
IN THE LAST 12 MONTHS, WAS THERE A TIME WHEN YOU DID NOT HAVE A STEADY PLACE TO SLEEP OR SLEPT IN A SHELTER (INCLUDING NOW)?: NO

## 2023-02-13 SDOH — ECONOMIC STABILITY: FOOD INSECURITY: WITHIN THE PAST 12 MONTHS, THE FOOD YOU BOUGHT JUST DIDN'T LAST AND YOU DIDN'T HAVE MONEY TO GET MORE.: NEVER TRUE

## 2023-02-13 SDOH — ECONOMIC STABILITY: FOOD INSECURITY: WITHIN THE PAST 12 MONTHS, YOU WORRIED THAT YOUR FOOD WOULD RUN OUT BEFORE YOU GOT MONEY TO BUY MORE.: NEVER TRUE

## 2023-02-13 ASSESSMENT — ENCOUNTER SYMPTOMS
VOMITING: 0
SHORTNESS OF BREATH: 0
CONSTIPATION: 0
RHINORRHEA: 1
DIARRHEA: 0
CHEST TIGHTNESS: 0
SINUS PAIN: 1
NAUSEA: 0
SWOLLEN GLANDS: 1
COUGH: 1
SORE THROAT: 1

## 2023-02-13 NOTE — PROGRESS NOTES
[address] [facility]  [address]  [facility] Mariann CARUSO is a 36 y.o. male who presents today for his medical conditions/complaints as noted below. Judge Landau is c/o of URI      Chief Complaint   Patient presents with    URI       HPI:     URI   This is a new problem. The current episode started 1 to 4 weeks ago (~8 days, began after surgery recently, has upcoming surgery as well). The problem has been waxing and waning. There has been no fever. Associated symptoms include congestion, coughing, headaches, rhinorrhea, sinus pain, a sore throat and swollen glands. Pertinent negatives include no chest pain, diarrhea, dysuria, ear pain, nausea, neck pain, plugged ear sensation, rash or vomiting. He has tried acetaminophen and NSAIDs for the symptoms. The treatment provided mild relief.      Past Medical History:   Diagnosis Date    Closed nondisplaced fracture of neck of fifth metacarpal bone of right hand with routine healing 6/6/2016    GERD (gastroesophageal reflux disease)     HLA B27 (HLA B27 positive)     Hypertension     Otitis media 10/2/2014    Posterior tibial tendinitis of right leg 9/26/2017    S/P arthroscopy of shoulder 4/4/2017    Tendinitis of left rotator cuff 10/11/2016        Past Surgical History:   Procedure Laterality Date    CHOLECYSTECTOMY      EYE SURGERY Left 1/2014    TPK    EYE SURGERY Right 3/2014    TPK    UPPER GASTROINTESTINAL ENDOSCOPY  7/9/2012    WISDOM TOOTH EXTRACTION         Family History   Problem Relation Age of Onset    High Blood Pressure Mother     High Cholesterol Mother     High Blood Pressure Father     High Cholesterol Father     Diabetes Father     High Blood Pressure Brother     High Blood Pressure Maternal Grandmother     Diabetes Maternal Grandmother     High Blood Pressure Maternal Grandfather     Heart Disease Maternal Grandfather     High Blood Pressure Paternal Grandfather     Heart Disease Paternal Grandfather        Social History     Tobacco Use    Smoking status: Never    Smokeless tobacco: Never   Substance Use Topics    Alcohol use: No        Current Outpatient Medications   Medication Sig Dispense Refill    cyclobenzaprine (FLEXERIL) 10 MG tablet TAKE 1 TABLET BY MOUTH EVERY 8 HOURS AS NEEDED FOR MUSCLE SPASM      oxyCODONE-acetaminophen (PERCOCET) 5-325 MG per tablet TAKE 1 TABLET BY MOUTH EVERY 4 HOURS AS NEEDED FOR PAIN      pimecrolimus (ELIDEL) 1 % cream APPLY TO AFFECTED AREAS ON FACE ONE TO TWO TIMES PER DAY. NOT A STEROID, SAFE FOR DAILY USE.      cefUROXime (CEFTIN) 250 MG tablet Take 1 tablet by mouth 2 times daily for 7 days 14 tablet 0    promethazine-dextromethorphan (PROMETHAZINE-DM) 6.25-15 MG/5ML syrup Take 5 mLs by mouth 4 times daily as needed for Cough 80 mL 0    benzonatate (TESSALON) 100 MG capsule Take 1 capsule by mouth 3 times daily as needed for Cough 42 capsule 0    predniSONE (DELTASONE) 20 MG tablet Take 2 tabs daily for 5 days 20 tablet 0    fluticasone (FLONASE) 50 MCG/ACT nasal spray 1 spray by Each Nostril route daily 16 g 0    ketoconazole (NIZORAL) 2 % shampoo Work into scalp, let sit a few minutes then rinse. Repeat 4 times per week. Bring suds down to face and ears.       ketoconazole (NIZORAL) 2 % cream APPLY TOPICALLY TO FACE AND EARS DAILY      atorvastatin (LIPITOR) 40 MG tablet Take 1 tablet by mouth daily 30 tablet 11    valsartan (DIOVAN) 80 MG tablet Take 1 tablet by mouth daily 30 tablet 5    naltrexone-buPROPion (CONTRAVE) 8-90 MG per extended release tablet Take 2 tablets by mouth 2 times daily Start at 1 tablet BID and then increase to 2 tablets BID in 1-2 weeks as tolerated 120 tablet 2    naltrexone (DEPADE) 50 MG tablet Take 0.25 tablets by mouth daily 15 tablet 2    diclofenac (VOLTAREN) 75 MG EC tablet Take 1 tablet by mouth 2 times daily 60 tablet 3    ondansetron (ZOFRAN-ODT) 4 MG disintegrating tablet Take 1 tablet by mouth 3 times daily as needed for Nausea or Vomiting 21 tablet 0    buPROPion (WELLBUTRIN) 75 MG tablet TAKE 1 TABLET TWO TIMES A DAY 60 tablet 11    aspirin 81 MG EC tablet Take 81 mg by mouth daily      cetirizine (ZYRTEC) 10 MG tablet Take 10 mg by mouth daily       lansoprazole (PREVACID) 15 MG delayed release capsule Take 15 mg by mouth daily       gabapentin (NEURONTIN) 100 MG capsule Take 1-2 capsules by mouth nightly for 30 days. 60 capsule 0     No current facility-administered medications for this visit. Allergies   Allergen Reactions    Midazolam      Extreme intolerance- 2mg versed  Decreased heart rate /decreased BP and tinnitus  Combination with fentanyl  Vagal response    Penicillins Hives and Other (See Comments)       Subjective:      Review of Systems   Constitutional:  Negative for fever. HENT:  Positive for congestion, rhinorrhea, sinus pain and sore throat. Negative for ear pain. Respiratory:  Positive for cough. Negative for chest tightness and shortness of breath. Cardiovascular:  Negative for chest pain and palpitations. Gastrointestinal:  Negative for constipation, diarrhea, nausea and vomiting. Genitourinary:  Negative for dysuria and urgency. Musculoskeletal:  Negative for neck pain. Skin:  Negative for rash. Neurological:  Positive for headaches. Negative for dizziness and weakness. Objective:     Vitals:    02/13/23 1216   BP: 132/80   Site: Right Upper Arm   Position: Sitting   Cuff Size: Medium Adult   Pulse: (!) 106   Resp: 14   Temp: 98.2 °F (36.8 °C)   TempSrc: Temporal   SpO2: 97%   Weight: (!) 302 lb (137 kg)       Physical Exam  Constitutional:       Appearance: Normal appearance. He is well-developed. He is ill-appearing. HENT:      Head: Normocephalic. Right Ear: Hearing and external ear normal. No middle ear effusion. Left Ear: Hearing and external ear normal.  No middle ear effusion.       Nose: Nose normal. No mucosal edema.      Mouth/Throat:      Mouth: No oral lesions. Pharynx: Posterior oropharyngeal erythema present. No oropharyngeal exudate. Eyes:      General: Lids are normal. Lids are everted, no foreign bodies appreciated. Conjunctiva/sclera: Conjunctivae normal.      Pupils: Pupils are equal, round, and reactive to light. Neck:      Thyroid: No thyroid mass. Vascular: Normal carotid pulses. No carotid bruit or JVD. Cardiovascular:      Rate and Rhythm: Normal rate and regular rhythm. No extrasystoles are present. Chest Wall: PMI is not displaced. Pulses:           Radial pulses are 2+ on the right side and 2+ on the left side. Dorsalis pedis pulses are 2+ on the right side and 2+ on the left side. Heart sounds: Normal heart sounds, S1 normal and S2 normal. Heart sounds not distant. No murmur heard. No friction rub. No gallop. No S3 or S4 sounds. Pulmonary:      Effort: Pulmonary effort is normal. No respiratory distress. Breath sounds: Normal breath sounds. No decreased breath sounds, wheezing, rhonchi or rales. Abdominal:      General: Bowel sounds are normal. There is no distension. Palpations: Abdomen is soft. Tenderness: There is no abdominal tenderness. There is no rebound. Musculoskeletal:         General: Normal range of motion. Cervical back: Full passive range of motion without pain, normal range of motion and neck supple. Lymphadenopathy:      Head:      Right side of head: Submandibular and tonsillar adenopathy present. Left side of head: Submandibular and tonsillar adenopathy present. Skin:     General: Skin is warm and dry. Neurological:      Mental Status: He is alert and oriented to person, place, and time. Cranial Nerves: No cranial nerve deficit. Psychiatric:         Speech: Speech normal.         Behavior: Behavior normal.         Thought Content:  Thought content normal.         Judgment: Judgment normal. Assessment & Plan: The following diagnoses and conditions are stable with no further orders unless indicated:    1. Laryngitis        Sakina Taylor was seen today for uri. Diagnoses and all orders for this visit:    Laryngitis  -     cefUROXime (CEFTIN) 250 MG tablet; Take 1 tablet by mouth 2 times daily for 7 days  -     promethazine-dextromethorphan (PROMETHAZINE-DM) 6.25-15 MG/5ML syrup; Take 5 mLs by mouth 4 times daily as needed for Cough  -     benzonatate (TESSALON) 100 MG capsule; Take 1 capsule by mouth 3 times daily as needed for Cough    Encouraged robitussin/mucinex to assist with expectoration/cough suppression. Flonase to relieve sinus congestion. Warm fluids with honey, humidifier to assist in moistening of secretion recommended. Encouraged increased nutrition/hydration/rest while recovering. Seeing upcoming surgery will send abx to counter possible infectious risk. No follow-ups on file. Patientshould call the office immediately with new or ongoing signs or symptoms or worsening, or proceed to the emergency room. If you are on medications which could impair your senses, you are at risk of weakness, falls,dizziness, or drowsiness. You should be careful during activities which could place you at risk of harm, such as climbing, using stairs, operating machinery, or driving vehicles. If you feel you cannot safely do theseactivities, you should request others to help you, or avoid the activities altogether. If you are drowsy for any other reason, you should use the same precautions as listed above. Call if pattern of symptoms change or persists for an extended time.       Alta Malone

## 2023-02-17 ENCOUNTER — OFFICE VISIT (OUTPATIENT)
Dept: INTERNAL MEDICINE CLINIC | Age: 43
End: 2023-02-17
Payer: COMMERCIAL

## 2023-02-17 VITALS
OXYGEN SATURATION: 97 % | BODY MASS INDEX: 43.19 KG/M2 | TEMPERATURE: 97.3 F | SYSTOLIC BLOOD PRESSURE: 128 MMHG | DIASTOLIC BLOOD PRESSURE: 80 MMHG | WEIGHT: 301 LBS | HEART RATE: 121 BPM | RESPIRATION RATE: 14 BRPM

## 2023-02-17 DIAGNOSIS — J40 BRONCHITIS: Primary | ICD-10-CM

## 2023-02-17 DIAGNOSIS — J04.0 LARYNGITIS: ICD-10-CM

## 2023-02-17 PROCEDURE — 3074F SYST BP LT 130 MM HG: CPT | Performed by: NURSE PRACTITIONER

## 2023-02-17 PROCEDURE — 99213 OFFICE O/P EST LOW 20 MIN: CPT | Performed by: NURSE PRACTITIONER

## 2023-02-17 PROCEDURE — 3079F DIAST BP 80-89 MM HG: CPT | Performed by: NURSE PRACTITIONER

## 2023-02-17 RX ORDER — CEFUROXIME AXETIL 250 MG/1
250 TABLET ORAL 2 TIMES DAILY
Qty: 14 TABLET | Refills: 0 | Status: SHIPPED | OUTPATIENT
Start: 2023-02-20 | End: 2023-02-27

## 2023-02-17 RX ORDER — BENZONATATE 200 MG/1
200 CAPSULE ORAL 3 TIMES DAILY PRN
Qty: 30 CAPSULE | Refills: 0 | Status: SHIPPED | OUTPATIENT
Start: 2023-02-17 | End: 2023-02-24

## 2023-02-17 RX ORDER — ALBUTEROL SULFATE 90 UG/1
2 AEROSOL, METERED RESPIRATORY (INHALATION) EVERY 6 HOURS PRN
Qty: 18 G | Refills: 0 | Status: SHIPPED | OUTPATIENT
Start: 2023-02-17

## 2023-02-17 ASSESSMENT — ENCOUNTER SYMPTOMS
SORE THROAT: 1
SINUS PAIN: 0
VOICE CHANGE: 1
SINUS PRESSURE: 1
RHINORRHEA: 1
CHEST TIGHTNESS: 0
COUGH: 1
WHEEZING: 1
SHORTNESS OF BREATH: 1
VOMITING: 0
CONSTIPATION: 0
DIARRHEA: 0
NAUSEA: 0
HEMOPTYSIS: 0

## 2023-02-17 NOTE — PROGRESS NOTES
[facility] Internal Medicine  [address]  [facility] Mariann CARUSO is a 36 y.o. male who presents today for his medical conditions/complaints as noted below. Tae Angel III is c/o of Sinus Problem (Cough and sinus issues not improving)      Chief Complaint   Patient presents with    Sinus Problem     Cough and sinus issues not improving       HPI:     Cough  This is a new problem. The current episode started 1 to 4 weeks ago (10 days). The problem has been gradually worsening. The problem occurs constantly. The cough is Productive of sputum. Associated symptoms include chills, headaches, nasal congestion, postnasal drip, rhinorrhea, a sore throat, shortness of breath and wheezing. Pertinent negatives include no chest pain, ear congestion, ear pain, fever, hemoptysis, rash or sweats. Associated symptoms comments: Fatigue, poor sleep. The symptoms are aggravated by lying down and exercise. He has tried OTC cough suppressant, body position changes and prescription cough suppressant for the symptoms.      Past Medical History:   Diagnosis Date    Closed nondisplaced fracture of neck of fifth metacarpal bone of right hand with routine healing 6/6/2016    GERD (gastroesophageal reflux disease)     HLA B27 (HLA B27 positive)     Hypertension     Otitis media 10/2/2014    Posterior tibial tendinitis of right leg 9/26/2017    S/P arthroscopy of shoulder 4/4/2017    Tendinitis of left rotator cuff 10/11/2016        Past Surgical History:   Procedure Laterality Date    CHOLECYSTECTOMY      EYE SURGERY Left 1/2014    TPK    EYE SURGERY Right 3/2014    TPK    UPPER GASTROINTESTINAL ENDOSCOPY  7/9/2012    WISDOM TOOTH EXTRACTION         Family History   Problem Relation Age of Onset    High Blood Pressure Mother     High Cholesterol Mother     High Blood Pressure Father     High Cholesterol Father     Diabetes Father     High Blood Pressure Brother     High Blood Pressure Maternal Grandmother     Diabetes Maternal Grandmother     High Blood Pressure Maternal Grandfather     Heart Disease Maternal Grandfather     High Blood Pressure Paternal Grandfather     Heart Disease Paternal Grandfather        Social History     Tobacco Use    Smoking status: Never    Smokeless tobacco: Never   Substance Use Topics    Alcohol use: No        Current Outpatient Medications   Medication Sig Dispense Refill    albuterol sulfate HFA (PROVENTIL;VENTOLIN;PROAIR) 108 (90 Base) MCG/ACT inhaler Inhale 2 puffs into the lungs every 6 hours as needed for Wheezing 18 g 0    [START ON 2/20/2023] cefUROXime (CEFTIN) 250 MG tablet Take 1 tablet by mouth 2 times daily for 7 days 14 tablet 0    benzonatate (TESSALON) 200 MG capsule Take 1 capsule by mouth 3 times daily as needed for Cough 30 capsule 0    cyclobenzaprine (FLEXERIL) 10 MG tablet TAKE 1 TABLET BY MOUTH EVERY 8 HOURS AS NEEDED FOR MUSCLE SPASM      oxyCODONE-acetaminophen (PERCOCET) 5-325 MG per tablet TAKE 1 TABLET BY MOUTH EVERY 4 HOURS AS NEEDED FOR PAIN      pimecrolimus (ELIDEL) 1 % cream APPLY TO AFFECTED AREAS ON FACE ONE TO TWO TIMES PER DAY. NOT A STEROID, SAFE FOR DAILY USE.      promethazine-dextromethorphan (PROMETHAZINE-DM) 6.25-15 MG/5ML syrup Take 5 mLs by mouth 4 times daily as needed for Cough 80 mL 0    predniSONE (DELTASONE) 20 MG tablet Take 2 tabs daily for 5 days 20 tablet 0    fluticasone (FLONASE) 50 MCG/ACT nasal spray 1 spray by Each Nostril route daily 16 g 0    ketoconazole (NIZORAL) 2 % shampoo Work into scalp, let sit a few minutes then rinse. Repeat 4 times per week. Bring suds down to face and ears.       ketoconazole (NIZORAL) 2 % cream APPLY TOPICALLY TO FACE AND EARS DAILY      atorvastatin (LIPITOR) 40 MG tablet Take 1 tablet by mouth daily 30 tablet 11    valsartan (DIOVAN) 80 MG tablet Take 1 tablet by mouth daily 30 tablet 5    naltrexone-buPROPion (CONTRAVE) 8-90 MG per extended release tablet Take 2 tablets by mouth 2 times daily Start at 1 tablet BID and then increase to 2 tablets BID in 1-2 weeks as tolerated 120 tablet 2    naltrexone (DEPADE) 50 MG tablet Take 0.25 tablets by mouth daily 15 tablet 2    diclofenac (VOLTAREN) 75 MG EC tablet Take 1 tablet by mouth 2 times daily 60 tablet 3    ondansetron (ZOFRAN-ODT) 4 MG disintegrating tablet Take 1 tablet by mouth 3 times daily as needed for Nausea or Vomiting 21 tablet 0    buPROPion (WELLBUTRIN) 75 MG tablet TAKE 1 TABLET TWO TIMES A DAY 60 tablet 11    aspirin 81 MG EC tablet Take 81 mg by mouth daily      cetirizine (ZYRTEC) 10 MG tablet Take 10 mg by mouth daily       lansoprazole (PREVACID) 15 MG delayed release capsule Take 15 mg by mouth daily       gabapentin (NEURONTIN) 100 MG capsule Take 1-2 capsules by mouth nightly for 30 days. 60 capsule 0     No current facility-administered medications for this visit. Allergies   Allergen Reactions    Midazolam      Extreme intolerance- 2mg versed  Decreased heart rate /decreased BP and tinnitus  Combination with fentanyl  Vagal response    Penicillins Hives and Other (See Comments)       Subjective:      Review of Systems   Constitutional:  Positive for chills and fatigue. Negative for fever. HENT:  Positive for congestion, postnasal drip, rhinorrhea, sinus pressure, sore throat and voice change. Negative for ear pain and sinus pain. Respiratory:  Positive for cough, shortness of breath and wheezing. Negative for hemoptysis and chest tightness. Cardiovascular:  Negative for chest pain and palpitations. Gastrointestinal:  Negative for constipation, diarrhea, nausea and vomiting. Genitourinary:  Negative for dysuria and urgency. Skin:  Negative for rash. Neurological:  Positive for headaches. Negative for dizziness, weakness and light-headedness. Hematological:  Negative for adenopathy.      Objective:     Vitals:    02/17/23 1056   BP: 128/80   Site: Right Upper Arm   Position: Sitting   Cuff Size: Medium Adult   Pulse: (!) 121   Resp: 14   Temp: 97.3 °F (36.3 °C)   TempSrc: Temporal   SpO2: 97%   Weight: (!) 301 lb (136.5 kg)       Physical Exam  Vitals and nursing note reviewed. Constitutional:       Appearance: Normal appearance. He is well-developed. HENT:      Head: Normocephalic and atraumatic. Right Ear: Hearing and external ear normal.      Left Ear: Hearing and external ear normal.      Nose: Nose normal.   Eyes:      General: Lids are normal.      Pupils: Pupils are equal, round, and reactive to light. Cardiovascular:      Rate and Rhythm: Normal rate. Pulses: Normal pulses. Pulmonary:      Effort: Pulmonary effort is normal. No accessory muscle usage or respiratory distress. Breath sounds: Examination of the right-upper field reveals decreased breath sounds. Examination of the left-upper field reveals decreased breath sounds. Decreased breath sounds present. No wheezing or rhonchi. Abdominal:      General: Bowel sounds are normal.      Palpations: Abdomen is soft. Musculoskeletal:      Cervical back: Normal range of motion. Skin:     General: Skin is warm and dry. Neurological:      Mental Status: He is alert. Psychiatric:         Speech: Speech normal.       Assessment & Plan: The following diagnoses and conditions are stable with no further orders unless indicated:    1. Bronchitis    2. Misa Hodgson was seen today for sinus problem. Diagnoses and all orders for this visit:    Bronchitis  -     albuterol sulfate HFA (PROVENTIL;VENTOLIN;PROAIR) 108 (90 Base) MCG/ACT inhaler; Inhale 2 puffs into the lungs every 6 hours as needed for Wheezing  -     cefUROXime (CEFTIN) 250 MG tablet; Take 1 tablet by mouth 2 times daily for 7 days  -     benzonatate (TESSALON) 200 MG capsule; Take 1 capsule by mouth 3 times daily as needed for Cough    Laryngitis  -     cefUROXime (CEFTIN) 250 MG tablet;  Take 1 tablet by mouth 2 times daily for 7 days  -     benzonatate (TESSALON) 200 MG capsule; Take 1 capsule by mouth 3 times daily as needed for Cough    Symptoms suspicious for developing bronchitis/worsening URI. Continue abx. Recommend steroid to relieve inflammation of the airway. Can try albuterol as well. Encouraged robitussin/mucinex to assist with expectoration/cough suppression. Flonase to relieve sinus congestion. Warm fluids with honey, humidifier to assist in moistening of secretion recommended. Encouraged increased nutrition/hydration/rest while recovering. No follow-ups on file. Patientshould call the office immediately with new or ongoing signs or symptoms or worsening, or proceed to the emergency room. If you are on medications which could impair your senses, you are at risk of weakness, falls,dizziness, or drowsiness. You should be careful during activities which could place you at risk of harm, such as climbing, using stairs, operating machinery, or driving vehicles. If you feel you cannot safely do theseactivities, you should request others to help you, or avoid the activities altogether. If you are drowsy for any other reason, you should use the same precautions as listed above. Call if pattern of symptoms change or persists for an extended time.       Renetta Harden

## 2023-02-24 ENCOUNTER — TELEPHONE (OUTPATIENT)
Dept: INTERNAL MEDICINE CLINIC | Age: 43
End: 2023-02-24

## 2023-02-24 DIAGNOSIS — B37.2 YEAST DERMATITIS: Primary | ICD-10-CM

## 2023-02-24 RX ORDER — FLUCONAZOLE 150 MG/1
150 TABLET ORAL ONCE
Qty: 2 TABLET | Refills: 0 | Status: SHIPPED | OUTPATIENT
Start: 2023-02-24 | End: 2023-02-24

## 2023-02-24 RX ORDER — MICONAZOLE NITRATE 20 MG/G
POWDER TOPICAL
Qty: 45 G | Refills: 0 | Status: SHIPPED | OUTPATIENT
Start: 2023-02-24

## 2023-02-24 RX ORDER — FLUCONAZOLE 150 MG/1
150 TABLET ORAL ONCE
Qty: 1 TABLET | Refills: 0 | Status: SHIPPED | OUTPATIENT
Start: 2023-02-24 | End: 2023-02-24

## 2023-02-24 NOTE — TELEPHONE ENCOUNTER
Patient was in 2/17/23 and was prescribed medications at that visit. He has developed a yeast infection (groin) from the meds. He has raw skin; cannot bend or twist at the waist. Patient requesting an oral medication. Send to the [facility] in [facility].

## 2023-03-02 RX ORDER — CLOTRIMAZOLE AND BETAMETHASONE DIPROPIONATE 10; .64 MG/G; MG/G
CREAM TOPICAL
Qty: 45 G | Refills: 0 | Status: SHIPPED | OUTPATIENT
Start: 2023-03-02

## 2023-03-27 RX ORDER — VALSARTAN 80 MG/1
TABLET ORAL
Qty: 30 TABLET | Refills: 2 | Status: SHIPPED | OUTPATIENT
Start: 2023-03-27

## 2023-03-27 NOTE — TELEPHONE ENCOUNTER
Refill request for Valsartan 80mg Tablet medication.      Name of Jayashree Man      Last visit - 2-     Pending visit - N/A    Last refill - 3-      Medication Contract signed -   Last Phoebe benz-         Additional Comments

## 2023-04-06 RX ORDER — VALSARTAN 320 MG/1
320 TABLET ORAL DAILY
Qty: 30 TABLET | Refills: 0 | Status: SHIPPED | OUTPATIENT
Start: 2023-04-06 | End: 2023-04-11

## 2023-04-17 ENCOUNTER — HOSPITAL ENCOUNTER (OUTPATIENT)
Age: 43
Discharge: HOME | End: 2023-04-17
Payer: COMMERCIAL

## 2023-04-17 ENCOUNTER — HOSPITAL ENCOUNTER (OUTPATIENT)
Dept: GENERAL RADIOLOGY | Age: 43
Discharge: HOME | End: 2023-04-17
Payer: COMMERCIAL

## 2023-04-17 DIAGNOSIS — M50.10 CERVICAL DISC DISORDER WITH RADICULOPATHY: ICD-10-CM

## 2023-04-17 PROCEDURE — 72040 X-RAY EXAM NECK SPINE 2-3 VW: CPT

## 2023-04-21 DIAGNOSIS — E78.41 ELEVATED LIPOPROTEIN(A): ICD-10-CM

## 2023-04-21 RX ORDER — ATORVASTATIN CALCIUM 40 MG/1
40 TABLET, FILM COATED ORAL DAILY
Qty: 30 TABLET | Refills: 11 | Status: SHIPPED | OUTPATIENT
Start: 2023-04-21 | End: 2024-04-15

## 2023-04-21 NOTE — TELEPHONE ENCOUNTER
Refill request for Atorvastatin medication.      Name of [facility]      Last visit - 4/11/23     Pending visit - none    Last refill -11/4/22      Medication Contract signed -   Last Oarrs ran-         Additional Comments

## 2023-04-24 DIAGNOSIS — I10 PRIMARY HYPERTENSION: ICD-10-CM

## 2023-04-24 RX ORDER — LOSARTAN POTASSIUM AND HYDROCHLOROTHIAZIDE 25; 100 MG/1; MG/1
1 TABLET ORAL DAILY
Qty: 30 TABLET | Refills: 0 | Status: SHIPPED | OUTPATIENT
Start: 2023-04-24 | End: 2023-04-28

## 2023-04-27 ENCOUNTER — HOSPITAL ENCOUNTER (OUTPATIENT)
Age: 43
Discharge: HOME | End: 2023-04-27
Payer: COMMERCIAL

## 2023-04-27 ENCOUNTER — APPOINTMENT (OUTPATIENT)
Dept: CT IMAGING | Age: 43
End: 2023-04-27
Payer: COMMERCIAL

## 2023-04-27 ENCOUNTER — APPOINTMENT (OUTPATIENT)
Dept: GENERAL RADIOLOGY | Age: 43
End: 2023-04-27
Payer: COMMERCIAL

## 2023-04-27 ENCOUNTER — HOSPITAL ENCOUNTER (EMERGENCY)
Age: 43
Discharge: HOME | End: 2023-04-27
Attending: EMERGENCY MEDICINE
Payer: COMMERCIAL

## 2023-04-27 VITALS
RESPIRATION RATE: 16 BRPM | OXYGEN SATURATION: 98 % | SYSTOLIC BLOOD PRESSURE: 141 MMHG | HEART RATE: 88 BPM | TEMPERATURE: 97.8 F | BODY MASS INDEX: 43.67 KG/M2 | WEIGHT: 305 LBS | HEIGHT: 70 IN | DIASTOLIC BLOOD PRESSURE: 83 MMHG

## 2023-04-27 DIAGNOSIS — I95.1 ORTHOSTATIC HYPOTENSION: Primary | ICD-10-CM

## 2023-04-27 DIAGNOSIS — T88.7XXA MEDICATION SIDE EFFECT: ICD-10-CM

## 2023-04-27 LAB
ALBUMIN SERPL-MCNC: 4.6 G/DL (ref 3.4–5)
ALBUMIN/GLOB SERPL: 1.6 {RATIO} (ref 1.1–2.2)
ALP SERPL-CCNC: 98 U/L (ref 40–129)
ALT SERPL-CCNC: 61 U/L (ref 10–40)
ANION GAP SERPL CALCULATED.3IONS-SCNC: 13 MMOL/L (ref 3–16)
AST SERPL-CCNC: 34 U/L (ref 15–37)
BASOPHILS # BLD: 0.1 K/UL (ref 0–0.2)
BASOPHILS NFR BLD: 1.4 %
BILIRUB SERPL-MCNC: 0.4 MG/DL (ref 0–1)
BUN SERPL-MCNC: 18 MG/DL (ref 7–20)
CALCIUM SERPL-MCNC: 9.5 MG/DL (ref 8.3–10.6)
CHLORIDE SERPL-SCNC: 101 MMOL/L (ref 99–110)
CO2 SERPL-SCNC: 24 MMOL/L (ref 21–32)
CREAT SERPL-MCNC: 1.3 MG/DL (ref 0.9–1.3)
DEPRECATED RDW RBC AUTO: 14.8 % (ref 12.4–15.4)
EOSINOPHIL # BLD: 0.4 K/UL (ref 0–0.6)
EOSINOPHIL NFR BLD: 5.4 %
GFR SERPLBLD CREATININE-BSD FMLA CKD-EPI: >60 ML/MIN/{1.73_M2}
GLUCOSE SERPL-MCNC: 145 MG/DL (ref 70–99)
HCT VFR BLD AUTO: 44.7 % (ref 40.5–52.5)
HGB BLD-MCNC: 14.8 G/DL (ref 13.5–17.5)
LYMPHOCYTES # BLD: 1.8 K/UL (ref 1–5.1)
LYMPHOCYTES NFR BLD: 24.2 %
MCH RBC QN AUTO: 26.7 PG (ref 26–34)
MCHC RBC AUTO-ENTMCNC: 33 G/DL (ref 31–36)
MCV RBC AUTO: 80.8 FL (ref 80–100)
MONOCYTES # BLD: 0.4 K/UL (ref 0–1.3)
MONOCYTES NFR BLD: 5.8 %
NEUTROPHILS # BLD: 4.8 K/UL (ref 1.7–7.7)
NEUTROPHILS NFR BLD: 63.2 %
PLATELET # BLD AUTO: 190 K/UL (ref 135–450)
PMV BLD AUTO: 9.7 FL (ref 5–10.5)
POTASSIUM SERPL-SCNC: 4 MMOL/L (ref 3.5–5.1)
PROT SERPL-MCNC: 7.4 G/DL (ref 6.4–8.2)
RBC # BLD AUTO: 5.53 M/UL (ref 4.2–5.9)
SODIUM SERPL-SCNC: 138 MMOL/L (ref 136–145)
TROPONIN, HIGH SENSITIVITY: 11 NG/L (ref 0–22)
WBC # BLD AUTO: 7.7 K/UL (ref 4–11)

## 2023-04-27 PROCEDURE — 85025 COMPLETE CBC W/AUTO DIFF WBC: CPT

## 2023-04-27 PROCEDURE — 96360 HYDRATION IV INFUSION INIT: CPT

## 2023-04-27 PROCEDURE — 80053 COMPREHEN METABOLIC PANEL: CPT

## 2023-04-27 PROCEDURE — 71045 X-RAY EXAM CHEST 1 VIEW: CPT

## 2023-04-27 PROCEDURE — 99285 EMERGENCY DEPT VISIT HI MDM: CPT

## 2023-04-27 PROCEDURE — 70450 CT HEAD/BRAIN W/O DYE: CPT

## 2023-04-27 PROCEDURE — 86003 ALLG SPEC IGE CRUDE XTRC EA: CPT

## 2023-04-27 PROCEDURE — 84484 ASSAY OF TROPONIN QUANT: CPT

## 2023-04-27 PROCEDURE — 93005 ELECTROCARDIOGRAM TRACING: CPT | Performed by: EMERGENCY MEDICINE

## 2023-04-27 PROCEDURE — 2580000003 HC RX 258: Performed by: EMERGENCY MEDICINE

## 2023-04-27 RX ORDER — 0.9 % SODIUM CHLORIDE 0.9 %
1000 INTRAVENOUS SOLUTION INTRAVENOUS ONCE
Status: COMPLETED | OUTPATIENT
Start: 2023-04-27 | End: 2023-04-27

## 2023-04-27 RX ADMIN — SODIUM CHLORIDE 1000 ML: 9 INJECTION, SOLUTION INTRAVENOUS at 21:21

## 2023-04-28 ENCOUNTER — OFFICE VISIT (OUTPATIENT)
Dept: INTERNAL MEDICINE CLINIC | Age: 43
End: 2023-04-28
Payer: COMMERCIAL

## 2023-04-28 VITALS
WEIGHT: 295 LBS | OXYGEN SATURATION: 99 % | BODY MASS INDEX: 42.33 KG/M2 | SYSTOLIC BLOOD PRESSURE: 128 MMHG | RESPIRATION RATE: 12 BRPM | HEART RATE: 79 BPM | DIASTOLIC BLOOD PRESSURE: 80 MMHG | TEMPERATURE: 98.1 F

## 2023-04-28 DIAGNOSIS — I10 PRIMARY HYPERTENSION: Primary | ICD-10-CM

## 2023-04-28 LAB
EKG ATRIAL RATE: 88 BPM
EKG DIAGNOSIS: NORMAL
EKG P AXIS: 57 DEGREES
EKG P-R INTERVAL: 172 MS
EKG Q-T INTERVAL: 398 MS
EKG QRS DURATION: 154 MS
EKG QTC CALCULATION (BAZETT): 481 MS
EKG R AXIS: 10 DEGREES
EKG T AXIS: 27 DEGREES
EKG VENTRICULAR RATE: 88 BPM

## 2023-04-28 PROCEDURE — 99214 OFFICE O/P EST MOD 30 MIN: CPT | Performed by: NURSE PRACTITIONER

## 2023-04-28 PROCEDURE — 3079F DIAST BP 80-89 MM HG: CPT | Performed by: NURSE PRACTITIONER

## 2023-04-28 PROCEDURE — 3074F SYST BP LT 130 MM HG: CPT | Performed by: NURSE PRACTITIONER

## 2023-04-28 PROCEDURE — 93010 ELECTROCARDIOGRAM REPORT: CPT | Performed by: INTERNAL MEDICINE

## 2023-04-28 RX ORDER — AMLODIPINE BESYLATE 5 MG/1
5 TABLET ORAL DAILY
Qty: 30 TABLET | Refills: 0 | Status: SHIPPED | OUTPATIENT
Start: 2023-04-28

## 2023-04-28 RX ORDER — LOSARTAN POTASSIUM 100 MG/1
50-100 TABLET ORAL DAILY
Qty: 30 TABLET | Refills: 0 | Status: SHIPPED | OUTPATIENT
Start: 2023-04-28

## 2023-04-28 RX ORDER — VALSARTAN 80 MG/1
TABLET ORAL
COMMUNITY
Start: 2023-04-23 | End: 2023-04-28

## 2023-04-28 SDOH — ECONOMIC STABILITY: INCOME INSECURITY: HOW HARD IS IT FOR YOU TO PAY FOR THE VERY BASICS LIKE FOOD, HOUSING, MEDICAL CARE, AND HEATING?: NOT HARD AT ALL

## 2023-04-28 SDOH — ECONOMIC STABILITY: FOOD INSECURITY: WITHIN THE PAST 12 MONTHS, THE FOOD YOU BOUGHT JUST DIDN'T LAST AND YOU DIDN'T HAVE MONEY TO GET MORE.: NEVER TRUE

## 2023-04-28 SDOH — ECONOMIC STABILITY: FOOD INSECURITY: WITHIN THE PAST 12 MONTHS, YOU WORRIED THAT YOUR FOOD WOULD RUN OUT BEFORE YOU GOT MONEY TO BUY MORE.: NEVER TRUE

## 2023-04-28 ASSESSMENT — ENCOUNTER SYMPTOMS
VOMITING: 0
DIARRHEA: 0
CONSTIPATION: 0
NAUSEA: 0
SHORTNESS OF BREATH: 0
COUGH: 0
SINUS PAIN: 0
SORE THROAT: 0
CHEST TIGHTNESS: 0

## 2023-04-28 NOTE — ED PROVIDER NOTES
should he have any worsening of his symptoms. I am the Primary Clinician of Record. FINAL IMPRESSION      1. Orthostatic hypotension    2.  Medication side effect          DISPOSITION/PLAN     DISPOSITION Decision To Discharge 04/27/2023 10:18:17 PM      PATIENT REFERRED TO:  MARGY Hoffman - CNP  [address] [place] [address]    Schedule an appointment as soon as possible for a visit         DISCHARGE MEDICATIONS:  Discharge Medication List as of 4/27/2023 10:27 PM          DISCONTINUED MEDICATIONS:  Discharge Medication List as of 4/27/2023 10:27 PM                 (Please note that portions of this note were completed with a voice recognition program.  Efforts were made to edit the dictations but occasionally words are mis-transcribed.)    Nithya Montilla MD (electronically signed)          Nithya Montilla MD  04/27/23 7065

## 2023-04-28 NOTE — PROGRESS NOTES
imaging/testing from previous provider to more thoroughly evaluate current condition. This includes: ER provider notes, labs, EKG    Past Medical History:   Diagnosis Date    Closed nondisplaced fracture of neck of fifth metacarpal bone of right hand with routine healing 6/6/2016    GERD (gastroesophageal reflux disease)     HLA B27 (HLA B27 positive)     Hypertension     Otitis media 10/2/2014    Posterior tibial tendinitis of right leg 9/26/2017    S/P arthroscopy of shoulder 4/4/2017    Tendinitis of left rotator cuff 10/11/2016        Past Surgical History:   Procedure Laterality Date    CHOLECYSTECTOMY      EYE SURGERY Left 1/2014    TPK    EYE SURGERY Right 3/2014    TPK    UPPER GASTROINTESTINAL ENDOSCOPY  7/9/2012    WISDOM TOOTH EXTRACTION         Family History   Problem Relation Age of Onset    High Blood Pressure Mother     High Cholesterol Mother     High Blood Pressure Father     High Cholesterol Father     Diabetes Father     High Blood Pressure Brother     High Blood Pressure Maternal Grandmother     Diabetes Maternal Grandmother     High Blood Pressure Maternal Grandfather     Heart Disease Maternal Grandfather     High Blood Pressure Paternal Grandfather     Heart Disease Paternal Grandfather        Social History     Tobacco Use    Smoking status: Never    Smokeless tobacco: Never   Substance Use Topics    Alcohol use: No        Current Outpatient Medications   Medication Sig Dispense Refill    losartan (COZAAR) 100 MG tablet Take 0.5-1 tablets by mouth daily 30 tablet 0    amLODIPine (NORVASC) 5 MG tablet Take 1 tablet by mouth daily 30 tablet 0    atorvastatin (LIPITOR) 40 MG tablet Take 1 tablet by mouth daily 30 tablet 11    metFORMIN (GLUCOPHAGE) 1000 MG tablet Take 1 tablet by mouth 2 times daily (with meals)      clotrimazole-betamethasone (LOTRISONE) 1-0.05 % cream Apply topically 2 times daily. 45 g 0    miconazole (ZEASORB-AF) 2 % powder Apply topically 2 times daily.  45 g 0    albuterol

## 2023-04-29 LAB
DEPRECATED MISC ALLERGEN IGE RAST QL: NORMAL
MISCELLANEOUS LAB TEST ORDER: NORMAL
PINEAPPLE IGE QN: 0.74 KU/L

## 2023-05-22 ENCOUNTER — PATIENT MESSAGE (OUTPATIENT)
Dept: INTERNAL MEDICINE CLINIC | Age: 43
End: 2023-05-22

## 2023-05-22 RX ORDER — AMLODIPINE BESYLATE 5 MG/1
5 TABLET ORAL DAILY
Qty: 30 TABLET | Refills: 0 | Status: CANCELLED | OUTPATIENT
Start: 2023-05-22

## 2023-05-22 RX ORDER — AMLODIPINE BESYLATE 5 MG/1
5 TABLET ORAL DAILY
Qty: 30 TABLET | Refills: 0 | Status: SHIPPED | OUTPATIENT
Start: 2023-05-22

## 2023-05-22 NOTE — TELEPHONE ENCOUNTER
----- Message from Sheila Chase III sent at 5/22/2023  8:38 AM EDT -----  Regarding: Blood pressure meds  Contact: [phone]  Saul,  I have slowly ramped up my blood pressure meds over the last month. I had some of the Losartan 25mg tablets left from a previous adjustment of meds so I have used those too. Here is what I did:  Week 1: Amlodipine 5mg daily  Week 2: Amlodipine 5mg & Losartan 25mg daily  Week 3: Amlodipine 5mg & Losartan 50mg (100mg tab cut in half  Week 4 (this week): Amlodipine 5mg & Losartan 75mg (100mg tab cut in half plus 25mg)    My blood pressure is slowly reponding. This past week (week 3) my bp has been in 145-155/85-90 with a few systolic readings in the upper 130s. I deided to do it this way in an attempt to lower my bp slowly and not have any issues. I anticipate that I will ultimately end up with the Losartan dose being 100mg. I question whether or not we will need to increase the Amlodipine dose. Either way at the end of this week I will be out of the Amlodipine. Could I please have a refill on the Amlodipine? If you want to see me in the office or have a different plan I'm cool with that.     Have a great day,  Porsche Schaeffer

## 2023-05-22 NOTE — TELEPHONE ENCOUNTER
From: Regina Vaughn III  To: Haylee Janina  Sent: 5/22/2023 8:38 AM EDT  Subject: Blood pressure meds    Saul,  I have slowly ramped up my blood pressure meds over the last month. I had some of the Losartan 25mg tablets left from a previous adjustment of meds so I have used those too. Here is what I did:  Week 1: Amlodipine 5mg daily  Week 2: Amlodipine 5mg & Losartan 25mg daily  Week 3: Amlodipine 5mg & Losartan 50mg (100mg tab cut in half  Week 4 (this week): Amlodipine 5mg & Losartan 75mg (100mg tab cut in half plus 25mg)    My blood pressure is slowly reponding. This past week (week 3) my bp has been in 145-155/85-90 with a few systolic readings in the upper 130s. I deided to do it this way in an attempt to lower my bp slowly and not have any issues. I anticipate that I will ultimately end up with the Losartan dose being 100mg. I question whether or not we will need to increase the Amlodipine dose. Either way at the end of this week I will be out of the Amlodipine. Could I please have a refill on the Amlodipine? If you want to see me in the office or have a different plan I'm cool with that.     Have a great day,  Ligia Henderson

## 2023-05-22 NOTE — TELEPHONE ENCOUNTER
Refill request for AMLODIPINE medication.      Name of [address] [place] [address] [place]      Last visit - 4-     Pending visit - N/A    Last refill - 4-      Medication Contract signed -   Last Dong benz-         Additional Comments

## 2023-06-07 RX ORDER — LOSARTAN POTASSIUM 100 MG/1
50-100 TABLET ORAL DAILY
Qty: 30 TABLET | Refills: 2 | Status: SHIPPED | OUTPATIENT
Start: 2023-06-07

## 2023-06-07 RX ORDER — AMLODIPINE BESYLATE 5 MG/1
5-10 TABLET ORAL DAILY
Qty: 60 TABLET | Refills: 2 | Status: SHIPPED | OUTPATIENT
Start: 2023-06-07

## 2023-06-29 ENCOUNTER — OFFICE VISIT (OUTPATIENT)
Dept: INTERNAL MEDICINE CLINIC | Age: 43
End: 2023-06-29
Payer: COMMERCIAL

## 2023-06-29 VITALS
OXYGEN SATURATION: 96 % | HEART RATE: 61 BPM | SYSTOLIC BLOOD PRESSURE: 128 MMHG | DIASTOLIC BLOOD PRESSURE: 82 MMHG | RESPIRATION RATE: 12 BRPM | TEMPERATURE: 98.1 F | BODY MASS INDEX: 42.9 KG/M2 | WEIGHT: 299 LBS

## 2023-06-29 DIAGNOSIS — J01.90 ACUTE NON-RECURRENT SINUSITIS, UNSPECIFIED LOCATION: ICD-10-CM

## 2023-06-29 DIAGNOSIS — J06.9 UPPER RESPIRATORY TRACT INFECTION, UNSPECIFIED TYPE: Primary | ICD-10-CM

## 2023-06-29 LAB
INFLUENZA A ANTIBODY: NEGATIVE
INFLUENZA B ANTIBODY: NEGATIVE

## 2023-06-29 PROCEDURE — 3074F SYST BP LT 130 MM HG: CPT | Performed by: NURSE PRACTITIONER

## 2023-06-29 PROCEDURE — 99213 OFFICE O/P EST LOW 20 MIN: CPT | Performed by: NURSE PRACTITIONER

## 2023-06-29 PROCEDURE — 87804 INFLUENZA ASSAY W/OPTIC: CPT | Performed by: NURSE PRACTITIONER

## 2023-06-29 PROCEDURE — 3079F DIAST BP 80-89 MM HG: CPT | Performed by: NURSE PRACTITIONER

## 2023-06-29 RX ORDER — CEFDINIR 300 MG/1
300 CAPSULE ORAL 2 TIMES DAILY
Qty: 14 CAPSULE | Refills: 0 | Status: SHIPPED | OUTPATIENT
Start: 2023-06-29 | End: 2023-07-06

## 2023-06-29 RX ORDER — MELOXICAM 15 MG/1
15 TABLET ORAL DAILY
COMMUNITY
Start: 2023-06-01

## 2023-06-29 RX ORDER — EPINEPHRINE 0.3 MG/.3ML
INJECTION SUBCUTANEOUS
COMMUNITY
Start: 2023-05-05

## 2023-06-29 ASSESSMENT — ENCOUNTER SYMPTOMS
SINUS COMPLAINT: 1
SHORTNESS OF BREATH: 0
DIARRHEA: 0
SINUS PRESSURE: 1
COUGH: 1
WHEEZING: 0
SWOLLEN GLANDS: 1
VOMITING: 0
CHEST TIGHTNESS: 0
HOARSE VOICE: 1
SORE THROAT: 1
CONSTIPATION: 0
VOICE CHANGE: 1
SINUS PAIN: 0
NAUSEA: 0
RHINORRHEA: 1

## 2023-07-27 ENCOUNTER — OFFICE VISIT (OUTPATIENT)
Dept: INTERNAL MEDICINE CLINIC | Age: 43
End: 2023-07-27
Payer: COMMERCIAL

## 2023-07-27 VITALS
OXYGEN SATURATION: 95 % | HEART RATE: 100 BPM | TEMPERATURE: 97.9 F | DIASTOLIC BLOOD PRESSURE: 80 MMHG | BODY MASS INDEX: 43.62 KG/M2 | WEIGHT: 304 LBS | SYSTOLIC BLOOD PRESSURE: 138 MMHG | RESPIRATION RATE: 12 BRPM

## 2023-07-27 DIAGNOSIS — I10 PRIMARY HYPERTENSION: Primary | ICD-10-CM

## 2023-07-27 PROCEDURE — 3075F SYST BP GE 130 - 139MM HG: CPT | Performed by: NURSE PRACTITIONER

## 2023-07-27 PROCEDURE — 3079F DIAST BP 80-89 MM HG: CPT | Performed by: NURSE PRACTITIONER

## 2023-07-27 PROCEDURE — 99214 OFFICE O/P EST MOD 30 MIN: CPT | Performed by: NURSE PRACTITIONER

## 2023-07-27 RX ORDER — HYDROCHLOROTHIAZIDE 12.5 MG/1
12.5-25 CAPSULE, GELATIN COATED ORAL EVERY MORNING
Qty: 60 CAPSULE | Refills: 1 | Status: SHIPPED | OUTPATIENT
Start: 2023-07-27

## 2023-07-27 SDOH — ECONOMIC STABILITY: INCOME INSECURITY: HOW HARD IS IT FOR YOU TO PAY FOR THE VERY BASICS LIKE FOOD, HOUSING, MEDICAL CARE, AND HEATING?: NOT HARD AT ALL

## 2023-07-27 SDOH — ECONOMIC STABILITY: FOOD INSECURITY: WITHIN THE PAST 12 MONTHS, THE FOOD YOU BOUGHT JUST DIDN'T LAST AND YOU DIDN'T HAVE MONEY TO GET MORE.: NEVER TRUE

## 2023-07-27 SDOH — ECONOMIC STABILITY: FOOD INSECURITY: WITHIN THE PAST 12 MONTHS, YOU WORRIED THAT YOUR FOOD WOULD RUN OUT BEFORE YOU GOT MONEY TO BUY MORE.: NEVER TRUE

## 2023-07-27 ASSESSMENT — ENCOUNTER SYMPTOMS
SORE THROAT: 0
COUGH: 0
SINUS PAIN: 0
CONSTIPATION: 0
VOMITING: 0
SHORTNESS OF BREATH: 0
NAUSEA: 0
DIARRHEA: 0
CHEST TIGHTNESS: 0

## 2023-07-27 NOTE — PROGRESS NOTES
[facility] Internal Medicine  [facility], [address] [place] [address] SHADY is a 39 y.o. male who presents today for his medical conditions/complaints as noted below. Alexis Patiño is c/o of Hypertension and Edema      Chief Complaint   Patient presents with    Hypertension    Edema       HPI:     Mr. Sindy Nicole presents with concerns of edema in both ankles and feet and both lower legs. The edema has been mild. Onset of symptoms was several weeks ago, and patient reports symptoms have continued since that time. The edema is present in the afternoon, in the evening, at bedtime, and improves as he elevates his feet/extremities. The patient states the problem is long-standing. The swelling has been aggravated by dependency of involved area and use of calcium channel blockers. The swelling has been relieved by elevation of involved area, low-salt diet. Associated factors include: nothing. Cardiac risk factors include dyslipidemia, hypertension, male gender, and obesity (BMI >= 30 kg/m2). He has not attempted compression stockings as of this point.            Past Medical History:   Diagnosis Date    Closed nondisplaced fracture of neck of fifth metacarpal bone of right hand with routine healing 6/6/2016    GERD (gastroesophageal reflux disease)     HLA B27 (HLA B27 positive)     Hypertension     Otitis media 10/2/2014    Posterior tibial tendinitis of right leg 9/26/2017    S/P arthroscopy of shoulder 4/4/2017    Tendinitis of left rotator cuff 10/11/2016        Past Surgical History:   Procedure Laterality Date    CHOLECYSTECTOMY      EYE SURGERY Left 1/2014    TPK    EYE SURGERY Right 3/2014    TPK    UPPER GASTROINTESTINAL ENDOSCOPY  7/9/2012    WISDOM TOOTH EXTRACTION         Family History   Problem Relation Age of Onset    High Blood Pressure Mother     High Cholesterol Mother     High Blood Pressure Father     High Cholesterol

## 2023-08-31 ENCOUNTER — HOSPITAL ENCOUNTER (EMERGENCY)
Age: 43
Discharge: HOME | End: 2023-09-01
Attending: EMERGENCY MEDICINE
Payer: COMMERCIAL

## 2023-08-31 ENCOUNTER — APPOINTMENT (OUTPATIENT)
Dept: GENERAL RADIOLOGY | Age: 43
End: 2023-08-31
Payer: COMMERCIAL

## 2023-08-31 ENCOUNTER — OFFICE VISIT (OUTPATIENT)
Dept: INTERNAL MEDICINE CLINIC | Age: 43
End: 2023-08-31
Payer: COMMERCIAL

## 2023-08-31 VITALS
RESPIRATION RATE: 14 BRPM | BODY MASS INDEX: 43.62 KG/M2 | OXYGEN SATURATION: 95 % | HEART RATE: 65 BPM | WEIGHT: 304 LBS | SYSTOLIC BLOOD PRESSURE: 138 MMHG | TEMPERATURE: 97.9 F | DIASTOLIC BLOOD PRESSURE: 80 MMHG

## 2023-08-31 DIAGNOSIS — F41.9 ANXIETY: ICD-10-CM

## 2023-08-31 DIAGNOSIS — I10 HYPERTENSION, UNSPECIFIED TYPE: ICD-10-CM

## 2023-08-31 DIAGNOSIS — R07.9 CHEST PAIN, UNSPECIFIED TYPE: Primary | ICD-10-CM

## 2023-08-31 DIAGNOSIS — F41.9 ANXIETY: Primary | ICD-10-CM

## 2023-08-31 DIAGNOSIS — E66.01 MORBID OBESITY WITH BMI OF 40.0-44.9, ADULT (HCC): ICD-10-CM

## 2023-08-31 LAB
ALBUMIN SERPL-MCNC: 4.3 G/DL (ref 3.4–5)
ALBUMIN/GLOB SERPL: 1.5 {RATIO} (ref 1.1–2.2)
ALP SERPL-CCNC: 98 U/L (ref 40–129)
ALT SERPL-CCNC: 54 U/L (ref 10–40)
ANION GAP SERPL CALCULATED.3IONS-SCNC: 10 MMOL/L (ref 3–16)
AST SERPL-CCNC: 26 U/L (ref 15–37)
BASOPHILS # BLD: 0.1 K/UL (ref 0–0.2)
BASOPHILS NFR BLD: 1.4 %
BILIRUB SERPL-MCNC: 0.3 MG/DL (ref 0–1)
BUN SERPL-MCNC: 15 MG/DL (ref 7–20)
CALCIUM SERPL-MCNC: 8.9 MG/DL (ref 8.3–10.6)
CHLORIDE SERPL-SCNC: 103 MMOL/L (ref 99–110)
CO2 SERPL-SCNC: 28 MMOL/L (ref 21–32)
CREAT SERPL-MCNC: 1.1 MG/DL (ref 0.9–1.3)
DEPRECATED RDW RBC AUTO: 15.3 % (ref 12.4–15.4)
EOSINOPHIL # BLD: 0.2 K/UL (ref 0–0.6)
EOSINOPHIL NFR BLD: 3.2 %
GFR SERPLBLD CREATININE-BSD FMLA CKD-EPI: >60 ML/MIN/{1.73_M2}
GLUCOSE SERPL-MCNC: 162 MG/DL (ref 70–99)
HCT VFR BLD AUTO: 41.2 % (ref 40.5–52.5)
HGB BLD-MCNC: 14 G/DL (ref 13.5–17.5)
LYMPHOCYTES # BLD: 2.1 K/UL (ref 1–5.1)
LYMPHOCYTES NFR BLD: 29.7 %
MAGNESIUM SERPL-MCNC: 2 MG/DL (ref 1.8–2.4)
MCH RBC QN AUTO: 27.2 PG (ref 26–34)
MCHC RBC AUTO-ENTMCNC: 34 G/DL (ref 31–36)
MCV RBC AUTO: 80.1 FL (ref 80–100)
MONOCYTES # BLD: 0.6 K/UL (ref 0–1.3)
MONOCYTES NFR BLD: 8.5 %
NEUTROPHILS # BLD: 4.1 K/UL (ref 1.7–7.7)
NEUTROPHILS NFR BLD: 57.2 %
PLATELET # BLD AUTO: 177 K/UL (ref 135–450)
PMV BLD AUTO: 8.9 FL (ref 5–10.5)
POTASSIUM SERPL-SCNC: 3.5 MMOL/L (ref 3.5–5.1)
PROT SERPL-MCNC: 7.2 G/DL (ref 6.4–8.2)
RBC # BLD AUTO: 5.15 M/UL (ref 4.2–5.9)
SODIUM SERPL-SCNC: 141 MMOL/L (ref 136–145)
TROPONIN, HIGH SENSITIVITY: 12 NG/L (ref 0–22)
WBC # BLD AUTO: 7.1 K/UL (ref 4–11)

## 2023-08-31 PROCEDURE — 83735 ASSAY OF MAGNESIUM: CPT

## 2023-08-31 PROCEDURE — 93000 ELECTROCARDIOGRAM COMPLETE: CPT | Performed by: NURSE PRACTITIONER

## 2023-08-31 PROCEDURE — 99285 EMERGENCY DEPT VISIT HI MDM: CPT

## 2023-08-31 PROCEDURE — 85025 COMPLETE CBC W/AUTO DIFF WBC: CPT

## 2023-08-31 PROCEDURE — 3079F DIAST BP 80-89 MM HG: CPT | Performed by: NURSE PRACTITIONER

## 2023-08-31 PROCEDURE — 80053 COMPREHEN METABOLIC PANEL: CPT

## 2023-08-31 PROCEDURE — 71045 X-RAY EXAM CHEST 1 VIEW: CPT

## 2023-08-31 PROCEDURE — 93005 ELECTROCARDIOGRAM TRACING: CPT | Performed by: EMERGENCY MEDICINE

## 2023-08-31 PROCEDURE — 99214 OFFICE O/P EST MOD 30 MIN: CPT | Performed by: NURSE PRACTITIONER

## 2023-08-31 PROCEDURE — 3075F SYST BP GE 130 - 139MM HG: CPT | Performed by: NURSE PRACTITIONER

## 2023-08-31 PROCEDURE — 83880 ASSAY OF NATRIURETIC PEPTIDE: CPT

## 2023-08-31 PROCEDURE — 6370000000 HC RX 637 (ALT 250 FOR IP): Performed by: PHYSICIAN ASSISTANT

## 2023-08-31 PROCEDURE — 84484 ASSAY OF TROPONIN QUANT: CPT

## 2023-08-31 RX ORDER — ASPIRIN 325 MG
325 TABLET ORAL ONCE
Status: COMPLETED | OUTPATIENT
Start: 2023-08-31 | End: 2023-08-31

## 2023-08-31 RX ORDER — LOSARTAN POTASSIUM 100 MG/1
100 TABLET ORAL DAILY
Qty: 30 TABLET | Refills: 2 | Status: SHIPPED | OUTPATIENT
Start: 2023-08-31

## 2023-08-31 RX ORDER — AMLODIPINE BESYLATE 5 MG/1
5-10 TABLET ORAL DAILY
Qty: 60 TABLET | Refills: 2 | Status: SHIPPED | OUTPATIENT
Start: 2023-08-31

## 2023-08-31 RX ADMIN — ASPIRIN 325 MG: 325 TABLET ORAL at 22:47

## 2023-08-31 SDOH — ECONOMIC STABILITY: FOOD INSECURITY: WITHIN THE PAST 12 MONTHS, THE FOOD YOU BOUGHT JUST DIDN'T LAST AND YOU DIDN'T HAVE MONEY TO GET MORE.: NEVER TRUE

## 2023-08-31 SDOH — ECONOMIC STABILITY: FOOD INSECURITY: WITHIN THE PAST 12 MONTHS, YOU WORRIED THAT YOUR FOOD WOULD RUN OUT BEFORE YOU GOT MONEY TO BUY MORE.: NEVER TRUE

## 2023-08-31 SDOH — ECONOMIC STABILITY: INCOME INSECURITY: HOW HARD IS IT FOR YOU TO PAY FOR THE VERY BASICS LIKE FOOD, HOUSING, MEDICAL CARE, AND HEATING?: NOT HARD AT ALL

## 2023-08-31 ASSESSMENT — PATIENT HEALTH QUESTIONNAIRE - PHQ9
5. POOR APPETITE OR OVEREATING: 0
9. THOUGHTS THAT YOU WOULD BE BETTER OFF DEAD, OR OF HURTING YOURSELF: 0
SUM OF ALL RESPONSES TO PHQ9 QUESTIONS 1 & 2: 2
1. LITTLE INTEREST OR PLEASURE IN DOING THINGS: 1
SUM OF ALL RESPONSES TO PHQ QUESTIONS 1-9: 2
2. FEELING DOWN, DEPRESSED OR HOPELESS: 1
SUM OF ALL RESPONSES TO PHQ QUESTIONS 1-9: 2
6. FEELING BAD ABOUT YOURSELF - OR THAT YOU ARE A FAILURE OR HAVE LET YOURSELF OR YOUR FAMILY DOWN: 0
8. MOVING OR SPEAKING SO SLOWLY THAT OTHER PEOPLE COULD HAVE NOTICED. OR THE OPPOSITE, BEING SO FIGETY OR RESTLESS THAT YOU HAVE BEEN MOVING AROUND A LOT MORE THAN USUAL: 0
10. IF YOU CHECKED OFF ANY PROBLEMS, HOW DIFFICULT HAVE THESE PROBLEMS MADE IT FOR YOU TO DO YOUR WORK, TAKE CARE OF THINGS AT HOME, OR GET ALONG WITH OTHER PEOPLE: 0
3. TROUBLE FALLING OR STAYING ASLEEP: 0
SUM OF ALL RESPONSES TO PHQ QUESTIONS 1-9: 2
7. TROUBLE CONCENTRATING ON THINGS, SUCH AS READING THE NEWSPAPER OR WATCHING TELEVISION: 0
SUM OF ALL RESPONSES TO PHQ QUESTIONS 1-9: 2
4. FEELING TIRED OR HAVING LITTLE ENERGY: 0

## 2023-08-31 ASSESSMENT — PAIN - FUNCTIONAL ASSESSMENT: PAIN_FUNCTIONAL_ASSESSMENT: 0-10

## 2023-08-31 ASSESSMENT — ANXIETY QUESTIONNAIRES
5. BEING SO RESTLESS THAT IT IS HARD TO SIT STILL: 3
GAD7 TOTAL SCORE: 21
3. WORRYING TOO MUCH ABOUT DIFFERENT THINGS: 3
4. TROUBLE RELAXING: 3
IF YOU CHECKED OFF ANY PROBLEMS ON THIS QUESTIONNAIRE, HOW DIFFICULT HAVE THESE PROBLEMS MADE IT FOR YOU TO DO YOUR WORK, TAKE CARE OF THINGS AT HOME, OR GET ALONG WITH OTHER PEOPLE: VERY DIFFICULT
7. FEELING AFRAID AS IF SOMETHING AWFUL MIGHT HAPPEN: 3
2. NOT BEING ABLE TO STOP OR CONTROL WORRYING: 3
6. BECOMING EASILY ANNOYED OR IRRITABLE: 3
1. FEELING NERVOUS, ANXIOUS, OR ON EDGE: 3

## 2023-08-31 ASSESSMENT — ENCOUNTER SYMPTOMS
DIARRHEA: 0
CONSTIPATION: 0
VOMITING: 0
SINUS PAIN: 0
CHEST TIGHTNESS: 0
SORE THROAT: 0
NAUSEA: 0
COUGH: 0
SHORTNESS OF BREATH: 0

## 2023-08-31 ASSESSMENT — HEART SCORE: ECG: 1

## 2023-08-31 ASSESSMENT — PAIN SCALES - GENERAL: PAINLEVEL_OUTOF10: 5

## 2023-09-01 VITALS
RESPIRATION RATE: 16 BRPM | WEIGHT: 304 LBS | HEIGHT: 70 IN | SYSTOLIC BLOOD PRESSURE: 151 MMHG | HEART RATE: 72 BPM | OXYGEN SATURATION: 98 % | BODY MASS INDEX: 43.52 KG/M2 | DIASTOLIC BLOOD PRESSURE: 94 MMHG | TEMPERATURE: 98.1 F

## 2023-09-01 LAB
EKG ATRIAL RATE: 86 BPM
EKG DIAGNOSIS: NORMAL
EKG P AXIS: 41 DEGREES
EKG P-R INTERVAL: 142 MS
EKG Q-T INTERVAL: 394 MS
EKG QRS DURATION: 148 MS
EKG QTC CALCULATION (BAZETT): 471 MS
EKG R AXIS: 24 DEGREES
EKG T AXIS: 24 DEGREES
EKG VENTRICULAR RATE: 86 BPM
NT-PROBNP SERPL-MCNC: <36 PG/ML (ref 0–124)
TROPONIN, HIGH SENSITIVITY: 12 NG/L (ref 0–22)

## 2023-09-01 PROCEDURE — 96374 THER/PROPH/DIAG INJ IV PUSH: CPT

## 2023-09-01 PROCEDURE — 93010 ELECTROCARDIOGRAM REPORT: CPT | Performed by: INTERNAL MEDICINE

## 2023-09-01 PROCEDURE — 6360000002 HC RX W HCPCS: Performed by: EMERGENCY MEDICINE

## 2023-09-01 RX ORDER — KETOROLAC TROMETHAMINE 30 MG/ML
15 INJECTION, SOLUTION INTRAMUSCULAR; INTRAVENOUS ONCE
Status: COMPLETED | OUTPATIENT
Start: 2023-09-01 | End: 2023-09-01

## 2023-09-01 RX ADMIN — KETOROLAC TROMETHAMINE 15 MG: 30 INJECTION, SOLUTION INTRAMUSCULAR; INTRAVENOUS at 01:07

## 2023-09-01 ASSESSMENT — PAIN SCALES - GENERAL
PAINLEVEL_OUTOF10: 5
PAINLEVEL_OUTOF10: 3

## 2023-09-01 NOTE — ED NOTES
RN discussed discharge instructions with pt and family including follow up and medications. Pt and family verbalized understanding. All questions were answered. IV removed with no complications. Pt left stable and ambulatory with family and all belongings.         Blanquita James RN  09/01/23 [id number]

## 2023-09-01 NOTE — ED PROVIDER NOTES
[address] [place] [address]  ED  EMERGENCY DEPARTMENT ENCOUNTER        Pt Name: Chepe Kidd  MRN: [id number]  [address] [facility] 1980  Date of evaluation: 8/31/2023  Provider: Shin Jay PA-C  PCP: Ayesha Panchal, MARGY - CNP  ED Attending: Farhad Mendez MD       I have seen and evaluated this patient with my supervising physician Skip Galeana MD.    CHIEF COMPLAINT:     Chief Complaint   Patient presents with    Chest Pain     Few days of feeling \"sluggish. \" Slow onset mid sternal to L CP, intermittent w/ SOB w/ exertion. Went to PCP, EKG normal at PCP. 150/100 at home, ongoing w/ cardiology, appt 9-1-23. Increased stress w/ job. HISTORY OF PRESENT ILLNESS:      History provided by the patient. No limitations. Chepe Kidd is a 39 y.o. male who arrives to the ED by private vehicle. This patient states for the last few days he has been feeling very \"sluggish\". He describes mild, subtle substernal chest discomfort present at rest along with some dyspnea on exertion. He denies orthopnea or significant LE edema. The patient works as an obstetric nurse anesthetist and states his job has been very stressful. He tells me he had a tough case last week where they lost an infant. The patient admits that this incident affected him significantly and has caused him a lot of mental stress/agony. Due to the current stress he was unsure if the chest discomfort was truly chest pain or if it was more related to his anxiety. He went to his PCP and reportedly had a normal (unchanged) EKG in the office. He conveniently has an appointment with his cardiology nurse practitioner tomorrow. However, with his symptoms he wanted to be checked in the ED tonight. The patient is morbidly obese with a history of hypertension and hyperlipidemia. No known CAD. Nursing Notes were reviewed     REVIEW OF SYSTEMS:     Review of Systems  Positives and pertinent negatives as per HPI.       PAST MEDICAL HISTORY:     Past Medical History:   Diagnosis Date    Anxiety     Closed nondisplaced fracture of neck of fifth metacarpal bone of right hand with routine healing 06/06/2016    GERD (gastroesophageal reflux disease)     HLA B27 (HLA B27 positive)     Hypertension     Otitis media 10/02/2014    Posterior tibial tendinitis of right leg 09/26/2017    S/P arthroscopy of shoulder 04/04/2017    Tendinitis of left rotator cuff 10/11/2016       SURGICAL HISTORY:      Past Surgical History:   Procedure Laterality Date    CERVICAL DISC SURGERY  02/2023    C5    CHOLECYSTECTOMY      EYE SURGERY Left 01/01/2014    TPK    EYE SURGERY Right 03/01/2014    TPK    LUMBAR DISC SURGERY  02/2023    UPPER GASTROINTESTINAL ENDOSCOPY  07/09/2012    WISDOM TOOTH EXTRACTION         CURRENT MEDICATIONS:       Discharge Medication List as of 9/1/2023  1:40 AM        CONTINUE these medications which have NOT CHANGED    Details   sertraline (ZOLOFT) 50 MG tablet Take 1 tablet by mouth daily, Disp-30 tablet, R-0Normal      losartan (COZAAR) 100 MG tablet Take 1 tablet by mouth daily, Disp-30 tablet, R-2Normal      amLODIPine (NORVASC) 5 MG tablet Take 1-2 tablets by mouth daily, Disp-60 tablet, R-2Normal      hydroCHLOROthiazide (MICROZIDE) 12.5 MG capsule Take 1-2 capsules by mouth every morning, Disp-60 capsule, R-1Normal      meloxicam (MOBIC) 15 MG tablet Take 1 tablet by mouth dailyHistorical Med      EPINEPHrine (EPIPEN) 0.3 MG/0.3ML SOAJ injection INJECT AS NEEDED FOR SEVERE ALLERGIC REACTION INCLUDING ANAPHYLAXIS AS DIRECTED INTO UPPER OUTER THIGHHistorical Med      atorvastatin (LIPITOR) 40 MG tablet Take 1 tablet by mouth daily, Disp-30 tablet, R-11Normal      clotrimazole-betamethasone (LOTRISONE) 1-0.05 % cream Apply topically 2 times daily. , Disp-45 g, R-0, Normal      miconazole (ZEASORB-AF) 2 % powder Apply topically 2 times daily. , Disp-45 g, R-0Normal      albuterol sulfate HFA (PROVENTIL;VENTOLIN;PROAIR) 108 (90 Base) MCG/ACT inhaler Inhale 2

## 2023-09-18 ENCOUNTER — TELEPHONE (OUTPATIENT)
Dept: INTERNAL MEDICINE CLINIC | Age: 43
End: 2023-09-18

## 2023-09-18 NOTE — TELEPHONE ENCOUNTER
Patient's wife is calling about the medication for ADD and the patient has noticed that he is unable to complete projects at home, but he does fine at work. Cannot finish watching a TV show or a movie. FYI wife is going to schedule.

## 2023-09-20 ENCOUNTER — OFFICE VISIT (OUTPATIENT)
Dept: INTERNAL MEDICINE CLINIC | Age: 43
End: 2023-09-20
Payer: COMMERCIAL

## 2023-09-20 VITALS
RESPIRATION RATE: 12 BRPM | OXYGEN SATURATION: 98 % | SYSTOLIC BLOOD PRESSURE: 122 MMHG | HEART RATE: 82 BPM | WEIGHT: 299 LBS | BODY MASS INDEX: 42.9 KG/M2 | TEMPERATURE: 97.2 F | DIASTOLIC BLOOD PRESSURE: 74 MMHG

## 2023-09-20 DIAGNOSIS — F43.29 POST-TRAUMA RESPONSE: ICD-10-CM

## 2023-09-20 DIAGNOSIS — Z23 NEED FOR INFLUENZA VACCINATION: Primary | ICD-10-CM

## 2023-09-20 DIAGNOSIS — F41.9 ANXIETY: ICD-10-CM

## 2023-09-20 DIAGNOSIS — R41.840 ATTENTION DEFICIT: ICD-10-CM

## 2023-09-20 PROCEDURE — 99214 OFFICE O/P EST MOD 30 MIN: CPT | Performed by: NURSE PRACTITIONER

## 2023-09-20 PROCEDURE — 3074F SYST BP LT 130 MM HG: CPT | Performed by: NURSE PRACTITIONER

## 2023-09-20 PROCEDURE — 3078F DIAST BP <80 MM HG: CPT | Performed by: NURSE PRACTITIONER

## 2023-09-20 PROCEDURE — 90471 IMMUNIZATION ADMIN: CPT | Performed by: NURSE PRACTITIONER

## 2023-09-20 PROCEDURE — 90674 CCIIV4 VAC NO PRSV 0.5 ML IM: CPT | Performed by: NURSE PRACTITIONER

## 2023-09-20 RX ORDER — ATOMOXETINE 40 MG/1
40 CAPSULE ORAL DAILY
Qty: 30 CAPSULE | Refills: 1 | Status: SHIPPED | OUTPATIENT
Start: 2023-09-20 | End: 2023-11-19

## 2023-09-20 SDOH — ECONOMIC STABILITY: FOOD INSECURITY: WITHIN THE PAST 12 MONTHS, THE FOOD YOU BOUGHT JUST DIDN'T LAST AND YOU DIDN'T HAVE MONEY TO GET MORE.: NEVER TRUE

## 2023-09-20 SDOH — ECONOMIC STABILITY: FOOD INSECURITY: WITHIN THE PAST 12 MONTHS, YOU WORRIED THAT YOUR FOOD WOULD RUN OUT BEFORE YOU GOT MONEY TO BUY MORE.: NEVER TRUE

## 2023-09-20 SDOH — ECONOMIC STABILITY: INCOME INSECURITY: HOW HARD IS IT FOR YOU TO PAY FOR THE VERY BASICS LIKE FOOD, HOUSING, MEDICAL CARE, AND HEATING?: NOT HARD AT ALL

## 2023-09-20 ASSESSMENT — ENCOUNTER SYMPTOMS
CHEST TIGHTNESS: 0
SINUS PAIN: 0
SHORTNESS OF BREATH: 0
VOMITING: 0
NAUSEA: 0
DIARRHEA: 0
COUGH: 0
SORE THROAT: 0
CONSTIPATION: 0

## 2023-09-20 NOTE — PROGRESS NOTES
[address] [facility] Internal Medicine  [facility], [address] [facility]  [address] [place] [address] SHADY is a 39 y.o. male who presents today for his medical conditions/complaints as noted below. Anastasia Trujillo III is c/o of Discuss Medications (Discuss ADD medication)      Chief Complaint   Patient presents with    Discuss Medications     Discuss ADD medication       HPI:     ADD/ADHD Symptoms:  Patient complains of a several year(s) history of inattention, impulsivity, depressed mood, feelings of hopelessness, anxiety, including the following: fails to give close attention to details or makes careless mistakes in school, work, or other activities, has difficulty sustaining attention in tasks or play activities, has difficulty organizing tasks and activities, does not follow through on instructions and fails to finish schoolwork, chores, or duties in the workplace, loses things that are necessary for tasks and activities, is easily distracted by extraneous stimuli, and is often forgetful in daily activities. He also states his mood around romance and sex has decreased overall. He presents for evaluation of suspected ADD/ADHD. Patient denies hyperactivity. Symptoms have been gradually worsening with time. Family history of ADD/ADHD: no.  Previous treatment:has included: none. He states that since beginning zoloft he has noticed a much more flat attitude. His wife, who he presents with, also notices less enthusiasm.        Past Medical History:   Diagnosis Date    Anxiety     Closed nondisplaced fracture of neck of fifth metacarpal bone of right hand with routine healing 06/06/2016    GERD (gastroesophageal reflux disease)     HLA B27 (HLA B27 positive)     Hypertension     Otitis media 10/02/2014    Posterior tibial tendinitis of right leg 09/26/2017    S/P arthroscopy of shoulder 04/04/2017    Tendinitis of left rotator cuff 10/11/2016        Past Surgical

## 2023-10-26 ENCOUNTER — HOSPITAL ENCOUNTER (INPATIENT)
Age: 43
LOS: 2 days | Discharge: HOME | DRG: 920 | End: 2023-10-29
Attending: STUDENT IN AN ORGANIZED HEALTH CARE EDUCATION/TRAINING PROGRAM | Admitting: PEDIATRICS
Payer: COMMERCIAL

## 2023-10-26 DIAGNOSIS — K62.5 RECTAL BLEEDING: Primary | ICD-10-CM

## 2023-10-26 PROCEDURE — 99285 EMERGENCY DEPT VISIT HI MDM: CPT

## 2023-10-26 ASSESSMENT — PAIN - FUNCTIONAL ASSESSMENT: PAIN_FUNCTIONAL_ASSESSMENT: 0-10

## 2023-10-26 ASSESSMENT — PAIN DESCRIPTION - DESCRIPTORS: DESCRIPTORS: ACHING

## 2023-10-26 ASSESSMENT — PAIN DESCRIPTION - LOCATION: LOCATION: ABDOMEN

## 2023-10-26 ASSESSMENT — PAIN SCALES - GENERAL: PAINLEVEL_OUTOF10: 3

## 2023-10-26 ASSESSMENT — PAIN DESCRIPTION - ORIENTATION: ORIENTATION: LOWER

## 2023-10-27 ENCOUNTER — PATIENT MESSAGE (OUTPATIENT)
Dept: INTERNAL MEDICINE CLINIC | Age: 43
End: 2023-10-27

## 2023-10-27 ENCOUNTER — APPOINTMENT (OUTPATIENT)
Dept: CT IMAGING | Age: 43
DRG: 920 | End: 2023-10-27
Payer: COMMERCIAL

## 2023-10-27 PROBLEM — K92.2 GI BLEED: Status: ACTIVE | Noted: 2023-10-27

## 2023-10-27 LAB
ABO + RH BLD: NORMAL
ALBUMIN SERPL-MCNC: 4.4 G/DL (ref 3.4–5)
ALBUMIN/GLOB SERPL: 1.4 {RATIO} (ref 1.1–2.2)
ALP SERPL-CCNC: 105 U/L (ref 40–129)
ALT SERPL-CCNC: 64 U/L (ref 10–40)
ANION GAP SERPL CALCULATED.3IONS-SCNC: 12 MMOL/L (ref 3–16)
ANION GAP SERPL CALCULATED.3IONS-SCNC: 9 MMOL/L (ref 3–16)
APTT BLD: 29.7 SEC (ref 22.7–35.9)
AST SERPL-CCNC: 27 U/L (ref 15–37)
BASOPHILS # BLD: 0.1 K/UL (ref 0–0.2)
BASOPHILS NFR BLD: 1 %
BILIRUB SERPL-MCNC: 0.3 MG/DL (ref 0–1)
BLD GP AB SCN SERPL QL: NORMAL
BUN SERPL-MCNC: 14 MG/DL (ref 7–20)
BUN SERPL-MCNC: 15 MG/DL (ref 7–20)
CALCIUM SERPL-MCNC: 8.4 MG/DL (ref 8.3–10.6)
CALCIUM SERPL-MCNC: 9.1 MG/DL (ref 8.3–10.6)
CHLORIDE SERPL-SCNC: 105 MMOL/L (ref 99–110)
CHLORIDE SERPL-SCNC: 108 MMOL/L (ref 99–110)
CO2 SERPL-SCNC: 24 MMOL/L (ref 21–32)
CO2 SERPL-SCNC: 25 MMOL/L (ref 21–32)
CREAT SERPL-MCNC: 1.1 MG/DL (ref 0.9–1.3)
CREAT SERPL-MCNC: 1.3 MG/DL (ref 0.9–1.3)
DEPRECATED RDW RBC AUTO: 14.9 % (ref 12.4–15.4)
EOSINOPHIL # BLD: 0.2 K/UL (ref 0–0.6)
EOSINOPHIL NFR BLD: 2.5 %
FIBRINOGEN PPP-MCNC: 282 MG/DL (ref 243–550)
GFR SERPLBLD CREATININE-BSD FMLA CKD-EPI: >60 ML/MIN/{1.73_M2}
GFR SERPLBLD CREATININE-BSD FMLA CKD-EPI: >60 ML/MIN/{1.73_M2}
GLUCOSE SERPL-MCNC: 118 MG/DL (ref 70–99)
GLUCOSE SERPL-MCNC: 139 MG/DL (ref 70–99)
HCT VFR BLD AUTO: 34.9 % (ref 40.5–52.5)
HCT VFR BLD AUTO: 36.3 % (ref 40.5–52.5)
HCT VFR BLD AUTO: 37.5 % (ref 40.5–52.5)
HCT VFR BLD AUTO: 39.9 % (ref 40.5–52.5)
HGB BLD-MCNC: 11.6 G/DL (ref 13.5–17.5)
HGB BLD-MCNC: 12.3 G/DL (ref 13.5–17.5)
HGB BLD-MCNC: 12.5 G/DL (ref 13.5–17.5)
HGB BLD-MCNC: 13.6 G/DL (ref 13.5–17.5)
INR PPP: 1.04 (ref 0.84–1.16)
LYMPHOCYTES # BLD: 2.3 K/UL (ref 1–5.1)
LYMPHOCYTES NFR BLD: 32.4 %
MCH RBC QN AUTO: 27.2 PG (ref 26–34)
MCHC RBC AUTO-ENTMCNC: 34 G/DL (ref 31–36)
MCV RBC AUTO: 79.8 FL (ref 80–100)
MONOCYTES # BLD: 0.6 K/UL (ref 0–1.3)
MONOCYTES NFR BLD: 8.1 %
NEUTROPHILS # BLD: 3.9 K/UL (ref 1.7–7.7)
NEUTROPHILS NFR BLD: 56 %
PLATELET # BLD AUTO: 191 K/UL (ref 135–450)
PMV BLD AUTO: 8.8 FL (ref 5–10.5)
POTASSIUM SERPL-SCNC: 3.8 MMOL/L (ref 3.5–5.1)
POTASSIUM SERPL-SCNC: 3.9 MMOL/L (ref 3.5–5.1)
PROT SERPL-MCNC: 7.5 G/DL (ref 6.4–8.2)
PROTHROMBIN TIME: 13.6 SEC (ref 11.5–14.8)
RBC # BLD AUTO: 5 M/UL (ref 4.2–5.9)
SODIUM SERPL-SCNC: 141 MMOL/L (ref 136–145)
SODIUM SERPL-SCNC: 142 MMOL/L (ref 136–145)
WBC # BLD AUTO: 7 K/UL (ref 4–11)

## 2023-10-27 PROCEDURE — 7100000011 HC PHASE II RECOVERY - ADDTL 15 MIN: Performed by: INTERNAL MEDICINE

## 2023-10-27 PROCEDURE — 36415 COLL VENOUS BLD VENIPUNCTURE: CPT

## 2023-10-27 PROCEDURE — 85610 PROTHROMBIN TIME: CPT

## 2023-10-27 PROCEDURE — 0W3P8ZZ CONTROL BLEEDING IN GASTROINTESTINAL TRACT, VIA NATURAL OR ARTIFICIAL OPENING ENDOSCOPIC: ICD-10-PCS | Performed by: INTERNAL MEDICINE

## 2023-10-27 PROCEDURE — 85014 HEMATOCRIT: CPT

## 2023-10-27 PROCEDURE — 86901 BLOOD TYPING SEROLOGIC RH(D): CPT

## 2023-10-27 PROCEDURE — 2580000003 HC RX 258: Performed by: PEDIATRICS

## 2023-10-27 PROCEDURE — 85018 HEMOGLOBIN: CPT

## 2023-10-27 PROCEDURE — 86850 RBC ANTIBODY SCREEN: CPT

## 2023-10-27 PROCEDURE — 3609008600 HC SIGMOIDOSCOPY CONTROL HEMORRHAGE: Performed by: INTERNAL MEDICINE

## 2023-10-27 PROCEDURE — 80053 COMPREHEN METABOLIC PANEL: CPT

## 2023-10-27 PROCEDURE — 6360000002 HC RX W HCPCS: Performed by: PEDIATRICS

## 2023-10-27 PROCEDURE — 6370000000 HC RX 637 (ALT 250 FOR IP): Performed by: PEDIATRICS

## 2023-10-27 PROCEDURE — 6360000002 HC RX W HCPCS: Performed by: INTERNAL MEDICINE

## 2023-10-27 PROCEDURE — 85025 COMPLETE CBC W/AUTO DIFF WBC: CPT

## 2023-10-27 PROCEDURE — 85730 THROMBOPLASTIN TIME PARTIAL: CPT

## 2023-10-27 PROCEDURE — 85384 FIBRINOGEN ACTIVITY: CPT

## 2023-10-27 PROCEDURE — C1889 IMPLANT/INSERT DEVICE, NOC: HCPCS | Performed by: INTERNAL MEDICINE

## 2023-10-27 PROCEDURE — 86900 BLOOD TYPING SEROLOGIC ABO: CPT

## 2023-10-27 PROCEDURE — 6360000004 HC RX CONTRAST MEDICATION: Performed by: NURSE PRACTITIONER

## 2023-10-27 PROCEDURE — 7100000010 HC PHASE II RECOVERY - FIRST 15 MIN: Performed by: INTERNAL MEDICINE

## 2023-10-27 PROCEDURE — 2709999900 HC NON-CHARGEABLE SUPPLY: Performed by: INTERNAL MEDICINE

## 2023-10-27 PROCEDURE — 6370000000 HC RX 637 (ALT 250 FOR IP): Performed by: HOSPITALIST

## 2023-10-27 PROCEDURE — 74174 CTA ABD&PLVS W/CONTRAST: CPT

## 2023-10-27 PROCEDURE — 1200000000 HC SEMI PRIVATE

## 2023-10-27 DEVICE — WORKING LENGTH 235CM, WORKING CHANNEL 2.8MM
Type: IMPLANTABLE DEVICE | Site: RECTUM | Status: FUNCTIONAL
Brand: RESOLUTION 360 CLIP

## 2023-10-27 DEVICE — CLIP
Type: IMPLANTABLE DEVICE | Site: RECTUM | Status: FUNCTIONAL
Brand: RESOLUTION 360™ ULTRA CLIP

## 2023-10-27 RX ORDER — SPIRONOLACTONE 25 MG/1
25 TABLET ORAL DAILY
COMMUNITY

## 2023-10-27 RX ORDER — SODIUM CHLORIDE 0.9 % (FLUSH) 0.9 %
5-40 SYRINGE (ML) INJECTION EVERY 12 HOURS SCHEDULED
Status: DISCONTINUED | OUTPATIENT
Start: 2023-10-27 | End: 2023-10-29 | Stop reason: HOSPADM

## 2023-10-27 RX ORDER — PIMECROLIMUS 10 MG/G
CREAM TOPICAL 2 TIMES DAILY
Status: DISCONTINUED | OUTPATIENT
Start: 2023-10-27 | End: 2023-10-29 | Stop reason: HOSPADM

## 2023-10-27 RX ORDER — TRAMADOL HYDROCHLORIDE 50 MG/1
50 TABLET ORAL EVERY 4 HOURS PRN
Status: DISCONTINUED | OUTPATIENT
Start: 2023-10-27 | End: 2023-10-29 | Stop reason: HOSPADM

## 2023-10-27 RX ORDER — LIDOCAINE 4 G/G
1 PATCH TOPICAL DAILY
Status: DISCONTINUED | OUTPATIENT
Start: 2023-10-27 | End: 2023-10-29 | Stop reason: HOSPADM

## 2023-10-27 RX ORDER — ATORVASTATIN CALCIUM 40 MG/1
40 TABLET, FILM COATED ORAL DAILY
Status: DISCONTINUED | OUTPATIENT
Start: 2023-10-27 | End: 2023-10-27 | Stop reason: SDUPTHER

## 2023-10-27 RX ORDER — SODIUM CHLORIDE 0.9 % (FLUSH) 0.9 %
5-40 SYRINGE (ML) INJECTION PRN
Status: DISCONTINUED | OUTPATIENT
Start: 2023-10-27 | End: 2023-10-29 | Stop reason: HOSPADM

## 2023-10-27 RX ORDER — DOCUSATE SODIUM 100 MG/1
100 CAPSULE, LIQUID FILLED ORAL DAILY
Status: DISCONTINUED | OUTPATIENT
Start: 2023-10-27 | End: 2023-10-29 | Stop reason: HOSPADM

## 2023-10-27 RX ORDER — ATOMOXETINE 40 MG/1
40 CAPSULE ORAL DAILY
Status: DISCONTINUED | OUTPATIENT
Start: 2023-10-27 | End: 2023-10-29 | Stop reason: HOSPADM

## 2023-10-27 RX ORDER — ACETAMINOPHEN 650 MG/1
650 SUPPOSITORY RECTAL EVERY 6 HOURS PRN
Status: DISCONTINUED | OUTPATIENT
Start: 2023-10-27 | End: 2023-10-29 | Stop reason: HOSPADM

## 2023-10-27 RX ORDER — METHOCARBAMOL 750 MG/1
750 TABLET, FILM COATED ORAL 4 TIMES DAILY
Status: DISCONTINUED | OUTPATIENT
Start: 2023-10-27 | End: 2023-10-29 | Stop reason: HOSPADM

## 2023-10-27 RX ORDER — POLYETHYLENE GLYCOL 3350 17 G/17G
17 POWDER, FOR SOLUTION ORAL DAILY PRN
Status: DISCONTINUED | OUTPATIENT
Start: 2023-10-27 | End: 2023-10-29 | Stop reason: HOSPADM

## 2023-10-27 RX ORDER — SODIUM CHLORIDE 9 MG/ML
INJECTION, SOLUTION INTRAVENOUS PRN
Status: DISCONTINUED | OUTPATIENT
Start: 2023-10-27 | End: 2023-10-29 | Stop reason: HOSPADM

## 2023-10-27 RX ORDER — ONDANSETRON 2 MG/ML
4 INJECTION INTRAMUSCULAR; INTRAVENOUS EVERY 6 HOURS PRN
Status: DISCONTINUED | OUTPATIENT
Start: 2023-10-27 | End: 2023-10-29 | Stop reason: HOSPADM

## 2023-10-27 RX ORDER — ONDANSETRON 4 MG/1
4 TABLET, ORALLY DISINTEGRATING ORAL EVERY 8 HOURS PRN
Status: DISCONTINUED | OUTPATIENT
Start: 2023-10-27 | End: 2023-10-29 | Stop reason: HOSPADM

## 2023-10-27 RX ORDER — ATORVASTATIN CALCIUM 40 MG/1
40 TABLET, FILM COATED ORAL DAILY
Status: DISCONTINUED | OUTPATIENT
Start: 2023-10-27 | End: 2023-10-29 | Stop reason: HOSPADM

## 2023-10-27 RX ORDER — MIDAZOLAM HYDROCHLORIDE 5 MG/ML
INJECTION INTRAMUSCULAR; INTRAVENOUS PRN
Status: DISCONTINUED | OUTPATIENT
Start: 2023-10-27 | End: 2023-10-27 | Stop reason: ALTCHOICE

## 2023-10-27 RX ORDER — ACETAMINOPHEN 325 MG/1
650 TABLET ORAL EVERY 6 HOURS PRN
Status: DISCONTINUED | OUTPATIENT
Start: 2023-10-27 | End: 2023-10-29 | Stop reason: HOSPADM

## 2023-10-27 RX ORDER — CETIRIZINE HYDROCHLORIDE 10 MG/1
10 TABLET ORAL DAILY
Status: CANCELLED | OUTPATIENT
Start: 2023-10-27

## 2023-10-27 RX ADMIN — METHOCARBAMOL TABLETS 750 MG: 750 TABLET, COATED ORAL at 17:44

## 2023-10-27 RX ADMIN — DOCUSATE SODIUM 100 MG: 100 CAPSULE, LIQUID FILLED ORAL at 10:55

## 2023-10-27 RX ADMIN — IOPAMIDOL 75 ML: 755 INJECTION, SOLUTION INTRAVENOUS at 00:58

## 2023-10-27 RX ADMIN — SODIUM CHLORIDE, PRESERVATIVE FREE 20 ML: 5 INJECTION INTRAVENOUS at 23:00

## 2023-10-27 RX ADMIN — ATORVASTATIN CALCIUM 40 MG: 40 TABLET, FILM COATED ORAL at 13:33

## 2023-10-27 RX ADMIN — METHOCARBAMOL TABLETS 750 MG: 750 TABLET, COATED ORAL at 10:54

## 2023-10-27 RX ADMIN — METHOCARBAMOL TABLETS 750 MG: 750 TABLET, COATED ORAL at 13:32

## 2023-10-27 RX ADMIN — SODIUM CHLORIDE, PRESERVATIVE FREE 10 ML: 5 INJECTION INTRAVENOUS at 10:55

## 2023-10-27 RX ADMIN — TRAMADOL HYDROCHLORIDE 50 MG: 50 TABLET ORAL at 22:58

## 2023-10-27 RX ADMIN — METHOCARBAMOL TABLETS 750 MG: 750 TABLET, COATED ORAL at 22:58

## 2023-10-27 RX ADMIN — ONDANSETRON 4 MG: 2 INJECTION INTRAMUSCULAR; INTRAVENOUS at 13:39

## 2023-10-27 ASSESSMENT — PAIN SCALES - GENERAL
PAINLEVEL_OUTOF10: 0
PAINLEVEL_OUTOF10: 3
PAINLEVEL_OUTOF10: 0
PAINLEVEL_OUTOF10: 0
PAINLEVEL_OUTOF10: 4
PAINLEVEL_OUTOF10: 0
PAINLEVEL_OUTOF10: 3
PAINLEVEL_OUTOF10: 0
PAINLEVEL_OUTOF10: 3
PAINLEVEL_OUTOF10: 0
PAINLEVEL_OUTOF10: 0
PAINLEVEL_OUTOF10: 3

## 2023-10-27 ASSESSMENT — PAIN DESCRIPTION - DESCRIPTORS
DESCRIPTORS: ACHING

## 2023-10-27 ASSESSMENT — PAIN DESCRIPTION - ORIENTATION: ORIENTATION: LOWER

## 2023-10-27 ASSESSMENT — PAIN DESCRIPTION - LOCATION
LOCATION: BACK
LOCATION: LEG
LOCATION: BACK

## 2023-10-27 ASSESSMENT — PAIN DESCRIPTION - ONSET: ONSET: ON-GOING

## 2023-10-27 ASSESSMENT — PAIN - FUNCTIONAL ASSESSMENT
PAIN_FUNCTIONAL_ASSESSMENT: PREVENTS OR INTERFERES SOME ACTIVE ACTIVITIES AND ADLS
PAIN_FUNCTIONAL_ASSESSMENT: ACTIVITIES ARE NOT PREVENTED
PAIN_FUNCTIONAL_ASSESSMENT: PREVENTS OR INTERFERES SOME ACTIVE ACTIVITIES AND ADLS

## 2023-10-27 ASSESSMENT — PAIN DESCRIPTION - PAIN TYPE
TYPE: CHRONIC PAIN
TYPE: CHRONIC PAIN
TYPE: DEEP SOMATIC PAIN

## 2023-10-27 ASSESSMENT — PAIN DESCRIPTION - FREQUENCY: FREQUENCY: INTERMITTENT

## 2023-10-27 NOTE — CARE COORDINATION
[facility] [address] [facility]  [address] [facility]  Phone: [phone] Fax: [phone]    [place] - [address] [place] [phone] Mason Vera [phone]  [address] [facility] [address]  Phone: [phone] Fax: [phone]    [facility] [place], [address] [facility] [address] [address]  Phone: [phone] Fax: [phone]      Notes:    Factors facilitating achievement of predicted outcomes: Family support, Motivated, Cooperative, Pleasant, and Sense of humor    Barriers to discharge: Medical complications    Additional Case Management Notes: Patient from home with family. Patient reports Rocco Holiday, works full time. Patient has INS, has transport, denies concerns.      The Plan for Transition of Care is related to the following treatment goals of Rectal bleeding [K62.5]  GI bleed [K92.2]    The Patient and/or Patient Representative Agree with the Discharge Plan?  yes    Chris Muñoz, [address] [facility]  Case Management Department  Ph: [phone] Fax: [phone]

## 2023-10-27 NOTE — CONSULTS
12 - PS to Dr Herb Apple at general surgery per consult  Re: Pebbles Apple called back to speak with NP Jerman Calabrese

## 2023-10-27 NOTE — PLAN OF CARE
Problem: Pain  Goal: Verbalizes/displays adequate comfort level or baseline comfort level  Outcome: Progressing  Flowsheets (Taken 10/27/2023 1246)  Verbalizes/displays adequate comfort level or baseline comfort level:   Encourage patient to monitor pain and request assistance   Assess pain using appropriate pain scale   Administer analgesics based on type and severity of pain and evaluate response   Implement non-pharmacological measures as appropriate and evaluate response

## 2023-10-27 NOTE — OP NOTE
Flex Sig Note    Patient:   Stevie Crook III    YOB: 1980    Facility:   [facility] [Inpatient]  Referring/PCP: MARGY Whitehead CNP  Procedure:   Flex Sig --diagnostic  Date:     10/27/2023  Endoscopist:  Duane Florence, MD, MD    Preoperative Diagnosis:  hematochezia/post-polypectomy bleed s/p NSAID's. Postoperative Diagnosis: Bleeding proximal rectal area, endoclipped w 2 ultra and 3 regular Endoclips    Anesthesia: Versed 4 mg IV. Estimated blood loss: < 5 ml    Complications:  None    Description of Procedure:  Informed consent was obtained from the patient after explanation of the procedure including indications, description of the procedure,  benefits and possible risks and complications of the procedure, and alternatives. Questions were answered. The patient's history was reviewed and a directed physical examination was performed prior to the procedure. Patient was monitored throughout the procedure with pulse oximetry and periodic assessment of vital signs. Patient was sedated as noted above. The Nursing staff and I performed a time out. With the patient initially in the left lateral decubitus position, a digital rectal examination was performed and revealed negative without mass, lesions or tenderness. The Olympus video colonoscope was placed in the patient's rectum and advanced without difficulty  to the rectum. Photographs were taken. The prep was good. Examination of the mucosa was performed during both introduction and withdrawal of the colonoscope. Retroflexed view of the rectum was performed. Findings:     Bleeding proximal rectal area, endoclipped w 2 ultra and 3 regular Endoclips     Recommendations:   Follow clinical symptoms and laboratory studies for evidence of rebleeding  Only liquid diet today    Duane Florence, MD       (O) [phone]        Duane Florence, MD, MD

## 2023-10-27 NOTE — DISCHARGE INSTRUCTIONS
ENDOSCOPIC CLIP INSTRUCTIONS      You have had an Endoscopy today and had a metal clip placed in your gastrointestinal tract. If you have to have a MRI please give the laminated card to the MRI technician prior to your procedure. The card has reccommended settings for MRI after an Endoclip placement. Thank you.

## 2023-10-27 NOTE — CONSULTS
Consult Placed   Consult sent through perfect serve  Who: Ciarra Tong  Date: 10/27/2023   Time: 9:11     Electronically signed by Dyllan Ortega on 10/27/2023 at 9:10 AM

## 2023-10-27 NOTE — CONSULTS
36 - PS to Dr Kvng Orta at [address] [facility] per consult  Re: rectal bleeding after colonoscopy  0149 - Dr Kvng Orta called back to speak with PAUL Jeff

## 2023-10-27 NOTE — CONSULTS
Consult noted. With sigmoidoscopy findings and results no intervention surgical required at this time. Please reconsult if needed.     Juan C Payne MD

## 2023-10-27 NOTE — ED NOTES
Followed up his blood type and screen machine under maintenance according to Esequiel Johnson it will take 20-30mins  more.      Cam Bond RN  10/27/23 2567

## 2023-10-28 LAB
ANION GAP SERPL CALCULATED.3IONS-SCNC: 9 MMOL/L (ref 3–16)
BASOPHILS # BLD: 0.1 K/UL (ref 0–0.2)
BASOPHILS NFR BLD: 1 %
BUN SERPL-MCNC: 11 MG/DL (ref 7–20)
CALCIUM SERPL-MCNC: 8.2 MG/DL (ref 8.3–10.6)
CHLORIDE SERPL-SCNC: 102 MMOL/L (ref 99–110)
CO2 SERPL-SCNC: 26 MMOL/L (ref 21–32)
CREAT SERPL-MCNC: 1.1 MG/DL (ref 0.9–1.3)
DEPRECATED RDW RBC AUTO: 14.7 % (ref 12.4–15.4)
EOSINOPHIL # BLD: 0.2 K/UL (ref 0–0.6)
EOSINOPHIL NFR BLD: 3.4 %
GFR SERPLBLD CREATININE-BSD FMLA CKD-EPI: >60 ML/MIN/{1.73_M2}
GLUCOSE SERPL-MCNC: 112 MG/DL (ref 70–99)
HCT VFR BLD AUTO: 31.4 % (ref 40.5–52.5)
HCT VFR BLD AUTO: 31.9 % (ref 40.5–52.5)
HCT VFR BLD AUTO: 32.2 % (ref 40.5–52.5)
HGB BLD-MCNC: 10.6 G/DL (ref 13.5–17.5)
HGB BLD-MCNC: 10.9 G/DL (ref 13.5–17.5)
HGB BLD-MCNC: 10.9 G/DL (ref 13.5–17.5)
LYMPHOCYTES # BLD: 1.8 K/UL (ref 1–5.1)
LYMPHOCYTES NFR BLD: 32.6 %
MCH RBC QN AUTO: 27.2 PG (ref 26–34)
MCHC RBC AUTO-ENTMCNC: 34.3 G/DL (ref 31–36)
MCV RBC AUTO: 79.5 FL (ref 80–100)
MONOCYTES # BLD: 0.4 K/UL (ref 0–1.3)
MONOCYTES NFR BLD: 7.2 %
NEUTROPHILS # BLD: 3.1 K/UL (ref 1.7–7.7)
NEUTROPHILS NFR BLD: 55.8 %
PLATELET # BLD AUTO: 177 K/UL (ref 135–450)
PMV BLD AUTO: 8.4 FL (ref 5–10.5)
POTASSIUM SERPL-SCNC: 3.8 MMOL/L (ref 3.5–5.1)
RBC # BLD AUTO: 4.01 M/UL (ref 4.2–5.9)
SODIUM SERPL-SCNC: 137 MMOL/L (ref 136–145)
WBC # BLD AUTO: 5.5 K/UL (ref 4–11)

## 2023-10-28 PROCEDURE — 6370000000 HC RX 637 (ALT 250 FOR IP): Performed by: HOSPITALIST

## 2023-10-28 PROCEDURE — 6370000000 HC RX 637 (ALT 250 FOR IP): Performed by: PEDIATRICS

## 2023-10-28 PROCEDURE — 85018 HEMOGLOBIN: CPT

## 2023-10-28 PROCEDURE — 7100000010 HC PHASE II RECOVERY - FIRST 15 MIN: Performed by: INTERNAL MEDICINE

## 2023-10-28 PROCEDURE — 2709999900 HC NON-CHARGEABLE SUPPLY: Performed by: INTERNAL MEDICINE

## 2023-10-28 PROCEDURE — 80048 BASIC METABOLIC PNL TOTAL CA: CPT

## 2023-10-28 PROCEDURE — 2580000003 HC RX 258: Performed by: PEDIATRICS

## 2023-10-28 PROCEDURE — 7100000011 HC PHASE II RECOVERY - ADDTL 15 MIN: Performed by: INTERNAL MEDICINE

## 2023-10-28 PROCEDURE — 85025 COMPLETE CBC W/AUTO DIFF WBC: CPT

## 2023-10-28 PROCEDURE — 3609008400 HC SIGMOIDOSCOPY DIAGNOSTIC: Performed by: INTERNAL MEDICINE

## 2023-10-28 PROCEDURE — 36415 COLL VENOUS BLD VENIPUNCTURE: CPT

## 2023-10-28 PROCEDURE — 6360000002 HC RX W HCPCS: Performed by: INTERNAL MEDICINE

## 2023-10-28 PROCEDURE — 94761 N-INVAS EAR/PLS OXIMETRY MLT: CPT

## 2023-10-28 PROCEDURE — 99152 MOD SED SAME PHYS/QHP 5/>YRS: CPT | Performed by: INTERNAL MEDICINE

## 2023-10-28 PROCEDURE — 0DJD8ZZ INSPECTION OF LOWER INTESTINAL TRACT, VIA NATURAL OR ARTIFICIAL OPENING ENDOSCOPIC: ICD-10-PCS | Performed by: INTERNAL MEDICINE

## 2023-10-28 PROCEDURE — 2700000000 HC OXYGEN THERAPY PER DAY

## 2023-10-28 PROCEDURE — 85014 HEMATOCRIT: CPT

## 2023-10-28 PROCEDURE — 1200000000 HC SEMI PRIVATE

## 2023-10-28 RX ORDER — MIDAZOLAM HYDROCHLORIDE 5 MG/ML
INJECTION INTRAMUSCULAR; INTRAVENOUS PRN
Status: DISCONTINUED | OUTPATIENT
Start: 2023-10-28 | End: 2023-10-28 | Stop reason: ALTCHOICE

## 2023-10-28 RX ADMIN — METHOCARBAMOL TABLETS 750 MG: 750 TABLET, COATED ORAL at 23:10

## 2023-10-28 RX ADMIN — ACETAMINOPHEN 650 MG: 325 TABLET ORAL at 05:23

## 2023-10-28 RX ADMIN — DOCUSATE SODIUM 100 MG: 100 CAPSULE, LIQUID FILLED ORAL at 11:23

## 2023-10-28 RX ADMIN — SODIUM CHLORIDE, PRESERVATIVE FREE 10 ML: 5 INJECTION INTRAVENOUS at 23:14

## 2023-10-28 RX ADMIN — METHOCARBAMOL TABLETS 750 MG: 750 TABLET, COATED ORAL at 11:22

## 2023-10-28 RX ADMIN — TRAMADOL HYDROCHLORIDE 50 MG: 50 TABLET ORAL at 23:10

## 2023-10-28 RX ADMIN — SODIUM CHLORIDE, PRESERVATIVE FREE 10 ML: 5 INJECTION INTRAVENOUS at 11:28

## 2023-10-28 RX ADMIN — ATORVASTATIN CALCIUM 40 MG: 40 TABLET, FILM COATED ORAL at 11:23

## 2023-10-28 ASSESSMENT — PAIN DESCRIPTION - LOCATION: LOCATION: ABDOMEN

## 2023-10-28 ASSESSMENT — PAIN DESCRIPTION - ORIENTATION: ORIENTATION: MID

## 2023-10-28 ASSESSMENT — PAIN SCALES - GENERAL
PAINLEVEL_OUTOF10: 2
PAINLEVEL_OUTOF10: 3

## 2023-10-28 ASSESSMENT — PAIN DESCRIPTION - DESCRIPTORS: DESCRIPTORS: ACHING

## 2023-10-28 NOTE — PLAN OF CARE
Problem: Pain  Goal: Verbalizes/displays adequate comfort level or baseline comfort level  10/27/2023 2051 by Mitch Navarro RN  Outcome: Progressing  10/27/2023 1246 by Luis Dennison RN  Outcome: Progressing  Flowsheets (Taken 10/27/2023 1246)  Verbalizes/displays adequate comfort level or baseline comfort level:   Encourage patient to monitor pain and request assistance   Assess pain using appropriate pain scale   Administer analgesics based on type and severity of pain and evaluate response   Implement non-pharmacological measures as appropriate and evaluate response

## 2023-10-28 NOTE — OP NOTE
Flex Sig Note    Patient:   Jodi Riojas III    YOB: 1980    Facility:   [facility] [Inpatient]  Referring/PCP: MARGY Singh CNP  Procedure:   Flex Sig --diagnostic  Date:     10/28/2023  Endoscopist:  Murray Hancock MD, MD    Preoperative Diagnosis: hematochezia/post-polypectomy bleed s/p NSAID's. Flex sig 10/27 revealed a bleeding site in proximal rectum necessitating placement of 5 Endoclips, but patient kept having some rectal bleeding since. Postoperative Diagnosis: Large amount of old blood mixed w stool, but no active bleeding appreciated and I could not see the previous bleeding site due to the prep. Anesthesia: Versed 4 mg IV. Estimated blood loss: < 5 ml    Complications:  None    Description of Procedure:  Informed consent was obtained from the patient after explanation of the procedure including indications, description of the procedure,  benefits and possible risks and complications of the procedure, and alternatives. Questions were answered. The patient's history was reviewed and a directed physical examination was performed prior to the procedure. Patient was monitored throughout the procedure with pulse oximetry and periodic assessment of vital signs. Patient was sedated as noted above. The Nursing staff and I performed a time out. With the patient initially in the left lateral decubitus position, a digital rectal examination was performed and revealed negative without mass, lesions or tenderness. The Olympus video colonoscope was placed in the patient's rectum and advanced without difficulty  to the sigmoid colon. Photographs were taken. The prep was poor. Examination of the mucosa was performed during both introduction and withdrawal of the colonoscope. Retroflexed view of the rectum was performed.        Findings:     Large amount of old blood mixed w stool, but no active bleeding appreciated and I could not see the previous bleeding

## 2023-10-29 VITALS
WEIGHT: 300 LBS | DIASTOLIC BLOOD PRESSURE: 91 MMHG | SYSTOLIC BLOOD PRESSURE: 155 MMHG | HEIGHT: 70 IN | HEART RATE: 97 BPM | OXYGEN SATURATION: 98 % | TEMPERATURE: 98 F | RESPIRATION RATE: 18 BRPM | BODY MASS INDEX: 42.95 KG/M2

## 2023-10-29 LAB
ANION GAP SERPL CALCULATED.3IONS-SCNC: 7 MMOL/L (ref 3–16)
BASOPHILS # BLD: 0.1 K/UL (ref 0–0.2)
BASOPHILS NFR BLD: 1 %
BUN SERPL-MCNC: 9 MG/DL (ref 7–20)
CALCIUM SERPL-MCNC: 8.7 MG/DL (ref 8.3–10.6)
CHLORIDE SERPL-SCNC: 101 MMOL/L (ref 99–110)
CO2 SERPL-SCNC: 27 MMOL/L (ref 21–32)
CREAT SERPL-MCNC: 1.1 MG/DL (ref 0.9–1.3)
DEPRECATED RDW RBC AUTO: 14.8 % (ref 12.4–15.4)
EOSINOPHIL # BLD: 0.2 K/UL (ref 0–0.6)
EOSINOPHIL NFR BLD: 3.1 %
GFR SERPLBLD CREATININE-BSD FMLA CKD-EPI: >60 ML/MIN/{1.73_M2}
GLUCOSE SERPL-MCNC: 124 MG/DL (ref 70–99)
HCT VFR BLD AUTO: 29.9 % (ref 40.5–52.5)
HCT VFR BLD AUTO: 30.8 % (ref 40.5–52.5)
HGB BLD-MCNC: 10.3 G/DL (ref 13.5–17.5)
HGB BLD-MCNC: 10.6 G/DL (ref 13.5–17.5)
LYMPHOCYTES # BLD: 2 K/UL (ref 1–5.1)
LYMPHOCYTES NFR BLD: 36 %
MCH RBC QN AUTO: 27.4 PG (ref 26–34)
MCHC RBC AUTO-ENTMCNC: 34.5 G/DL (ref 31–36)
MCV RBC AUTO: 79.5 FL (ref 80–100)
MONOCYTES # BLD: 0.6 K/UL (ref 0–1.3)
MONOCYTES NFR BLD: 10 %
NEUTROPHILS # BLD: 2.8 K/UL (ref 1.7–7.7)
NEUTROPHILS NFR BLD: 49.9 %
PLATELET # BLD AUTO: 168 K/UL (ref 135–450)
PMV BLD AUTO: 8.5 FL (ref 5–10.5)
POTASSIUM SERPL-SCNC: 3.6 MMOL/L (ref 3.5–5.1)
RBC # BLD AUTO: 3.75 M/UL (ref 4.2–5.9)
SODIUM SERPL-SCNC: 135 MMOL/L (ref 136–145)
WBC # BLD AUTO: 5.5 K/UL (ref 4–11)

## 2023-10-29 PROCEDURE — 85025 COMPLETE CBC W/AUTO DIFF WBC: CPT

## 2023-10-29 PROCEDURE — 80048 BASIC METABOLIC PNL TOTAL CA: CPT

## 2023-10-29 PROCEDURE — 85018 HEMOGLOBIN: CPT

## 2023-10-29 PROCEDURE — 2580000003 HC RX 258: Performed by: PEDIATRICS

## 2023-10-29 PROCEDURE — 6370000000 HC RX 637 (ALT 250 FOR IP): Performed by: HOSPITALIST

## 2023-10-29 PROCEDURE — 36415 COLL VENOUS BLD VENIPUNCTURE: CPT

## 2023-10-29 PROCEDURE — 85014 HEMATOCRIT: CPT

## 2023-10-29 PROCEDURE — 6370000000 HC RX 637 (ALT 250 FOR IP): Performed by: PEDIATRICS

## 2023-10-29 RX ORDER — FERROUS SULFATE 325(65) MG
325 TABLET ORAL 2 TIMES DAILY
Qty: 180 TABLET | Refills: 1 | Status: SHIPPED | OUTPATIENT
Start: 2023-10-29

## 2023-10-29 RX ORDER — LIDOCAINE 4 G/G
1 PATCH TOPICAL DAILY
Qty: 10 PATCH | Refills: 0 | Status: SHIPPED | OUTPATIENT
Start: 2023-10-30

## 2023-10-29 RX ORDER — POLYETHYLENE GLYCOL 3350 17 G/17G
17 POWDER, FOR SOLUTION ORAL DAILY
Status: DISCONTINUED | OUTPATIENT
Start: 2023-10-29 | End: 2023-10-29 | Stop reason: HOSPADM

## 2023-10-29 RX ADMIN — ACETAMINOPHEN 650 MG: 325 TABLET ORAL at 08:56

## 2023-10-29 RX ADMIN — SODIUM CHLORIDE, PRESERVATIVE FREE 10 ML: 5 INJECTION INTRAVENOUS at 09:15

## 2023-10-29 RX ADMIN — METHOCARBAMOL TABLETS 750 MG: 750 TABLET, COATED ORAL at 08:55

## 2023-10-29 RX ADMIN — DOCUSATE SODIUM 100 MG: 100 CAPSULE, LIQUID FILLED ORAL at 08:55

## 2023-10-29 RX ADMIN — ATORVASTATIN CALCIUM 40 MG: 40 TABLET, FILM COATED ORAL at 08:57

## 2023-10-29 ASSESSMENT — PAIN - FUNCTIONAL ASSESSMENT: PAIN_FUNCTIONAL_ASSESSMENT: ACTIVITIES ARE NOT PREVENTED

## 2023-10-29 ASSESSMENT — PAIN DESCRIPTION - DESCRIPTORS: DESCRIPTORS: ACHING

## 2023-10-29 ASSESSMENT — PAIN DESCRIPTION - LOCATION: LOCATION: HEAD

## 2023-10-29 ASSESSMENT — PAIN SCALES - GENERAL: PAINLEVEL_OUTOF10: 3

## 2023-10-29 NOTE — PROGRESS NOTES
4 Eyes Skin Assessment     The patient is being assess for  Admission    I agree that 2 RN's have performed a thorough Head to Toe Skin Assessment on the patient. ALL assessment sites listed below have been assessed. Areas assessed by both nurses: FT  [x]   Head, Face, and Ears   [x]   Shoulders, Back, and Chest  [x]   Arms, Elbows, and Hands   [x]   Coccyx, Sacrum, and IschIum  [x]   Legs, Feet, and Heels        Does the Patient have Skin Breakdown?   No         Sean Prevention initiated:  No   Wound Care Orders initiated:  No      [facility] nurse consulted for Pressure Injury (Stage 3,4, Unstageable, DTI, NWPT, and Complex wounds), New and Established Ostomies:  No      Nurse 1 eSignature: Electronically signed by Ju Escamilla RN on 10/27/23 at 6:49 AM EDT    **SHARE this note so that the co-signing nurse is able to place an eSignature**    Nurse 2 eSignature: {Esignature:[id number]}
Awake and alert. Wife here at bedside. No complaints. VS stable. Meets PACU phase 2 discharge criteria. Waiting for transport.
Patient was admitted after midnight. Refer to H&P for details. Continue monitor H&H. Continue clear liquid diet per GI today.
Pt a/o. VSS. Shift assessment updated and documented. Brought from the ER in a stable condition , alert and oriented , vitals stable , no concerns raised by the patient , oriented to the room , call light with reach, bed locked in lowest position.
Pt a/o. VSS. Shift assessment updated and documented. Vitals rechecked and stables ,denies any new episodes of bleeding , scheduled medications administered. Pain needs addressed ,Standard safety measures in place , patient calls out appropriately. Will continue to monitor any bleeding concerns.
Pt a/o. VSS. Shift assessment updated and documented. Vitals stable , reported one bowel that was red at the start of the shift  ,no other concerns.  Will continue to monitor
Pt assessment completed and charted. VSS. Pt a/o. Pt denies pain/active bleeding. Pt ambulating independently. Bed in lowest position and wheels locked. Call light within reach. Bedside table within reach. Non-skid footwear in place. Pt denies any other needs at this time. Pt calls out appropriately.
Pt back in room. A&O x 4, VS stable. Assessment complete. Call light in reach.
Pt reports x2 bm - cherry red since returning from EGD. Pt endorses nausea. PRN medication administered per MAR. Returned Dr. Yessenia Lewis call. Updated pt status. No new orders.  Will continue to monitor H/H.
Pt returned from EGD. Pt lethargic, responds to voice. VSS. Pt c/o 2/10 pain. Pt assessment completed and charted. Wife @ bedside. Bed in lowest position and wheels locked. Call light within reach. Bedside table within reach. Non-skid footwear in place. Pt denies any other needs at this time. Pt calls out appropriately.
Received in PACU. Sleeping on left side. Respirations easy. Arouses easily to verbal stimuli. Offers no complaints.
Spoke w/ Dr. Asif Jackson, updated pt status - H/H - 11.6/34.9. Keep pt NPO after midnight for poss procedure. Updated nightshift RN and pt.
Transport here to take patient to room. I checked for bleeding from rectum - bed dry. Transferred to floor per cart.
[place]2.[place]    [facility] [place]      Name:  Sneha Howe /Age/Sex: 1980  (39 y.o. male)   MRN & CSN:  [id number] & [id number] Encounter Date/Time: 10/28/2023 2:44 PM EDT   Location:  [facility]/0324-01 PCP: MARGY Lopez MD       Hospital Day: 3    Assessment and Recommendations     Patient is a 41-year-old male past medical history of GERD, hypertension, hyperlipidemia, obesity, mood disorder, anxiety, ADHD, eczema, chronic back pain, who was admitted for post polypectomy GI bleed. #.  GI bleed status post polypectomy on 10/25/2023  #. Primary hypertension: Controlled  #. GERD  #. Hyperlipidemia  #. History of anxiety, mood disorder and ADHD    Plan:  Patient underwent colonoscopy and found to have bleeding from the proximal rectal area, status post Endo Clip on 10/28/2023  Patient underwent repeat lower endoscopy and found to have large amount of old blood mixed with stool but no active bleeding  Continue monitor H&H closely  Continue current medications for chronic medical problem  GI recommend watching 1 more night. DVT Prophylaxis: SCDs   code Status: Total Support. Barrier to DC: GI clearance          Subjective:     Patient was seen and examined. No acute events overnight. Patient nataly afebrile with stable vital signs. Review of Systems:      Pertinent positives and negatives discussed in HPI    Objective: Intake/Output Summary (Last 24 hours) at 10/28/2023 1444  Last data filed at 10/28/2023 0849  Gross per 24 hour   Intake 210 ml   Output --   Net 210 ml      Vitals:   Vitals:    10/28/23 0903 10/28/23 0908 10/28/23 0913 10/28/23 0918   BP: (!) 108/56 (!) 101/53 (!) 101/52 (!) 102/51   Pulse: 69 69 69 71   Resp: 16 16 16 16   Temp:       TempSrc:       SpO2: 93% 93% 92% 93%   Weight:       Height:             Physical Exam:      General: awake, alert, in NAD  Eyes: EOMI  ENT: neck supple  Cardiovascular: Regular rate.   Respiratory: Clear to
ALT 64*   BILITOT 0.3   ALKPHOS 105       Imaging Last 24 Hours:  No results found. Assessment//Plan           Hospital Problems             Last Modified POA    * (Principal) GI bleed 10/27/2023 Yes     Assessment & Plan  38 yo presented w hematochezia and active bleeding on CTA from the proximal rectum s/p CLS and polypectomy w hot-snare of a 9 mm polyp by Dr Marcus Lozano on 10/25. Patient has been taking NSAID's. Flex sig 10/27 revealed a bleeding site in proximal rectum necessitating 5 Endoclips. No bleeding since.   - OK to D/C home on daily PO Fe and daily Miralax  - He is to have his Labs re-drawn in 2-4 weeks  - Call us back if needed    Duane Florence, MD       (O) [phone]      Electronically signed by Duane Florence, MD on 10/29/23 at 9:29 AM EDT

## 2023-10-29 NOTE — DISCHARGE SUMMARY
V2.0  Discharge Summary    Name:  Joshua Streeter /Age/Sex: 1980 (39 y.o. male)   Admit Date: 10/26/2023  Discharge Date: 10/29/23    MRN & CSN:  [id number] & [id number] Encounter Date and Time 10/29/23 12:02 PM EDT    Attending:  Rebeka Rodriguez MD Discharging Provider: Rebeka Rodriguez MD       Hospital Course:     Patient is a 79-year-old male past medical history of hypertension, GERD, hyperlipidemia, obesity, mood disorder, anxiety, ADHD, eczema, chronic back pain who was admitted for GI bleed due to polypectomy on 10/25/2023. Patient was seen by GI. He underwent colonoscopy he was found to have bleeding from the proximal rectal area. Patient underwent Endo Clip on 10/20/2023. Patient had a repeat colonoscopy on 10/28/2023 and found to have large amount of blood mixed with stool but no active bleeding. Patient was kept overnight per GI recommendation. Patient hemoglobin has remained stable. He was then cleared by GI on iron supplements and MiraLAX. Since patient has benefited the most from hospitalization and since GI has cleared him for discharge, he will be discharged home in stable condition. Was advised to follow-up with his PCP and gastroenterologist for transition of care.     Discharge Instruction:     Primary care physician: MARGY Erazo CNP within 2 weeks  Diet: regular diet   Activity: activity as tolerated  Disposition: Discharged to:   [x]Home, [][facility], []SNF, []Acute Rehab, []Hospice   Condition on discharge: Stable    Discharge Medications:        Medication List        START taking these medications      ferrous sulfate 325 (65 Fe) MG tablet  Commonly known as: IRON 325  Take 1 tablet by mouth 2 times daily     lidocaine 4 % external patch  Place 1 patch onto the skin daily  Start taking on: 2023            Leigh Ann Velázquez taking these medications      amLODIPine 5 MG tablet  Commonly known as: NORVASC  Take 1-2 tablets by mouth daily     atomoxetine 40 MG

## 2023-10-30 ENCOUNTER — TELEPHONE (OUTPATIENT)
Dept: INTERNAL MEDICINE CLINIC | Age: 43
End: 2023-10-30

## 2023-10-30 NOTE — TELEPHONE ENCOUNTER
Care Transitions Initial Follow Up Call    Outreach made within 2 business days of discharge: Yes    Patient: Cheyenne Montes De Oca III Patient : 1980   MRN: [id number]  Reason for Admission: There are no discharge diagnoses documented for the most recent discharge. Discharge Date: 10/29/23       Spoke with: Patient    Discharge department/facility: [facility]    [facility] Interactive Patient Contact:  Was patient able to fill all prescriptions: Yes  Was patient instructed to bring all medications to the follow-up visit: Yes  Is patient taking all medications as directed in the discharge summary?  Yes  Does patient understand their discharge instructions: Yes  Does patient have questions or concerns that need addressed prior to 7-14 day follow up office visit: yes     Scheduled appointment with PCP within 7-14 days    Follow Up  Future Appointments   Date Time Provider [address] [place] [address]   10/31/2023  8:40 AM MARGY Chen -  [place], [place]

## 2023-10-31 ENCOUNTER — OFFICE VISIT (OUTPATIENT)
Dept: INTERNAL MEDICINE CLINIC | Age: 43
End: 2023-10-31

## 2023-10-31 VITALS
DIASTOLIC BLOOD PRESSURE: 82 MMHG | SYSTOLIC BLOOD PRESSURE: 138 MMHG | BODY MASS INDEX: 42.8 KG/M2 | TEMPERATURE: 98.4 F | OXYGEN SATURATION: 98 % | WEIGHT: 299 LBS | HEART RATE: 101 BPM | HEIGHT: 70 IN

## 2023-10-31 DIAGNOSIS — R41.840 ATTENTION DEFICIT: ICD-10-CM

## 2023-10-31 DIAGNOSIS — Z09 HOSPITAL DISCHARGE FOLLOW-UP: ICD-10-CM

## 2023-10-31 DIAGNOSIS — D62 ACUTE BLOOD LOSS ANEMIA: Primary | ICD-10-CM

## 2023-10-31 DIAGNOSIS — G89.29 CHRONIC BILATERAL LOW BACK PAIN, UNSPECIFIED WHETHER SCIATICA PRESENT: ICD-10-CM

## 2023-10-31 DIAGNOSIS — M54.50 CHRONIC BILATERAL LOW BACK PAIN, UNSPECIFIED WHETHER SCIATICA PRESENT: ICD-10-CM

## 2023-10-31 RX ORDER — ATOMOXETINE 60 MG/1
60 CAPSULE ORAL DAILY
Qty: 30 CAPSULE | Refills: 2 | Status: SHIPPED | OUTPATIENT
Start: 2023-10-31 | End: 2024-01-29

## 2023-10-31 RX ORDER — TRAMADOL HYDROCHLORIDE 50 MG/1
50 TABLET ORAL 2 TIMES DAILY PRN
Qty: 14 TABLET | Refills: 0 | Status: SHIPPED | OUTPATIENT
Start: 2023-10-31 | End: 2023-11-07

## 2023-10-31 NOTE — PROGRESS NOTES
(As obtained by patient/caregiver or practitioner observation)    Blood pressure-  Heart rate-    Respiratory rate-    Temperature-  Pulse oximetry-     Constitutional: [x] Appears well-developed and well-nourished [x] No apparent distress      [] Abnormal-   Mental status  [x] Alert and awake  [x] Oriented to person/place/time [x]Able to follow commands      Eyes:  EOM    [x]  Normal  [] Abnormal-  Sclera  [x]  Normal  [] Abnormal -         Discharge [x]  None visible  [] Abnormal -    HENT:   [x] Normocephalic, atraumatic. [] Abnormal   [x] Mouth/Throat: Mucous membranes are moist.     External Ears [x] Normal  [] Abnormal-     Neck: [x] No visualized mass     Pulmonary/Chest: [x] Respiratory effort normal.  [x] No visualized signs of difficulty breathing or respiratory distress        [] Abnormal-      Musculoskeletal:   [x] Normal gait with no signs of ataxia         [x] Normal range of motion of neck        [] Abnormal-       Neurological:        [x] No Facial Asymmetry (Cranial nerve 7 motor function) (limited exam to video visit)          [] No gaze palsy        [] Abnormal-         Skin:        [x] No significant exanthematous lesions or discoloration noted on facial skin         [] Abnormal-            Psychiatric:       [x] Normal Affect [x] No Hallucinations        [] Abnormal-     Other pertinent observable physical exam findings-         An electronic signature was used to authenticate this note.   --MARGY Olson - CNP

## 2023-11-20 ENCOUNTER — TELEPHONE (OUTPATIENT)
Dept: INTERNAL MEDICINE CLINIC | Age: 43
End: 2023-11-20

## 2023-11-20 RX ORDER — CEFDINIR 300 MG/1
300 CAPSULE ORAL 2 TIMES DAILY
Qty: 14 CAPSULE | Refills: 0 | Status: SHIPPED | OUTPATIENT
Start: 2023-11-20 | End: 2023-11-27

## 2023-11-20 NOTE — TELEPHONE ENCOUNTER
Patient's wife called and stated their daughter was just diagnosed with a sinus infection and was prescribed antibiotics. The family is going to be going out of the country on Friday. Patient's wife is requesting a prescription for antibiotics be sent to [facility] for patient just in case they get sick while out of the country.   Patient's wife can be reached at [phone]

## 2024-02-29 ENCOUNTER — PATIENT MESSAGE (OUTPATIENT)
Dept: INTERNAL MEDICINE CLINIC | Age: 44
End: 2024-02-29

## 2024-02-29 DIAGNOSIS — R41.840 ATTENTION DEFICIT: ICD-10-CM

## 2024-02-29 DIAGNOSIS — I10 HYPERTENSION, UNSPECIFIED TYPE: ICD-10-CM

## 2024-02-29 RX ORDER — AMLODIPINE BESYLATE 5 MG/1
5-10 TABLET ORAL DAILY
Qty: 180 TABLET | Refills: 0 | Status: SHIPPED | OUTPATIENT
Start: 2024-02-29

## 2024-02-29 RX ORDER — ATOMOXETINE 60 MG/1
60 CAPSULE ORAL DAILY
Qty: 90 CAPSULE | Refills: 3 | Status: SHIPPED | OUTPATIENT
Start: 2024-02-29 | End: 2025-02-23

## 2024-02-29 RX ORDER — LOSARTAN POTASSIUM 100 MG/1
100 TABLET ORAL DAILY
Qty: 90 TABLET | Refills: 3 | Status: SHIPPED | OUTPATIENT
Start: 2024-02-29

## 2024-02-29 NOTE — TELEPHONE ENCOUNTER
Spoke to patient.  He will call back to schedule an appointment for End of May beginning of June as soon as he has his work schedule

## 2024-02-29 NOTE — TELEPHONE ENCOUNTER
Refill request for  medication.     LOSARTAN 100 MG   ATOMOXETINE 60 MG   AMLODIPINE 5 MG     Name of [facility]acy- [facility]       Last visit - 10/31/2023     Pending visit - NONE     Last refill -12/18/2023; 10/31/2023

## 2024-04-10 ENCOUNTER — OFFICE VISIT (OUTPATIENT)
Dept: INTERNAL MEDICINE CLINIC | Age: 44
End: 2024-04-10
Payer: COMMERCIAL

## 2024-04-10 VITALS
SYSTOLIC BLOOD PRESSURE: 132 MMHG | HEIGHT: 70 IN | TEMPERATURE: 97 F | BODY MASS INDEX: 43.38 KG/M2 | HEART RATE: 89 BPM | WEIGHT: 303 LBS | OXYGEN SATURATION: 98 % | DIASTOLIC BLOOD PRESSURE: 88 MMHG

## 2024-04-10 DIAGNOSIS — N64.4 NIPPLE TENDERNESS: Primary | ICD-10-CM

## 2024-04-10 PROCEDURE — 3079F DIAST BP 80-89 MM HG: CPT | Performed by: NURSE PRACTITIONER

## 2024-04-10 PROCEDURE — 3075F SYST BP GE 130 - 139MM HG: CPT | Performed by: NURSE PRACTITIONER

## 2024-04-10 PROCEDURE — 99213 OFFICE O/P EST LOW 20 MIN: CPT | Performed by: NURSE PRACTITIONER

## 2024-04-10 RX ORDER — MELOXICAM 15 MG/1
15 TABLET ORAL DAILY
COMMUNITY

## 2024-04-10 ASSESSMENT — PATIENT HEALTH QUESTIONNAIRE - PHQ9
SUM OF ALL RESPONSES TO PHQ QUESTIONS 1-9: 0
SUM OF ALL RESPONSES TO PHQ QUESTIONS 1-9: 0
1. LITTLE INTEREST OR PLEASURE IN DOING THINGS: NOT AT ALL
SUM OF ALL RESPONSES TO PHQ9 QUESTIONS 1 & 2: 0
2. FEELING DOWN, DEPRESSED OR HOPELESS: NOT AT ALL
SUM OF ALL RESPONSES TO PHQ QUESTIONS 1-9: 0
SUM OF ALL RESPONSES TO PHQ QUESTIONS 1-9: 0

## 2024-04-10 ASSESSMENT — ENCOUNTER SYMPTOMS
SINUS PAIN: 0
DIARRHEA: 0
NAUSEA: 0
COUGH: 0
SHORTNESS OF BREATH: 0
SORE THROAT: 0
CHEST TIGHTNESS: 0
CONSTIPATION: 0
VOMITING: 0

## 2024-04-10 NOTE — PROGRESS NOTES
symmetrical.      Right: Tenderness (Subareolar tenderness with palpation) present. No swelling, inverted nipple, nipple discharge or skin change.      Left: No swelling, inverted nipple, nipple discharge, skin change or tenderness.   Abdominal:      General: Bowel sounds are normal. There is no distension.      Palpations: Abdomen is soft.      Tenderness: There is no abdominal tenderness. There is no rebound.   Musculoskeletal:         General: Normal range of motion.      Cervical back: Full passive range of motion without pain, normal range of motion and neck supple.   Lymphadenopathy:      Upper Body:      Right upper body: No supraclavicular, axillary or pectoral adenopathy.      Left upper body: No supraclavicular, axillary or pectoral adenopathy.   Skin:     General: Skin is warm and dry.   Neurological:      Cranial Nerves: No cranial nerve deficit.   Psychiatric:         Speech: Speech normal.         Behavior: Behavior normal.         Thought Content: Thought content normal.         Judgment: Judgment normal.         Assessment & Plan:     The following diagnoses and conditions are stable with no further orders unless indicated:    1. Nipple tenderness        Tyron was seen today for other.    Diagnoses and all orders for this visit:    Nipple tenderness    Suspect nipple tenderness likely secondary to transient inflammation. Consider possible gynecomastia 2/2 to spironolactone use as alternative as well. Recommend warm compresses to alleviate tenderness/mild swelling. If continued swelling and/or color changes likely will need abx for possible mastitis. Continue NSAID for pain. Consider mammogram if symptoms persistent as well    No follow-ups on file.      Patientshould call the office immediately with new or ongoing signs or symptoms or worsening, or proceed to the emergency room.  If you are on medications which could impair your senses, you are at risk of weakness, falls,dizziness, or drowsiness.

## 2024-04-22 ENCOUNTER — OFFICE VISIT (OUTPATIENT)
Dept: INTERNAL MEDICINE CLINIC | Age: 44
End: 2024-04-22
Payer: COMMERCIAL

## 2024-04-22 VITALS
OXYGEN SATURATION: 96 % | WEIGHT: 300.8 LBS | TEMPERATURE: 97.9 F | DIASTOLIC BLOOD PRESSURE: 92 MMHG | SYSTOLIC BLOOD PRESSURE: 154 MMHG | BODY MASS INDEX: 43.16 KG/M2 | HEART RATE: 109 BPM

## 2024-04-22 DIAGNOSIS — M54.41 ACUTE RIGHT-SIDED LOW BACK PAIN WITH RIGHT-SIDED SCIATICA: Primary | ICD-10-CM

## 2024-04-22 PROCEDURE — 3080F DIAST BP >= 90 MM HG: CPT | Performed by: NURSE PRACTITIONER

## 2024-04-22 PROCEDURE — 3077F SYST BP >= 140 MM HG: CPT | Performed by: NURSE PRACTITIONER

## 2024-04-22 PROCEDURE — 99213 OFFICE O/P EST LOW 20 MIN: CPT | Performed by: NURSE PRACTITIONER

## 2024-04-22 RX ORDER — METHOCARBAMOL 750 MG/1
750 TABLET, FILM COATED ORAL 4 TIMES DAILY
Qty: 40 TABLET | Refills: 0 | Status: SHIPPED | OUTPATIENT
Start: 2024-04-22 | End: 2024-05-02

## 2024-04-22 RX ORDER — CHLORTHALIDONE 25 MG/1
25 TABLET ORAL DAILY
COMMUNITY

## 2024-04-22 RX ORDER — PREDNISONE 20 MG/1
TABLET ORAL
Qty: 19 TABLET | Refills: 0 | Status: SHIPPED | OUTPATIENT
Start: 2024-04-22

## 2024-04-22 ASSESSMENT — ENCOUNTER SYMPTOMS
COUGH: 0
DIARRHEA: 0
NAUSEA: 0
FACIAL SWELLING: 0
SINUS PRESSURE: 0
BACK PAIN: 1
SORE THROAT: 0
VOMITING: 0

## 2024-04-22 NOTE — PATIENT INSTRUCTIONS
Could consider Ozempic, Mounjaro or Zepbound for weight loss then can have a conversation with Saul once you know cost and insurance coverage.     Prednisone taper - sent prescription    Ice/heat as needed

## 2024-05-05 DIAGNOSIS — E78.41 ELEVATED LIPOPROTEIN(A): ICD-10-CM

## 2024-05-06 RX ORDER — ATORVASTATIN CALCIUM 40 MG/1
40 TABLET, FILM COATED ORAL DAILY
Qty: 30 TABLET | Refills: 10 | Status: SHIPPED | OUTPATIENT
Start: 2024-05-06 | End: 2025-05-01

## 2024-05-06 NOTE — TELEPHONE ENCOUNTER
Refill request for Atorvastatin Calcium 40 mg  medication.     Name of Pharmacy- [facility]       Last visit - 4/22/24     Pending visit - 5/23/24    Last refill -4/21/23      Medication Contract signed -   Last Oarrs ran-         Additional Comments

## 2024-05-07 DIAGNOSIS — Z00.00 WELL ADULT EXAM: Primary | ICD-10-CM

## 2024-05-09 ENCOUNTER — HOSPITAL ENCOUNTER (OUTPATIENT)
Dept: GENERAL RADIOLOGY | Age: 44
Discharge: HOME | End: 2024-05-09
Payer: COMMERCIAL

## 2024-05-09 ENCOUNTER — HOSPITAL ENCOUNTER (OUTPATIENT)
Age: 44
Discharge: HOME | End: 2024-05-09
Payer: COMMERCIAL

## 2024-05-09 DIAGNOSIS — M25.551 RIGHT HIP PAIN: ICD-10-CM

## 2024-05-09 DIAGNOSIS — M54.50 LOW BACK PAIN, UNSPECIFIED BACK PAIN LATERALITY, UNSPECIFIED CHRONICITY, UNSPECIFIED WHETHER SCIATICA PRESENT: ICD-10-CM

## 2024-05-09 LAB
ALBUMIN SERPL-MCNC: 4.5 G/DL (ref 3.4–5)
ALBUMIN/GLOB SERPL: 1.3 {RATIO} (ref 1.1–2.2)
ALP SERPL-CCNC: 93 U/L (ref 40–129)
ALT SERPL-CCNC: 55 U/L (ref 10–40)
ANION GAP SERPL CALCULATED.3IONS-SCNC: 15 MMOL/L (ref 3–16)
AST SERPL-CCNC: 38 U/L (ref 15–37)
BASOPHILS # BLD: 0 K/UL (ref 0–0.2)
BASOPHILS NFR BLD: 0.6 %
BILIRUB SERPL-MCNC: 0.4 MG/DL (ref 0–1)
BUN SERPL-MCNC: 22 MG/DL (ref 7–20)
CALCIUM SERPL-MCNC: 9.5 MG/DL (ref 8.3–10.6)
CHLORIDE SERPL-SCNC: 97 MMOL/L (ref 99–110)
CHOLEST SERPL-MCNC: 219 MG/DL (ref 0–199)
CO2 SERPL-SCNC: 26 MMOL/L (ref 21–32)
CREAT SERPL-MCNC: 1.1 MG/DL (ref 0.9–1.3)
DEPRECATED RDW RBC AUTO: 16.8 % (ref 12.4–15.4)
EOSINOPHIL # BLD: 0.2 K/UL (ref 0–0.6)
EOSINOPHIL NFR BLD: 2.6 %
GFR SERPLBLD CREATININE-BSD FMLA CKD-EPI: 84 ML/MIN/{1.73_M2}
GLUCOSE SERPL-MCNC: 116 MG/DL (ref 70–99)
HCT VFR BLD AUTO: 43 % (ref 40.5–52.5)
HDLC SERPL-MCNC: 38 MG/DL (ref 40–60)
HGB BLD-MCNC: 14.3 G/DL (ref 13.5–17.5)
LDLC SERPL CALC-MCNC: 146 MG/DL
LYMPHOCYTES # BLD: 1.8 K/UL (ref 1–5.1)
LYMPHOCYTES NFR BLD: 25.3 %
MCH RBC QN AUTO: 25.2 PG (ref 26–34)
MCHC RBC AUTO-ENTMCNC: 33.4 G/DL (ref 31–36)
MCV RBC AUTO: 75.5 FL (ref 80–100)
MONOCYTES # BLD: 0.5 K/UL (ref 0–1.3)
MONOCYTES NFR BLD: 7.6 %
NEUTROPHILS # BLD: 4.5 K/UL (ref 1.7–7.7)
NEUTROPHILS NFR BLD: 63.9 %
PLATELET # BLD AUTO: 212 K/UL (ref 135–450)
PMV BLD AUTO: 9.6 FL (ref 5–10.5)
POTASSIUM SERPL-SCNC: 3.6 MMOL/L (ref 3.5–5.1)
PROT SERPL-MCNC: 7.9 G/DL (ref 6.4–8.2)
RBC # BLD AUTO: 5.7 M/UL (ref 4.2–5.9)
SODIUM SERPL-SCNC: 138 MMOL/L (ref 136–145)
TRIGL SERPL-MCNC: 175 MG/DL (ref 0–150)
TSH SERPL DL<=0.005 MIU/L-ACNC: 0.48 UIU/ML (ref 0.27–4.2)
VLDLC SERPL CALC-MCNC: 35 MG/DL
WBC # BLD AUTO: 7 K/UL (ref 4–11)

## 2024-05-09 PROCEDURE — 84443 ASSAY THYROID STIM HORMONE: CPT

## 2024-05-09 PROCEDURE — 72120 X-RAY BEND ONLY L-S SPINE: CPT

## 2024-05-09 PROCEDURE — 72100 X-RAY EXAM L-S SPINE 2/3 VWS: CPT

## 2024-05-09 PROCEDURE — 73502 X-RAY EXAM HIP UNI 2-3 VIEWS: CPT

## 2024-05-09 PROCEDURE — 80061 LIPID PANEL: CPT

## 2024-05-09 PROCEDURE — 80053 COMPREHEN METABOLIC PANEL: CPT

## 2024-05-09 PROCEDURE — 85025 COMPLETE CBC W/AUTO DIFF WBC: CPT

## 2024-05-09 PROCEDURE — 36415 COLL VENOUS BLD VENIPUNCTURE: CPT

## 2024-05-09 PROCEDURE — 83036 HEMOGLOBIN GLYCOSYLATED A1C: CPT

## 2024-05-10 LAB
EST. AVERAGE GLUCOSE BLD GHB EST-MCNC: 148.5 MG/DL
HBA1C MFR BLD: 6.8 %

## 2024-05-13 DIAGNOSIS — E11.65 TYPE 2 DIABETES MELLITUS WITH HYPERGLYCEMIA, WITHOUT LONG-TERM CURRENT USE OF INSULIN (HCC): Primary | ICD-10-CM

## 2024-05-23 ENCOUNTER — OFFICE VISIT (OUTPATIENT)
Dept: INTERNAL MEDICINE CLINIC | Age: 44
End: 2024-05-23
Payer: COMMERCIAL

## 2024-05-23 VITALS
TEMPERATURE: 97 F | HEART RATE: 92 BPM | WEIGHT: 291.2 LBS | BODY MASS INDEX: 41.69 KG/M2 | HEIGHT: 70 IN | OXYGEN SATURATION: 97 % | SYSTOLIC BLOOD PRESSURE: 144 MMHG | DIASTOLIC BLOOD PRESSURE: 82 MMHG

## 2024-05-23 DIAGNOSIS — G56.00 CARPAL TUNNEL SYNDROME, UNSPECIFIED LATERALITY: ICD-10-CM

## 2024-05-23 DIAGNOSIS — I10 PRIMARY HYPERTENSION: ICD-10-CM

## 2024-05-23 DIAGNOSIS — E78.41 ELEVATED LIPOPROTEIN(A): Primary | ICD-10-CM

## 2024-05-23 PROBLEM — K92.2 GI BLEED: Status: RESOLVED | Noted: 2023-10-27 | Resolved: 2024-05-23

## 2024-05-23 PROCEDURE — 3077F SYST BP >= 140 MM HG: CPT | Performed by: NURSE PRACTITIONER

## 2024-05-23 PROCEDURE — 3079F DIAST BP 80-89 MM HG: CPT | Performed by: NURSE PRACTITIONER

## 2024-05-23 PROCEDURE — 99214 OFFICE O/P EST MOD 30 MIN: CPT | Performed by: NURSE PRACTITIONER

## 2024-05-23 RX ORDER — POTASSIUM CHLORIDE 750 MG/1
10 TABLET, EXTENDED RELEASE ORAL DAILY
COMMUNITY

## 2024-05-23 RX ORDER — ROSUVASTATIN CALCIUM 20 MG/1
20 TABLET, COATED ORAL DAILY
Qty: 30 TABLET | Refills: 5 | Status: SHIPPED | OUTPATIENT
Start: 2024-05-23

## 2024-05-23 ASSESSMENT — ENCOUNTER SYMPTOMS
DIARRHEA: 0
SORE THROAT: 0
COUGH: 0
CHEST TIGHTNESS: 0
CONSTIPATION: 0
VOMITING: 0
SHORTNESS OF BREATH: 0
SINUS PAIN: 0
NAUSEA: 0

## 2024-05-23 NOTE — PROGRESS NOTES
insufficiency    Routine administration of higher-dose, long-acting metoprolol in beta-blocker-naïve patients on the day of surgery, and in the absence of dose titration is associated with an overall increase in mortality.  Beta-blockers should be started days to weeks prior to surgery and titrated to pulse < 70.  4. Deep vein thrombosis prophylaxis: regimen to be chosen by surgical team  5. No contraindications to planned surgery        MARGY Cannon - CNP

## 2024-07-14 DIAGNOSIS — I10 HYPERTENSION, UNSPECIFIED TYPE: ICD-10-CM

## 2024-07-15 RX ORDER — AMLODIPINE BESYLATE 5 MG/1
5-10 TABLET ORAL DAILY
Qty: 180 TABLET | Refills: 0 | Status: SHIPPED | OUTPATIENT
Start: 2024-07-15

## 2024-10-07 ENCOUNTER — OFFICE VISIT (OUTPATIENT)
Dept: INTERNAL MEDICINE CLINIC | Age: 44
End: 2024-10-07
Payer: COMMERCIAL

## 2024-10-07 VITALS
BODY MASS INDEX: 42.37 KG/M2 | DIASTOLIC BLOOD PRESSURE: 82 MMHG | OXYGEN SATURATION: 96 % | SYSTOLIC BLOOD PRESSURE: 128 MMHG | TEMPERATURE: 97.9 F | HEART RATE: 88 BPM | HEIGHT: 70 IN | WEIGHT: 296 LBS

## 2024-10-07 DIAGNOSIS — Z23 NEED FOR INFLUENZA VACCINATION: ICD-10-CM

## 2024-10-07 DIAGNOSIS — J02.9 PHARYNGITIS, UNSPECIFIED ETIOLOGY: Primary | ICD-10-CM

## 2024-10-07 PROCEDURE — 3079F DIAST BP 80-89 MM HG: CPT | Performed by: NURSE PRACTITIONER

## 2024-10-07 PROCEDURE — 99213 OFFICE O/P EST LOW 20 MIN: CPT | Performed by: NURSE PRACTITIONER

## 2024-10-07 PROCEDURE — 3074F SYST BP LT 130 MM HG: CPT | Performed by: NURSE PRACTITIONER

## 2024-10-07 PROCEDURE — 90661 CCIIV3 VAC ABX FR 0.5 ML IM: CPT | Performed by: NURSE PRACTITIONER

## 2024-10-07 PROCEDURE — 90471 IMMUNIZATION ADMIN: CPT | Performed by: NURSE PRACTITIONER

## 2024-10-07 SDOH — ECONOMIC STABILITY: FOOD INSECURITY: WITHIN THE PAST 12 MONTHS, YOU WORRIED THAT YOUR FOOD WOULD RUN OUT BEFORE YOU GOT MONEY TO BUY MORE.: NEVER TRUE

## 2024-10-07 SDOH — ECONOMIC STABILITY: FOOD INSECURITY: WITHIN THE PAST 12 MONTHS, THE FOOD YOU BOUGHT JUST DIDN'T LAST AND YOU DIDN'T HAVE MONEY TO GET MORE.: NEVER TRUE

## 2024-10-07 SDOH — ECONOMIC STABILITY: INCOME INSECURITY: HOW HARD IS IT FOR YOU TO PAY FOR THE VERY BASICS LIKE FOOD, HOUSING, MEDICAL CARE, AND HEATING?: NOT HARD AT ALL

## 2024-10-07 ASSESSMENT — ENCOUNTER SYMPTOMS
SHORTNESS OF BREATH: 0
NAUSEA: 0
CONSTIPATION: 0
SWOLLEN GLANDS: 0
SINUS PAIN: 0
SORE THROAT: 1
VOICE CHANGE: 1
ABDOMINAL PAIN: 0
COUGH: 0
VOMITING: 0
DIARRHEA: 0
CHEST TIGHTNESS: 0

## 2024-10-07 NOTE — PROGRESS NOTES
thyroid mass, thyromegaly or thyroid tenderness.      Vascular: Normal carotid pulses. No carotid bruit or JVD.   Cardiovascular:      Rate and Rhythm: Normal rate and regular rhythm. No extrasystoles are present.     Chest Wall: PMI is not displaced.      Pulses:           Radial pulses are 2+ on the right side and 2+ on the left side.        Dorsalis pedis pulses are 2+ on the right side and 2+ on the left side.      Heart sounds: Normal heart sounds, S1 normal and S2 normal. Heart sounds not distant. No murmur heard.     No friction rub. No gallop. No S3 or S4 sounds.   Pulmonary:      Effort: Pulmonary effort is normal. No respiratory distress.      Breath sounds: Normal breath sounds. No decreased breath sounds, wheezing, rhonchi or rales.   Abdominal:      General: Bowel sounds are normal. There is no distension.      Palpations: Abdomen is soft.      Tenderness: There is no abdominal tenderness. There is no rebound.   Musculoskeletal:         General: Normal range of motion.      Cervical back: Full passive range of motion without pain, normal range of motion and neck supple.   Lymphadenopathy:      Head:      Right side of head: No submental, submandibular or tonsillar adenopathy.      Left side of head: No submental, submandibular or tonsillar adenopathy.   Skin:     General: Skin is warm and dry.   Neurological:      Cranial Nerves: No cranial nerve deficit.   Psychiatric:         Speech: Speech normal.         Behavior: Behavior normal.         Thought Content: Thought content normal.         Judgment: Judgment normal.         Assessment & Plan:     The following diagnoses and conditions are stable with no further orders unless indicated:    1. Pharyngitis, unspecified etiology    2. Need for influenza vaccination        Tyron was seen today for other.    Diagnoses and all orders for this visit:    Pharyngitis, unspecified etiology    Likely due to reactive post nasal drip however cannot exclude silent

## 2024-10-27 DIAGNOSIS — I10 HYPERTENSION, UNSPECIFIED TYPE: ICD-10-CM

## 2024-10-28 RX ORDER — AMLODIPINE BESYLATE 5 MG/1
5-10 TABLET ORAL DAILY
Qty: 180 TABLET | Refills: 3 | Status: SHIPPED | OUTPATIENT
Start: 2024-10-28

## 2024-10-28 NOTE — TELEPHONE ENCOUNTER
Refill request for Amlodipine ( Norvasc) 5 mg  medication.     Name of Pharmacy- [facility]      Last visit - 10/7/024     Pending visit - 11/7/24    Last refill -7/15/24      Medication Contract signed -   Last Oarrs ran-        Additional Comments

## 2024-11-07 ENCOUNTER — OFFICE VISIT (OUTPATIENT)
Dept: INTERNAL MEDICINE CLINIC | Age: 44
End: 2024-11-07
Payer: COMMERCIAL

## 2024-11-07 VITALS
HEIGHT: 70 IN | BODY MASS INDEX: 43.67 KG/M2 | HEART RATE: 88 BPM | OXYGEN SATURATION: 97 % | TEMPERATURE: 98.2 F | DIASTOLIC BLOOD PRESSURE: 88 MMHG | WEIGHT: 305 LBS | SYSTOLIC BLOOD PRESSURE: 130 MMHG

## 2024-11-07 DIAGNOSIS — E11.65 TYPE 2 DIABETES MELLITUS WITH HYPERGLYCEMIA, WITHOUT LONG-TERM CURRENT USE OF INSULIN (HCC): Primary | ICD-10-CM

## 2024-11-07 DIAGNOSIS — E66.813 CLASS 3 SEVERE OBESITY DUE TO EXCESS CALORIES WITH SERIOUS COMORBIDITY AND BODY MASS INDEX (BMI) OF 40.0 TO 44.9 IN ADULT: ICD-10-CM

## 2024-11-07 DIAGNOSIS — E66.01 CLASS 3 SEVERE OBESITY DUE TO EXCESS CALORIES WITH SERIOUS COMORBIDITY AND BODY MASS INDEX (BMI) OF 40.0 TO 44.9 IN ADULT: ICD-10-CM

## 2024-11-07 DIAGNOSIS — K21.9 GASTROESOPHAGEAL REFLUX DISEASE WITHOUT ESOPHAGITIS: ICD-10-CM

## 2024-11-07 PROCEDURE — 3079F DIAST BP 80-89 MM HG: CPT | Performed by: NURSE PRACTITIONER

## 2024-11-07 PROCEDURE — 3044F HG A1C LEVEL LT 7.0%: CPT | Performed by: NURSE PRACTITIONER

## 2024-11-07 PROCEDURE — 99214 OFFICE O/P EST MOD 30 MIN: CPT | Performed by: NURSE PRACTITIONER

## 2024-11-07 PROCEDURE — 3075F SYST BP GE 130 - 139MM HG: CPT | Performed by: NURSE PRACTITIONER

## 2024-11-07 RX ORDER — MELOXICAM 15 MG/1
15 TABLET ORAL DAILY
Qty: 90 TABLET | Refills: 1 | Status: SHIPPED | OUTPATIENT
Start: 2024-11-07

## 2024-11-07 RX ORDER — PANTOPRAZOLE SODIUM 20 MG/1
20-40 TABLET, DELAYED RELEASE ORAL
Qty: 60 TABLET | Refills: 2 | Status: SHIPPED | OUTPATIENT
Start: 2024-11-07

## 2024-11-07 ASSESSMENT — ENCOUNTER SYMPTOMS
SINUS PAIN: 0
NAUSEA: 0
SORE THROAT: 0
DIARRHEA: 0
BACK PAIN: 1
COUGH: 0
SHORTNESS OF BREATH: 0
CHEST TIGHTNESS: 0
VOMITING: 0
CONSTIPATION: 0

## 2024-11-07 NOTE — PROGRESS NOTES
Avoid starches such as bread, rice, potatoes, pasta and all sources of simple sugars (desserts, soda, breakfast cereals).  - Eliminate highly processed, palatable foods which is are riddled with chemicals and processed sugars   - Choose beverages that are calorie and sugar free.    Gastroesophageal reflux disease without esophagitis  -     pantoprazole (PROTONIX) 20 MG tablet; Take 1-2 tablets by mouth every morning (before breakfast)    GERD diet: avoid fried and fatty foods. peppermint, chocolate, alcohol, coffee, citrus fruits and juices, tomato products; avoid lying down for 2 to 3 hours after eating, avoid tight fitting clothing.  Weight loss frequent helps heartburn/reflux especially with the presence of a hiatal hernia.    Other orders  -     meloxicam (MOBIC) 15 MG tablet; Take 1 tablet by mouth daily        No follow-ups on file.      Patientshould call the office immediately with new or ongoing signs or symptoms or worsening, or proceed to the emergency room.  If you are on medications which could impair your senses, you are at risk of weakness, falls,dizziness, or drowsiness.  You should be careful during activities which could place you at risk of harm, such as climbing, using stairs, operating machinery, or driving vehicles.  If you feel you cannot safely do theseactivities, you should request others to help you, or avoid the activities altogether. If you are drowsy for any other reason, you should use the same precautions as listed above.    Call if pattern of symptoms change or persists for an extended time.      Saul Ferrari,FANYC

## 2024-11-08 ENCOUNTER — OFFICE VISIT (OUTPATIENT)
Dept: PSYCHOLOGY | Age: 44
End: 2024-11-08
Payer: COMMERCIAL

## 2024-11-08 DIAGNOSIS — F43.21 ADJUSTMENT DISORDER WITH DEPRESSED MOOD: ICD-10-CM

## 2024-11-08 DIAGNOSIS — F41.1 GAD (GENERALIZED ANXIETY DISORDER): Primary | ICD-10-CM

## 2024-11-08 PROCEDURE — 90791 PSYCH DIAGNOSTIC EVALUATION: CPT | Performed by: SOCIAL WORKER

## 2024-11-08 NOTE — PROGRESS NOTES
Behavioral Health Consultation  Nica HUYEN Anglin  11/8/2024  12:32 PM      Time spent with Patient: 45 minutes  This is patient's first  [facility] appointment.    Reason for Consult:  depression and anxiety  Referring Provider: Salu Ferrari APRN - CNP  [address] [facility]  [address]  [place] [address]    Pt provided informed consent for the behavioral health program. Discussed with patient model of service to include the limits of confidentiality (i.e. abuse reporting, suicide intervention, etc.) and short-term intervention focused approach.  Pt indicated understanding.  Feedback given to PCP.    S:  Patient presents with concerns about weight gain.  Genetically always struggled with weight and never been thin.  In 2007 had back injury, threw it out moving a piano.  Nothing major as result but 2019 had a fall in baseball game. 2021 had aother hsarp back pain, and trouble walking and back spasms.  In 2023 cervical disc replacement and lumbar surgery. Gained 50lbs back after this.  At the heaviest has ever been.  Also has tendonitis in both ankles. Had many medical issues standing in way of improving health.      Struggles with depression and anxiety as result of health issues. Sleep has been compromised.  No SI/  , with two kids.  Very happy family life.  Some stress with work, contract renewals are stressful.  No etoh use, no drug use, no smoking.      Open to specialty mental health but would like to start with [facility]. Looking for help with depression, anxiety, stress and weight loss..       O:  MSE:    Appearance: good hygiene   Attitude: cooperative and friendly  Consciousness: alert  Orientation: oriented to person, place, time, general circumstance  Memory: recent and remote memory intact  Attention/Concentration: intact during session  Psychomotor Activity:normal  Eye Contact: normal  Speech: normal rate and volume, well-articulated  Mood: anxious and dysphoric  Affect: euthymic  Perception: within

## 2024-11-08 NOTE — PATIENT INSTRUCTIONS
The power vulnerability and listening to shame-shahrzad talks  [facility]-back pain with rosita Palumbo  Try the breath work below      Deep Breathing Exercises      Exercise 1:  The Stimulating Breath (also called the Dalia Breath)   Its aim is to raise energy level and increase alertness.  Inhale and exhale rapidly through your nose, keeping your mouth closed but relaxed. Your breaths in and out should be equal in duration, but as short as possible. This is a noisy breathing exercise.  Try for three in-and-out breath cycles per second. This produces a quick movement of the diaphragm, suggesting a dalia. Breathe normally after each cycle.  Do not do for more than 15 seconds on your first try. Each time you practice the Stimulating Breath, you can increase your time by five seconds or so, until you reach a full minute.  If done properly, you may feel invigorated, comparable to the heightened awareness you feel after a good workout. You should feel the effort at the back of the neck, the diaphragm, the chest and the abdomen. Try this breathing exercise the next time you need an energy boost and feel yourself reaching for a cup of coffee.    Exercise 2:  The 4-7-8 (or Relaxing Breath) Exercise  This exercise is utterly simple, takes almost no time, requires no equipment and can be done anywhere. Although you can do the exercise in any position, sit with your back straight while learning the exercise. Place the tip of your tongue against the ridge of tissue just behind your upper front teeth, and keep it there through the entire exercise. You will be exhaling through your mouth around your tongue; try pursing your lips slightly if this seems awkward.  Exhale completely through your mouth, making a whoosh sound.  Close your mouth and inhale quietly through your nose to a mental count of four.  Hold your breath for a count of seven.  Exhale completely through your mouth, making a whoosh sound to a count of eight.  This

## 2024-11-12 ENCOUNTER — TELEPHONE (OUTPATIENT)
Dept: ADMINISTRATIVE | Age: 44
End: 2024-11-12

## 2024-11-12 NOTE — TELEPHONE ENCOUNTER
Submitted PA for Ozempic (0.25 or 0.5 MG/DOSE) 2MG/3ML pen-injectors  [facility] [id number] STATUS: PENDING.    Follow up done daily; if no decision with in three days we will refax.  If another three days goes by with no decision will call the insurance for status.

## 2024-11-13 ENCOUNTER — PATIENT MESSAGE (OUTPATIENT)
Dept: INTERNAL MEDICINE CLINIC | Age: 44
End: 2024-11-13

## 2024-11-13 DIAGNOSIS — E04.1 THYROID NODULE: Primary | ICD-10-CM

## 2024-11-13 DIAGNOSIS — E04.2 MULTINODULAR GOITER: Primary | ICD-10-CM

## 2024-11-13 NOTE — TELEPHONE ENCOUNTER
The medication is APPROVED THROUGH 11/12/2025.    If this requires a response please respond to the pool ( P MHCX [facility] MEDICATION PRE-AUTH).      Thank you please advise patient.

## 2024-11-14 ENCOUNTER — HOSPITAL ENCOUNTER (OUTPATIENT)
Dept: ULTRASOUND IMAGING | Age: 44
Discharge: HOME | End: 2024-11-14
Payer: COMMERCIAL

## 2024-11-14 DIAGNOSIS — E04.1 THYROID NODULE: ICD-10-CM

## 2024-11-14 PROCEDURE — 76536 US EXAM OF HEAD AND NECK: CPT

## 2024-11-18 NOTE — TELEPHONE ENCOUNTER
Sent patient a message via [employer] and also called and left a message for him to call back. Do you want me to pend a referral or wait until he gets back with us at the office?

## 2024-11-18 NOTE — TELEPHONE ENCOUNTER
Patient returned call to the office and stated he has already set up an appointment for December 11 with Dr. Rafal Moura at [facility].     Pended referral to [facility].

## 2024-11-18 NOTE — TELEPHONE ENCOUNTER
Call #1    Called and Left message for patient to call back to give him the referral information about the ENT.

## 2024-12-06 ENCOUNTER — OFFICE VISIT (OUTPATIENT)
Dept: PSYCHOLOGY | Age: 44
End: 2024-12-06
Payer: COMMERCIAL

## 2024-12-06 DIAGNOSIS — F43.21 ADJUSTMENT DISORDER WITH DEPRESSED MOOD: ICD-10-CM

## 2024-12-06 DIAGNOSIS — F41.1 GAD (GENERALIZED ANXIETY DISORDER): Primary | ICD-10-CM

## 2024-12-06 PROCEDURE — 90837 PSYTX W PT 60 MINUTES: CPT | Performed by: SOCIAL WORKER

## 2024-12-06 NOTE — PROGRESS NOTES
Behavioral Health Consultation  HUYEN Zaidi  12/6/2024  5:50 PM      Time spent with Patient: 60 minutes  This is patient's second  [facility] appointment.    Reason for Consult:  depression and anxiety  Referring Provider: Saul Ferrari APRN - CNP  [address] [id number]    Pt provided informed consent for the behavioral health program. Discussed with patient model of service to include the limits of confidentiality (i.e. abuse reporting, suicide intervention, etc.) and short-term intervention focused approach.  Pt indicated understanding.  Feedback given to PCP.    S:  Pt struggling with continued barriers.  Trouble identifying what screening his anxiety and some of his depression.  Realizing at times that sometimes his authentic self does not always align with what his face looks like.  Often times people assume he is happy however feels quite depressed inside.  Does attribute some of his symptoms to ADHD.  Sleep can also be problematic.  Overall happy with his life so oftentimes feels shame for feeling symptoms of depression.  Not interested in seeing psychiatry at this point.  Also not interested in specialty mental health.  Continued struggles with medical issues which has been the biggest recent barriers.  Would like to get weight loss surgery but unable to do this until he is cleared medically.  Feeling like professionally has barriers.        O:  MSE:    Appearance: good hygiene   Attitude: cooperative and friendly  Consciousness: alert  Orientation: oriented to person, place, time, general circumstance  Memory: recent and remote memory intact  Attention/Concentration: intact during session  Psychomotor Activity:normal  Eye Contact: normal  Speech: normal rate and volume, well-articulated  Mood: Dysphoric and anxious  Affect: anxious  Perception: within normal limits  Thought Content: within normal limits  Thought Process: logical, coherent and goal-directed  Insight:

## 2024-12-06 NOTE — PATIENT INSTRUCTIONS
[facility]: Girish Juanis on metabolic health  [facility]  Consider reading the \"gifts of imperfection\" and the \"stoic challenge\"-adelina Benson  [facility]: Alysia Irizarry  Return to see Nica in 6 weeks.      Deep Breathing Exercises      Exercise 1:  The Stimulating Breath (also called the Dalia Breath)   Its aim is to raise energy level and increase alertness.  Inhale and exhale rapidly through your nose, keeping your mouth closed but relaxed. Your breaths in and out should be equal in duration, but as short as possible. This is a noisy breathing exercise.  Try for three in-and-out breath cycles per second. This produces a quick movement of the diaphragm, suggesting a dalia. Breathe normally after each cycle.  Do not do for more than 15 seconds on your first try. Each time you practice the Stimulating Breath, you can increase your time by five seconds or so, until you reach a full minute.  If done properly, you may feel invigorated, comparable to the heightened awareness you feel after a good workout. You should feel the effort at the back of the neck, the diaphragm, the chest and the abdomen. Try this breathing exercise the next time you need an energy boost and feel yourself reaching for a cup of coffee.    Exercise 2:  The 4-7-8 (or Relaxing Breath) Exercise  This exercise is utterly simple, takes almost no time, requires no equipment and can be done anywhere. Although you can do the exercise in any position, sit with your back straight while learning the exercise. Place the tip of your tongue against the ridge of tissue just behind your upper front teeth, and keep it there through the entire exercise. You will be exhaling through your mouth around your tongue; try pursing your lips slightly if this seems awkward.  Exhale completely through your mouth, making a whoosh sound.  Close your mouth and inhale quietly through your nose to a mental count of four.  Hold your breath for a count of

## 2024-12-14 DIAGNOSIS — E78.41 ELEVATED LIPOPROTEIN(A): ICD-10-CM

## 2024-12-16 RX ORDER — ROSUVASTATIN CALCIUM 20 MG/1
20 TABLET, COATED ORAL DAILY
Qty: 30 TABLET | Refills: 5 | Status: SHIPPED | OUTPATIENT
Start: 2024-12-16

## 2024-12-16 NOTE — TELEPHONE ENCOUNTER
Refill request for   rosuvastatin (CRESTOR) 20 MG tablet    medication.     Name of Pharmacy- [facility]      Last visit - 11/7/24     Pending visit - NONE    Last refill -5/23/24      Medication Contract signed -   Last Oarrs ran-         Additional Comments

## 2024-12-31 ENCOUNTER — OFFICE VISIT (OUTPATIENT)
Dept: INTERNAL MEDICINE CLINIC | Age: 44
End: 2024-12-31

## 2024-12-31 VITALS
WEIGHT: 311.8 LBS | SYSTOLIC BLOOD PRESSURE: 132 MMHG | HEIGHT: 70 IN | OXYGEN SATURATION: 97 % | TEMPERATURE: 98.2 F | DIASTOLIC BLOOD PRESSURE: 78 MMHG | HEART RATE: 77 BPM | BODY MASS INDEX: 44.64 KG/M2

## 2024-12-31 DIAGNOSIS — L24.89 IRRITANT CONTACT DERMATITIS DUE TO OTHER AGENTS: ICD-10-CM

## 2024-12-31 DIAGNOSIS — L30.4 INTERTRIGO: Primary | ICD-10-CM

## 2024-12-31 RX ORDER — NYSTATIN 100000 [USP'U]/G
POWDER TOPICAL
Qty: 60 G | Refills: 1 | Status: SHIPPED | OUTPATIENT
Start: 2024-12-31

## 2024-12-31 ASSESSMENT — ENCOUNTER SYMPTOMS
COUGH: 0
CHEST TIGHTNESS: 0
SINUS PAIN: 0
CONSTIPATION: 0
NAUSEA: 0
VOMITING: 0
SHORTNESS OF BREATH: 0
DIARRHEA: 0
SORE THROAT: 0

## 2024-12-31 NOTE — PROGRESS NOTES
[facility]YSICIAN PRACTICES  [facility]  [address], [place] [address]  Tel:[phone]    Tyron Epps III is a 44 y.o. male who presents today for his medical conditions/complaints as noted below.  Tyron Epps III is c/o of Other (Inflammation in nixon armpits.  Started using new deodorant approx 1month ago.  Itching started this past Saturday.  )      Chief Complaint   Patient presents with    Other     Inflammation in nixon armpits.  Started using new deodorant approx 1month ago.  Itching started this past Saturday.         HPI:     Rash: Tyron Epps III is a 44 y.o. male who presents with a few day history of a localized rash.  Rash first presented on the bilateral axilla and has not spread.  Rash is red and is pruritic, flat, and macular.  Associated symptoms include none.  Patient has tried nothing.  New exposures: new aluminum free deodorant.  Patient has not had contacts with similar symptoms.  Prior history of similar symptoms: yes with heavy sweating.            Past Medical History:   Diagnosis Date    Anxiety     Closed nondisplaced fracture of neck of fifth metacarpal bone of right hand with routine healing 06/06/2016    GERD (gastroesophageal reflux disease)     GI bleed 10/27/2023    HLA B27 (HLA B27 positive)     Hypertension     Otitis media 10/02/2014    Posterior tibial tendinitis of right leg 09/26/2017    S/P arthroscopy of shoulder 04/04/2017    Tendinitis of left rotator cuff 10/11/2016        Past Surgical History:   Procedure Laterality Date    CERVICAL DISC SURGERY  02/2023    C5    CHOLECYSTECTOMY      EYE SURGERY Left 01/01/2014    TPK    EYE SURGERY Right 03/01/2014    [facility]K    LUMBAR DISC SURGERY  02/2023    SIGMOIDOSCOPY N/A 10/27/2023    SIGMOIDOSCOPY CONTROL HEMORRHAGE performed by Kenji Booker MD at [facility]    SIGMOIDOSCOPY N/A 10/28/2023    SIGMOIDOSCOPY DIAGNOSTIC FLEXIBLE performed by Kenji Booker MD at Health system

## 2025-01-22 DIAGNOSIS — E66.01 CLASS 3 SEVERE OBESITY DUE TO EXCESS CALORIES WITH SERIOUS COMORBIDITY AND BODY MASS INDEX (BMI) OF 40.0 TO 44.9 IN ADULT: ICD-10-CM

## 2025-01-22 DIAGNOSIS — E66.813 CLASS 3 SEVERE OBESITY DUE TO EXCESS CALORIES WITH SERIOUS COMORBIDITY AND BODY MASS INDEX (BMI) OF 40.0 TO 44.9 IN ADULT: ICD-10-CM

## 2025-01-22 DIAGNOSIS — E11.65 TYPE 2 DIABETES MELLITUS WITH HYPERGLYCEMIA, WITHOUT LONG-TERM CURRENT USE OF INSULIN (HCC): ICD-10-CM

## 2025-03-05 ENCOUNTER — OFFICE VISIT (OUTPATIENT)
Dept: INTERNAL MEDICINE CLINIC | Age: 45
End: 2025-03-05

## 2025-03-05 VITALS
HEIGHT: 70 IN | BODY MASS INDEX: 43.81 KG/M2 | OXYGEN SATURATION: 97 % | HEART RATE: 96 BPM | TEMPERATURE: 98.4 F | DIASTOLIC BLOOD PRESSURE: 70 MMHG | WEIGHT: 306 LBS | SYSTOLIC BLOOD PRESSURE: 130 MMHG

## 2025-03-05 DIAGNOSIS — M54.41 ACUTE RIGHT-SIDED LOW BACK PAIN WITH RIGHT-SIDED SCIATICA: Primary | ICD-10-CM

## 2025-03-05 DIAGNOSIS — E11.65 TYPE 2 DIABETES MELLITUS WITH HYPERGLYCEMIA, WITHOUT LONG-TERM CURRENT USE OF INSULIN (HCC): ICD-10-CM

## 2025-03-05 DIAGNOSIS — K21.9 GASTROESOPHAGEAL REFLUX DISEASE WITHOUT ESOPHAGITIS: ICD-10-CM

## 2025-03-05 DIAGNOSIS — M54.40 CHRONIC LOW BACK PAIN WITH SCIATICA, SCIATICA LATERALITY UNSPECIFIED, UNSPECIFIED BACK PAIN LATERALITY: ICD-10-CM

## 2025-03-05 DIAGNOSIS — G89.29 CHRONIC LOW BACK PAIN WITH SCIATICA, SCIATICA LATERALITY UNSPECIFIED, UNSPECIFIED BACK PAIN LATERALITY: ICD-10-CM

## 2025-03-05 RX ORDER — PREGABALIN 75 MG/1
75 CAPSULE ORAL 2 TIMES DAILY
COMMUNITY
End: 2025-03-05 | Stop reason: SDUPTHER

## 2025-03-05 RX ORDER — PANTOPRAZOLE SODIUM 40 MG/1
40 TABLET, DELAYED RELEASE ORAL
Qty: 90 TABLET | Refills: 1 | Status: SHIPPED | OUTPATIENT
Start: 2025-03-05

## 2025-03-05 RX ORDER — METHOCARBAMOL 750 MG/1
750 TABLET, FILM COATED ORAL 4 TIMES DAILY
Qty: 40 TABLET | Refills: 0 | Status: SHIPPED | OUTPATIENT
Start: 2025-03-05 | End: 2025-03-15

## 2025-03-05 RX ORDER — PREGABALIN 75 MG/1
75 CAPSULE ORAL 2 TIMES DAILY
Qty: 60 CAPSULE | Refills: 5 | Status: SHIPPED | OUTPATIENT
Start: 2025-03-05 | End: 2025-09-01

## 2025-03-05 RX ORDER — METHYLPREDNISOLONE 4 MG/1
TABLET ORAL
Qty: 1 KIT | Refills: 0 | Status: SHIPPED | OUTPATIENT
Start: 2025-03-05 | End: 2025-03-11

## 2025-03-05 SDOH — ECONOMIC STABILITY: INCOME INSECURITY: IN THE LAST 12 MONTHS, WAS THERE A TIME WHEN YOU WERE NOT ABLE TO PAY THE MORTGAGE OR RENT ON TIME?: NO

## 2025-03-05 SDOH — ECONOMIC STABILITY: FOOD INSECURITY: WITHIN THE PAST 12 MONTHS, YOU WORRIED THAT YOUR FOOD WOULD RUN OUT BEFORE YOU GOT MONEY TO BUY MORE.: NEVER TRUE

## 2025-03-05 SDOH — ECONOMIC STABILITY: FOOD INSECURITY: WITHIN THE PAST 12 MONTHS, THE FOOD YOU BOUGHT JUST DIDN'T LAST AND YOU DIDN'T HAVE MONEY TO GET MORE.: NEVER TRUE

## 2025-03-05 SDOH — ECONOMIC STABILITY: TRANSPORTATION INSECURITY
IN THE PAST 12 MONTHS, HAS LACK OF TRANSPORTATION KEPT YOU FROM MEETINGS, WORK, OR FROM GETTING THINGS NEEDED FOR DAILY LIVING?: NO

## 2025-03-05 SDOH — ECONOMIC STABILITY: TRANSPORTATION INSECURITY
IN THE PAST 12 MONTHS, HAS THE LACK OF TRANSPORTATION KEPT YOU FROM MEDICAL APPOINTMENTS OR FROM GETTING MEDICATIONS?: NO

## 2025-03-05 ASSESSMENT — ENCOUNTER SYMPTOMS
DIARRHEA: 0
VOMITING: 0
SORE THROAT: 0
SINUS PAIN: 0
NAUSEA: 0
CHEST TIGHTNESS: 0
CONSTIPATION: 0
SHORTNESS OF BREATH: 0
BACK PAIN: 1
COUGH: 0

## 2025-03-05 ASSESSMENT — PATIENT HEALTH QUESTIONNAIRE - PHQ9
SUM OF ALL RESPONSES TO PHQ QUESTIONS 1-9: 0
1. LITTLE INTEREST OR PLEASURE IN DOING THINGS: NOT AT ALL
SUM OF ALL RESPONSES TO PHQ9 QUESTIONS 1 & 2: 0
2. FEELING DOWN, DEPRESSED OR HOPELESS: NOT AT ALL
SUM OF ALL RESPONSES TO PHQ QUESTIONS 1-9: 0
SUM OF ALL RESPONSES TO PHQ QUESTIONS 1-9: 0
2. FEELING DOWN, DEPRESSED OR HOPELESS: NOT AT ALL
1. LITTLE INTEREST OR PLEASURE IN DOING THINGS: NOT AT ALL
SUM OF ALL RESPONSES TO PHQ QUESTIONS 1-9: 0

## 2025-03-05 NOTE — PROGRESS NOTES
Medication Side Effects and Warnings   were discussed with patient    Patient Labs were reviewed and or requested    Patient Past Records were reviewed and or requested    Please note that this dictation was completed with [employer], the computer voice recognition software and [employer] intellience software.  Quite often unanticipated grammatical, syntax, homophones, and other interpretive errors are inadvertently transcribed by the computer software.  Please disregard these errors.  Please excuse any errors that have escaped final proofreading.  Thank you.     Patient should call the office immediately with new or ongoing signs or symptoms or worsening, or proceedto the emergency room.  No changes in past medical history, past surgical history, social history, or family history were noted during the patient encounter unless specifically listed above.  All updates of past medicalhistory, past surgical history, social history, or family history were reviewed personally by me during the office visit.  All problems listed in the assessment are stable unless noted otherwise.  Medication profilereviewed personally by me during the office visit.  Medication side effects and possible impairments from medications were discussed as applicable.     Call if pattern of symptoms change or persists for an extended time.     This document was prepared by a combination of typing and transcription through a voice recognition software.     All medications have the potential for adverse effects. All medications effect each person differently. Please read and review provided information related to medication. If the medication that you have been prescribed has the potential to cause sedation, do not drive or operate car, truck, or heavy machinery until you know how the medication will effect you. If you experience any adverse effects from the medication, please call the office or report to the emergency department.    Saul Ferrari,

## 2025-03-25 DIAGNOSIS — E11.65 TYPE 2 DIABETES MELLITUS WITH HYPERGLYCEMIA, WITHOUT LONG-TERM CURRENT USE OF INSULIN (HCC): ICD-10-CM

## 2025-03-25 DIAGNOSIS — E66.813 CLASS 3 SEVERE OBESITY DUE TO EXCESS CALORIES WITH SERIOUS COMORBIDITY AND BODY MASS INDEX (BMI) OF 40.0 TO 44.9 IN ADULT: ICD-10-CM

## 2025-03-25 DIAGNOSIS — E66.01 CLASS 3 SEVERE OBESITY DUE TO EXCESS CALORIES WITH SERIOUS COMORBIDITY AND BODY MASS INDEX (BMI) OF 40.0 TO 44.9 IN ADULT: ICD-10-CM

## 2025-03-25 NOTE — TELEPHONE ENCOUNTER
Refill request for SEMAGLUTIDE medication.     Name of Pharmacy- [facility]      Last visit - 3/5/25     Pending visit - NONE    Last refill -1/22/25      Medication Contract signed -   Last Oarrs ran-         Additional Comments

## 2025-04-21 ENCOUNTER — CLINICAL SUPPORT (OUTPATIENT)
Dept: INTERNAL MEDICINE CLINIC | Age: 45
End: 2025-04-21
Payer: COMMERCIAL

## 2025-04-21 DIAGNOSIS — Z11.1 VISIT FOR TB SKIN TEST: Primary | ICD-10-CM

## 2025-04-21 PROCEDURE — 86580 TB INTRADERMAL TEST: CPT | Performed by: NURSE PRACTITIONER

## 2025-05-04 DIAGNOSIS — E78.41 ELEVATED LIPOPROTEIN(A): ICD-10-CM

## 2025-05-05 RX ORDER — ROSUVASTATIN CALCIUM 20 MG/1
20 TABLET, COATED ORAL DAILY
Qty: 90 TABLET | Refills: 3 | Status: SHIPPED | OUTPATIENT
Start: 2025-05-05

## 2025-05-05 NOTE — TELEPHONE ENCOUNTER
Refill request for Crestor medication.     Name of Pharmacy- [facility]      Last visit - 3/5/25     Pending visit - None    Last refill -12/16/24

## 2025-05-06 DIAGNOSIS — I10 HYPERTENSION, UNSPECIFIED TYPE: ICD-10-CM

## 2025-05-06 RX ORDER — LOSARTAN POTASSIUM 100 MG/1
100 TABLET ORAL DAILY
Qty: 90 TABLET | Refills: 3 | Status: SHIPPED | OUTPATIENT
Start: 2025-05-06

## 2025-05-06 NOTE — TELEPHONE ENCOUNTER
Refill request for Cozzar medication.     Name of Pharmacy- [facility]      Last visit - 3/5/25     Pending visit - None    Last refill -2/29/24

## 2025-05-17 ENCOUNTER — PATIENT MESSAGE (OUTPATIENT)
Dept: INTERNAL MEDICINE CLINIC | Age: 45
End: 2025-05-17

## 2025-05-19 NOTE — TELEPHONE ENCOUNTER
Patient is asking for an increase in his Ozempic.  He is tolerating the medication.     Pended Ozempic 1mg

## 2025-06-10 RX ORDER — MELOXICAM 15 MG/1
15 TABLET ORAL DAILY
Qty: 90 TABLET | Refills: 1 | Status: SHIPPED | OUTPATIENT
Start: 2025-06-10

## 2025-06-10 NOTE — TELEPHONE ENCOUNTER
Refill request for MELOXICAM medication.     Name of Pharmacy- [facility]      Last visit - 3/5/25     Pending visit - NONE    Last refill -11/7/24      Medication Contract signed -   Last Oarrs ran-         Additional Comments

## 2025-06-18 ENCOUNTER — TELEPHONE (OUTPATIENT)
Dept: INTERNAL MEDICINE CLINIC | Age: 45
End: 2025-06-18

## 2025-06-18 DIAGNOSIS — I10 PRIMARY HYPERTENSION: ICD-10-CM

## 2025-06-18 DIAGNOSIS — Z12.5 SCREENING FOR PROSTATE CANCER: ICD-10-CM

## 2025-06-18 DIAGNOSIS — E11.65 TYPE 2 DIABETES MELLITUS WITH HYPERGLYCEMIA, WITHOUT LONG-TERM CURRENT USE OF INSULIN (HCC): Primary | ICD-10-CM

## 2025-06-18 NOTE — TELEPHONE ENCOUNTER
Please notify pt that they are due for screening labs. These have been ordered for them to complete at their earliest convenience.

## 2025-06-20 DIAGNOSIS — E78.41 ELEVATED LIPOPROTEIN(A): ICD-10-CM

## 2025-06-20 RX ORDER — ROSUVASTATIN CALCIUM 20 MG/1
20 TABLET, COATED ORAL DAILY
Qty: 90 TABLET | Refills: 3 | Status: SHIPPED | OUTPATIENT
Start: 2025-06-20

## 2025-06-20 NOTE — TELEPHONE ENCOUNTER
Additional Comments PHARMACY REQUESTING A 90 DAY SUPPLY THROUGH MAIL ORDER.  LAST FILL WAS SENT TO [facility]     PENDED          Refill request for  medication.     ROSUVASTATIN 20 MG     Name of Pharmacy- [facility]       Last visit - 03/05/2025     Pending visit - NONE    Last refill -5/5/2025

## 2025-06-24 ENCOUNTER — PATIENT MESSAGE (OUTPATIENT)
Dept: INTERNAL MEDICINE CLINIC | Age: 45
End: 2025-06-24

## 2025-06-24 RX ORDER — ONDANSETRON 4 MG/1
4 TABLET, ORALLY DISINTEGRATING ORAL 3 TIMES DAILY PRN
Qty: 21 TABLET | Refills: 0 | Status: SHIPPED | OUTPATIENT
Start: 2025-06-24

## 2025-06-26 DIAGNOSIS — M54.40 CHRONIC LOW BACK PAIN WITH SCIATICA, SCIATICA LATERALITY UNSPECIFIED, UNSPECIFIED BACK PAIN LATERALITY: ICD-10-CM

## 2025-06-26 DIAGNOSIS — G89.29 CHRONIC LOW BACK PAIN WITH SCIATICA, SCIATICA LATERALITY UNSPECIFIED, UNSPECIFIED BACK PAIN LATERALITY: ICD-10-CM

## 2025-06-26 RX ORDER — PREGABALIN 75 MG/1
75 CAPSULE ORAL 2 TIMES DAILY
Qty: 60 CAPSULE | Refills: 2 | Status: SHIPPED | OUTPATIENT
Start: 2025-06-26 | End: 2025-09-24

## 2025-06-26 NOTE — TELEPHONE ENCOUNTER
Refill request for Lyrica medication.     Name of Pharmacy- [facility]      Last visit - 03/05/2025     Pending visit - no follow up on file     Last refill -03/05/2025      PDMP Monitoring:    Last PDMP [place] as Reviewed:  Review User Review Instant Review Result          [unfilled]  Urine Drug Screenings (1 yr)    No resulted procedures found.       Medication Contract and Consent for Opioid Use Documents Filed       Patient Documents       Type of Document Status Date Received Received By Description    Medication Contract Received 3/10/2025  8:29 AM [facility]

## 2025-06-30 ENCOUNTER — HOSPITAL ENCOUNTER (OUTPATIENT)
Dept: LAB | Age: 45
Discharge: HOME | End: 2025-06-30
Payer: COMMERCIAL

## 2025-06-30 DIAGNOSIS — I10 PRIMARY HYPERTENSION: ICD-10-CM

## 2025-06-30 DIAGNOSIS — E11.65 TYPE 2 DIABETES MELLITUS WITH HYPERGLYCEMIA, WITHOUT LONG-TERM CURRENT USE OF INSULIN (HCC): ICD-10-CM

## 2025-06-30 DIAGNOSIS — Z12.5 SCREENING FOR PROSTATE CANCER: ICD-10-CM

## 2025-06-30 LAB
25(OH)D3 SERPL-MCNC: 17.2 NG/ML
ALBUMIN SERPL-MCNC: 4.6 G/DL (ref 3.4–5)
ALBUMIN/GLOB SERPL: 1.6 {RATIO} (ref 1.1–2.2)
ALP SERPL-CCNC: 75 U/L (ref 40–129)
ALT SERPL-CCNC: 48 U/L (ref 10–40)
ANION GAP SERPL CALCULATED.3IONS-SCNC: 13 MMOL/L (ref 3–16)
AST SERPL-CCNC: 29 U/L (ref 15–37)
BASOPHILS # BLD: 0.1 K/UL (ref 0–0.2)
BASOPHILS NFR BLD: 1 %
BILIRUB SERPL-MCNC: 0.3 MG/DL (ref 0–1)
BUN SERPL-MCNC: 19 MG/DL (ref 7–20)
CALCIUM SERPL-MCNC: 9.1 MG/DL (ref 8.3–10.6)
CHLORIDE SERPL-SCNC: 98 MMOL/L (ref 99–110)
CHOLEST SERPL-MCNC: 151 MG/DL (ref 0–199)
CO2 SERPL-SCNC: 27 MMOL/L (ref 21–32)
CREAT SERPL-MCNC: 1.1 MG/DL (ref 0.9–1.3)
CREAT UR-MCNC: 238 MG/DL (ref 39–259)
DEPRECATED RDW RBC AUTO: 15.7 % (ref 12.4–15.4)
EOSINOPHIL # BLD: 0.2 K/UL (ref 0–0.6)
EOSINOPHIL NFR BLD: 2.5 %
EST. AVERAGE GLUCOSE BLD GHB EST-MCNC: 128.4 MG/DL
GFR SERPLBLD CREATININE-BSD FMLA CKD-EPI: 84 ML/MIN/{1.73_M2}
GLUCOSE SERPL-MCNC: 110 MG/DL (ref 70–99)
HBA1C MFR BLD: 6.1 %
HCT VFR BLD AUTO: 44.7 % (ref 40.5–52.5)
HDLC SERPL-MCNC: 34 MG/DL (ref 40–60)
HGB BLD-MCNC: 15.2 G/DL (ref 13.5–17.5)
LDLC SERPL CALC-MCNC: 81 MG/DL
LYMPHOCYTES # BLD: 1.8 K/UL (ref 1–5.1)
LYMPHOCYTES NFR BLD: 24.9 %
MCH RBC QN AUTO: 26.4 PG (ref 26–34)
MCHC RBC AUTO-ENTMCNC: 34 G/DL (ref 31–36)
MCV RBC AUTO: 77.5 FL (ref 80–100)
MICROALBUMIN UR DL<=1MG/L-MCNC: <1.2 MG/DL
MICROALBUMIN/CREAT UR: NORMAL MG/G (ref 0–30)
MONOCYTES # BLD: 0.6 K/UL (ref 0–1.3)
MONOCYTES NFR BLD: 7.7 %
NEUTROPHILS # BLD: 4.7 K/UL (ref 1.7–7.7)
NEUTROPHILS NFR BLD: 63.9 %
PLATELET # BLD AUTO: 185 K/UL (ref 135–450)
PMV BLD AUTO: 9.8 FL (ref 5–10.5)
POTASSIUM SERPL-SCNC: 3.1 MMOL/L (ref 3.5–5.1)
PROT SERPL-MCNC: 7.4 G/DL (ref 6.4–8.2)
PSA SERPL DL<=0.01 NG/ML-MCNC: 0.23 NG/ML (ref 0–4)
RBC # BLD AUTO: 5.77 M/UL (ref 4.2–5.9)
SODIUM SERPL-SCNC: 138 MMOL/L (ref 136–145)
TRIGL SERPL-MCNC: 181 MG/DL (ref 0–150)
TSH SERPL DL<=0.005 MIU/L-ACNC: 0.76 UIU/ML (ref 0.27–4.2)
VLDLC SERPL CALC-MCNC: 36 MG/DL
WBC # BLD AUTO: 7.3 K/UL (ref 4–11)

## 2025-06-30 PROCEDURE — 80061 LIPID PANEL: CPT

## 2025-06-30 PROCEDURE — 82306 VITAMIN D 25 HYDROXY: CPT

## 2025-06-30 PROCEDURE — 80053 COMPREHEN METABOLIC PANEL: CPT

## 2025-06-30 PROCEDURE — 85025 COMPLETE CBC W/AUTO DIFF WBC: CPT

## 2025-06-30 PROCEDURE — 82570 ASSAY OF URINE CREATININE: CPT

## 2025-06-30 PROCEDURE — 84443 ASSAY THYROID STIM HORMONE: CPT

## 2025-06-30 PROCEDURE — 83036 HEMOGLOBIN GLYCOSYLATED A1C: CPT

## 2025-06-30 PROCEDURE — 36415 COLL VENOUS BLD VENIPUNCTURE: CPT

## 2025-06-30 PROCEDURE — 82043 UR ALBUMIN QUANTITATIVE: CPT

## 2025-06-30 PROCEDURE — 84153 ASSAY OF PSA TOTAL: CPT

## 2025-07-01 ENCOUNTER — RESULTS FOLLOW-UP (OUTPATIENT)
Dept: INTERNAL MEDICINE CLINIC | Age: 45
End: 2025-07-01

## 2025-07-07 RX ORDER — ERGOCALCIFEROL 1.25 MG/1
50000 CAPSULE, LIQUID FILLED ORAL WEEKLY
Qty: 12 CAPSULE | Refills: 1 | Status: SHIPPED | OUTPATIENT
Start: 2025-07-07

## 2025-08-05 RX ORDER — CHLORTHALIDONE 25 MG/1
25 TABLET ORAL DAILY
Qty: 90 TABLET | Refills: 1 | Status: SHIPPED | OUTPATIENT
Start: 2025-08-05

## 2025-08-06 DIAGNOSIS — K21.9 GASTROESOPHAGEAL REFLUX DISEASE WITHOUT ESOPHAGITIS: ICD-10-CM

## 2025-08-06 RX ORDER — PANTOPRAZOLE SODIUM 40 MG/1
40 TABLET, DELAYED RELEASE ORAL
Qty: 90 TABLET | Refills: 1 | Status: SHIPPED | OUTPATIENT
Start: 2025-08-06

## 2025-08-13 ENCOUNTER — OFFICE VISIT (OUTPATIENT)
Dept: INTERNAL MEDICINE CLINIC | Age: 45
End: 2025-08-13
Payer: COMMERCIAL

## 2025-08-13 VITALS
SYSTOLIC BLOOD PRESSURE: 115 MMHG | WEIGHT: 278 LBS | HEART RATE: 87 BPM | DIASTOLIC BLOOD PRESSURE: 73 MMHG | BODY MASS INDEX: 39.89 KG/M2 | OXYGEN SATURATION: 97 %

## 2025-08-13 DIAGNOSIS — I10 HYPERTENSION, UNSPECIFIED TYPE: Primary | ICD-10-CM

## 2025-08-13 DIAGNOSIS — M54.2 NECK PAIN: ICD-10-CM

## 2025-08-13 PROCEDURE — 99214 OFFICE O/P EST MOD 30 MIN: CPT | Performed by: NURSE PRACTITIONER

## 2025-08-13 PROCEDURE — 3074F SYST BP LT 130 MM HG: CPT | Performed by: NURSE PRACTITIONER

## 2025-08-13 PROCEDURE — 3078F DIAST BP <80 MM HG: CPT | Performed by: NURSE PRACTITIONER

## 2025-08-13 RX ORDER — POTASSIUM CHLORIDE 750 MG/1
10 CAPSULE, EXTENDED RELEASE ORAL DAILY
Qty: 30 CAPSULE | Refills: 1 | Status: SHIPPED | OUTPATIENT
Start: 2025-08-13

## 2025-08-13 RX ORDER — METHYLPREDNISOLONE 4 MG/1
TABLET ORAL
Qty: 1 KIT | Refills: 0 | Status: SHIPPED | OUTPATIENT
Start: 2025-08-13 | End: 2025-08-19

## 2025-08-13 ASSESSMENT — ENCOUNTER SYMPTOMS
SINUS PAIN: 0
DIARRHEA: 0
CHEST TIGHTNESS: 0
CONSTIPATION: 0
SORE THROAT: 0
NAUSEA: 0
SHORTNESS OF BREATH: 0
VOMITING: 0
COUGH: 0

## 2025-08-15 DIAGNOSIS — I10 HYPERTENSION, UNSPECIFIED TYPE: ICD-10-CM

## 2025-08-15 RX ORDER — LOSARTAN POTASSIUM 100 MG/1
100 TABLET ORAL DAILY
Qty: 90 TABLET | Refills: 3 | Status: SHIPPED | OUTPATIENT
Start: 2025-08-15

## 2025-08-20 RX ORDER — MELOXICAM 15 MG/1
15 TABLET ORAL DAILY
Qty: 90 TABLET | Refills: 1 | Status: SHIPPED | OUTPATIENT
Start: 2025-08-20

## 2025-08-25 DIAGNOSIS — I10 HYPERTENSION, UNSPECIFIED TYPE: ICD-10-CM

## 2025-08-25 RX ORDER — AMLODIPINE BESYLATE 5 MG/1
5-10 TABLET ORAL DAILY
Qty: 180 TABLET | Refills: 3 | Status: SHIPPED | OUTPATIENT
Start: 2025-08-25

## 2025-09-05 ENCOUNTER — OFFICE VISIT (OUTPATIENT)
Dept: INTERNAL MEDICINE CLINIC | Age: 45
End: 2025-09-05
Payer: COMMERCIAL

## 2025-09-05 VITALS
BODY MASS INDEX: 39.34 KG/M2 | SYSTOLIC BLOOD PRESSURE: 137 MMHG | DIASTOLIC BLOOD PRESSURE: 86 MMHG | TEMPERATURE: 97.3 F | OXYGEN SATURATION: 98 % | HEART RATE: 86 BPM | WEIGHT: 274.2 LBS

## 2025-09-05 DIAGNOSIS — R20.0 NUMBNESS OF TOES: Primary | ICD-10-CM

## 2025-09-05 PROCEDURE — 3079F DIAST BP 80-89 MM HG: CPT | Performed by: NURSE PRACTITIONER

## 2025-09-05 PROCEDURE — 99213 OFFICE O/P EST LOW 20 MIN: CPT | Performed by: NURSE PRACTITIONER

## 2025-09-05 PROCEDURE — 3075F SYST BP GE 130 - 139MM HG: CPT | Performed by: NURSE PRACTITIONER

## 2025-09-05 RX ORDER — POTASSIUM CHLORIDE 750 MG/1
10 CAPSULE, EXTENDED RELEASE ORAL DAILY
Qty: 90 CAPSULE | Refills: 1 | Status: SHIPPED | OUTPATIENT
Start: 2025-09-05 | End: 2025-09-05

## 2025-09-05 RX ORDER — POTASSIUM CHLORIDE 750 MG/1
10 CAPSULE, EXTENDED RELEASE ORAL DAILY
Qty: 90 CAPSULE | Refills: 1 | Status: SHIPPED | OUTPATIENT
Start: 2025-09-05

## 2025-09-05 ASSESSMENT — ENCOUNTER SYMPTOMS
DIARRHEA: 0
NAUSEA: 0
SINUS PAIN: 0
SORE THROAT: 0
COUGH: 0
VOMITING: 0
CHEST TIGHTNESS: 0
SHORTNESS OF BREATH: 0
CONSTIPATION: 0

## (undated) DEVICE — ELECTRODE,ECG,STRESS,FOAM,3PK: Brand: MEDLINE

## (undated) DEVICE — ENDOSCOPIC KIT 2 12 FT OP4 DE2 GWN SYR

## (undated) DEVICE — CANNULA NSL AD TBNG L7FT PVC STR NONFLARED PRNG O2 DEL W STD